# Patient Record
Sex: MALE | Race: WHITE | NOT HISPANIC OR LATINO | Employment: FULL TIME | URBAN - METROPOLITAN AREA
[De-identification: names, ages, dates, MRNs, and addresses within clinical notes are randomized per-mention and may not be internally consistent; named-entity substitution may affect disease eponyms.]

---

## 2017-02-17 ENCOUNTER — OFFICE VISIT (OUTPATIENT)
Dept: FAMILY MEDICINE | Facility: CLINIC | Age: 42
End: 2017-02-17
Payer: COMMERCIAL

## 2017-02-17 VITALS
WEIGHT: 177.8 LBS | HEIGHT: 68 IN | BODY MASS INDEX: 26.95 KG/M2 | SYSTOLIC BLOOD PRESSURE: 128 MMHG | DIASTOLIC BLOOD PRESSURE: 83 MMHG | HEART RATE: 94 BPM

## 2017-02-17 DIAGNOSIS — J45.30 MILD PERSISTENT ASTHMA WITHOUT COMPLICATION: ICD-10-CM

## 2017-02-17 DIAGNOSIS — G89.29 CHRONIC BILATERAL LOW BACK PAIN WITHOUT SCIATICA: ICD-10-CM

## 2017-02-17 DIAGNOSIS — J30.2 SEASONAL ALLERGIC RHINITIS, UNSPECIFIED ALLERGIC RHINITIS TRIGGER: ICD-10-CM

## 2017-02-17 DIAGNOSIS — R41.3 MEMORY LOSS: ICD-10-CM

## 2017-02-17 DIAGNOSIS — M54.50 CHRONIC BILATERAL LOW BACK PAIN WITHOUT SCIATICA: ICD-10-CM

## 2017-02-17 DIAGNOSIS — K21.9 GASTROESOPHAGEAL REFLUX DISEASE, ESOPHAGITIS PRESENCE NOT SPECIFIED: Primary | ICD-10-CM

## 2017-02-17 PROCEDURE — 99203 OFFICE O/P NEW LOW 30 MIN: CPT | Performed by: INTERNAL MEDICINE

## 2017-02-17 RX ORDER — LORATADINE 10 MG/1
10 TABLET ORAL DAILY PRN
Qty: 30 TABLET | Refills: 5 | Status: SHIPPED | OUTPATIENT
Start: 2017-02-17 | End: 2020-12-14

## 2017-02-17 RX ORDER — FAMOTIDINE 20 MG/1
20 TABLET, FILM COATED ORAL 2 TIMES DAILY
Qty: 180 TABLET | Refills: 3 | Status: CANCELLED | OUTPATIENT
Start: 2017-02-17

## 2017-02-17 RX ORDER — FLUTICASONE PROPIONATE 110 UG/1
2 AEROSOL, METERED RESPIRATORY (INHALATION) 2 TIMES DAILY
Qty: 3 INHALER | Refills: 3 | Status: SHIPPED | OUTPATIENT
Start: 2017-02-17 | End: 2018-10-30

## 2017-02-17 RX ORDER — ALBUTEROL SULFATE 90 UG/1
2 AEROSOL, METERED RESPIRATORY (INHALATION) EVERY 6 HOURS
Qty: 1 INHALER | Refills: 4 | Status: SHIPPED | OUTPATIENT
Start: 2017-02-17 | End: 2020-12-14

## 2017-02-17 RX ORDER — CYCLOBENZAPRINE HCL 10 MG
10 TABLET ORAL 3 TIMES DAILY PRN
Qty: 40 TABLET | Refills: 3 | Status: SHIPPED | OUTPATIENT
Start: 2017-02-17 | End: 2019-11-06

## 2017-02-17 RX ORDER — OMEPRAZOLE 10 MG/1
CAPSULE, DELAYED RELEASE ORAL
Qty: 180 CAPSULE | Refills: 3 | Status: SHIPPED | OUTPATIENT
Start: 2017-02-17 | End: 2017-07-25

## 2017-02-17 NOTE — PROGRESS NOTES
SUBJECTIVE:                                                    Daniele Carter is a 41 year old male who presents to clinic today for the following health issues:      Medication Followup of omeprazole    Taking Medication as prescribed: yes    Side Effects:  None    Medication Helping Symptoms:  yes     He was started on omeprazole about a year ago by an outside provided because Pepcid was not effective.  Takes 20mg daily and symptoms are controlled mostly.  He does occasionally have intermittent LUQ pain for a few months, none currently.  No fevers, chills, nausea.  Occasionally has red blood in stool, no dark tarry stools.  Drinks a lot of coffee.    Memory concerns-  His wife would like him to ask about his memory.  He related a long history of bad memory that seems to be getting worse.  He notes difficulty with concentration.  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      Problem list and histories reviewed & adjusted, as indicated.  Additional history: as documented    Current Outpatient Prescriptions   Medication Sig Dispense Refill     loratadine (CLARITIN) 10 MG tablet Take 1 tablet (10 mg) by mouth daily as needed for allergies 30 tablet 5     fluticasone (FLOVENT HFA) 110 MCG/ACT Inhaler Inhale 2 puffs into the lungs 2 times daily This is your controller inhaler and use daily. 3 Inhaler 3     cyclobenzaprine (FLEXERIL) 10 MG tablet Take 1 tablet (10 mg) by mouth 3 times daily as needed for muscle spasms 40 tablet 3     albuterol (PROAIR HFA/PROVENTIL HFA/VENTOLIN HFA) 108 (90 BASE) MCG/ACT Inhaler Inhale 2 puffs into the lungs every 6 hours This is your rescue inhaler. 1 Inhaler 4     omeprazole (PRILOSEC) 10 MG CR capsule Take 2 pills by mouth 30-60 minutes before a meal.  Taper down to 1 pill per day as tolerated 180 capsule 3     EPINEPHrine (EPIPEN 2-RONAN) 0.3 MG/0.3ML injection Inject 0.3 mLs (0.3 mg) into the muscle once as needed for anaphylaxis Hold on file until needed 2 each 3      "[DISCONTINUED] fluticasone (FLOVENT HFA) 110 MCG/ACT inhaler Take 2 puffs by mouth 2 times daily This is your controller inhaler and use daily. 3 Inhaler 1     [DISCONTINUED] albuterol (PROVENTIL HFA: VENTOLIN HFA) 108 (90 BASE) MCG/ACT inhaler Inhale 2 puffs into the lungs every 6 hours. This is your rescue inhaler. 1 Inhaler 4     Allergies   Allergen Reactions     Asa [Aspirin]      Unknown reaction- Hives???/     Bee Anaphylaxis       ROS:  Constitutional and GI systems are negative, except as otherwise noted.    OBJECTIVE:                                                    /83  Pulse 94  Ht 5' 8\" (1.727 m)  Wt 177 lb 12.8 oz (80.6 kg)  BMI 27.03 kg/m2  Body mass index is 27.03 kg/(m^2).  GENERAL: healthy, alert and no distress  NEURO: alert and oriented, but concentration is poor.  SLUMS exam was performed in clinic and he scored a 19    Diagnostic Test Results:  none      ASSESSMENT/PLAN:                                                        1. Gastroesophageal reflux disease, esophagitis presence not specified    He is currently taking 20mg daily of omeprazole for the past year.  Sx were not controlled on famotidine.  Discussed long term side effects of PPIs and recommended trying to taper down to 10mg and then off if tolerated.  He is having some LUQ pain intermittently, so recommended EGD and he will consider this.    - omeprazole (PRILOSEC) 10 MG CR capsule; Take 2 pills by mouth 30-60 minutes before a meal.  Taper down to 1 pill per day as tolerated  Dispense: 180 capsule; Refill: 3    2. Memory loss    He is concerned about a family history of Alzheimer disease.  Reassured that this is very unlikely at his age.  We did a SLUMS memory screen in clinic and he did score low at 19, but this seemed to be related more to concentration problems rather than actual memory problems.  Poor concentration is likely due to fatigue since he works night shifts and just finished a shift.  Recommended trying to " improve his sleep and provided handout on sleep hygiene.    3. Mild persistent asthma without complication    Controlled, refills provided.    - fluticasone (FLOVENT HFA) 110 MCG/ACT Inhaler; Inhale 2 puffs into the lungs 2 times daily This is your controller inhaler and use daily.  Dispense: 3 Inhaler; Refill: 3  - albuterol (PROAIR HFA/PROVENTIL HFA/VENTOLIN HFA) 108 (90 BASE) MCG/ACT Inhaler; Inhale 2 puffs into the lungs every 6 hours This is your rescue inhaler.  Dispense: 1 Inhaler; Refill: 4    4. Chronic bilateral low back pain without sciatica     Refill provided    - cyclobenzaprine (FLEXERIL) 10 MG tablet; Take 1 tablet (10 mg) by mouth 3 times daily as needed for muscle spasms  Dispense: 40 tablet; Refill: 3    5. Seasonal allergic rhinitis, unspecified allergic rhinitis trigger    Refill provided    - loratadine (CLARITIN) 10 MG tablet; Take 1 tablet (10 mg) by mouth daily as needed for allergies  Dispense: 30 tablet; Refill: 5      Follow-up as needed.      Elmer Horn MD  [facility]

## 2017-02-17 NOTE — MR AVS SNAPSHOT
"              After Visit Summary   2/17/2017    Daniele Carter    MRN: [id number]           Patient Information     Date Of Birth          1975        Visit Information        Provider Department      2/17/2017 7:40 AM Elmer Horn MD [facility]        Today's Diagnoses     Gastroesophageal reflux disease, esophagitis presence not specified    -  1    Mild persistent asthma without complication        Chronic bilateral low back pain without sciatica        Seasonal allergic rhinitis, unspecified allergic rhinitis trigger          Care Instructions    You memory trouble appears to be due to trouble with concentration, likely due to lack of sleep.        Sleep Hygeine Information (FV, 1 page)    Let us know if you would like to schedule a scope of the stomach to check for ulcers that may be causing your stomach pain.           Follow-ups after your visit        Who to contact     If you have questions or need follow up information about today's clinic visit or your schedule please contact [facility] directly at [phone].  Normal or non-critical lab and imaging results will be communicated to you by MyChart, letter or phone within 4 business days after the clinic has received the results. If you do not hear from us within 7 days, please contact the clinic through [employer] or phone. If you have a critical or abnormal lab result, we will notify you by phone as soon as possible.  Submit refill requests through [employer] or call your pharmacy and they will forward the refill request to us. Please allow 3 business days for your refill to be completed.          Additional Information About Your Visit        MyChart Information     [employer] lets you send messages to your doctor, view your test results, renew your prescriptions, schedule appointments and more. To sign up, go to [email or web address] . Click on \"Log in\" on the left side of the screen, which will take you to the Welcome " "page. Then click on \"Sign up Now\" on the right side of the page.     You will be asked to enter the access code listed below, as well as some personal information. Please follow the directions to create your username and password.     Your access code is: [email or web address]  Expires: 2017  8:15 AM     Your access code will  in 90 days. If you need help or a new code, please call your [place] clinic or [phone].        Care EveryWhere ID     This is your Care EveryWhere ID. This could be used by other organizations to access your [place] medical records  [id number]        Your Vitals Were     Pulse Height BMI (Body Mass Index)             94 5' 8\" (1.727 m) 27.03 kg/m2          Blood Pressure from Last 3 Encounters:   17 128/83   13 143/75   13 119/83    Weight from Last 3 Encounters:   17 177 lb 12.8 oz (80.6 kg)   13 174 lb 12.8 oz (79.3 kg)   13 170 lb (77.1 kg)              Today, you had the following     No orders found for display         Today's Medication Changes          These changes are accurate as of: 17  8:15 AM.  If you have any questions, ask your nurse or doctor.               Start taking these medicines.        Dose/Directions    omeprazole 10 MG CR capsule   Commonly known as:  priLOSEC   Used for:  Gastroesophageal reflux disease, esophagitis presence not specified   Started by:  Elmer Horn MD        Take 2 pills by mouth 30-60 minutes before a meal.  Taper down to 1 pill per day as tolerated   Quantity:  180 capsule   Refills:  3         These medicines have changed or have updated prescriptions.        Dose/Directions    fluticasone 110 MCG/ACT Inhaler   Commonly known as:  FLOVENT HFA   This may have changed:  how to take this   Used for:  Mild persistent asthma without complication   Changed by:  Elmer Horn MD        Dose:  2 puff   Inhale 2 puffs into the lungs 2 times daily This is your controller inhaler and use daily. "   Quantity:  3 Inhaler   Refills:  3       loratadine 10 MG tablet   Commonly known as:  CLARITIN   This may have changed:    - when to take this  - reasons to take this   Used for:  Seasonal allergic rhinitis, unspecified allergic rhinitis trigger   Changed by:  Elmer Horn MD        Dose:  10 mg   Take 1 tablet (10 mg) by mouth daily as needed for allergies   Quantity:  30 tablet   Refills:  5            Where to get your medicines      These medications were sent to [facility] [address] [place] [address] [address] [facility]  [address] [facility] [address]     Phone:  [phone]     albuterol 108 (90 BASE) MCG/ACT Inhaler    cyclobenzaprine 10 MG tablet    fluticasone 110 MCG/ACT Inhaler    loratadine 10 MG tablet    omeprazole 10 MG CR capsule                Primary Care Provider Office Phone # Fax #    Shola Mccormick [phone]       [facility] [address] [facility] [address]        Thank you!     Thank you for choosing [facility]  for your care. Our goal is always to provide you with excellent care. Hearing back from our patients is one way we can continue to improve our services. Please take a few minutes to complete the written survey that you may receive in the mail after your visit with us. Thank you!             Your Updated Medication List - Protect others around you: Learn how to safely use, store and throw away your medicines at www.disposemymeds.org.          This list is accurate as of: 2/17/17  8:15 AM.  Always use your most recent med list.                   Brand Name Dispense Instructions for use    albuterol 108 (90 BASE) MCG/ACT Inhaler    PROAIR HFA/PROVENTIL HFA/VENTOLIN HFA    1 Inhaler    Inhale 2 puffs into the lungs every 6 hours This is your rescue inhaler.       cyclobenzaprine 10 MG tablet    FLEXERIL    40 tablet    Take 1 tablet (10 mg) by mouth 3 times daily as needed for muscle spasms       EPINEPHrine 0.3  MG/0.3ML injection    EPIPEN 2-RONAN    2 each    Inject 0.3 mLs (0.3 mg) into the muscle once as needed for anaphylaxis Hold on file until needed       famotidine 20 MG tablet    PEPCID    180 tablet    Take 1 tablet by mouth 2 times daily.       fluticasone 110 MCG/ACT Inhaler    FLOVENT HFA    3 Inhaler    Inhale 2 puffs into the lungs 2 times daily This is your controller inhaler and use daily.       loratadine 10 MG tablet    CLARITIN    30 tablet    Take 1 tablet (10 mg) by mouth daily as needed for allergies       omeprazole 10 MG CR capsule    priLOSEC    180 capsule    Take 2 pills by mouth 30-60 minutes before a meal.  Taper down to 1 pill per day as tolerated

## 2017-02-17 NOTE — PATIENT INSTRUCTIONS
You memory trouble appears to be due to trouble with concentration, likely due to lack of sleep.        Sleep Hygeine Information (FV, 1 page)    Let us know if you would like to schedule a scope of the stomach to check for ulcers that may be causing your stomach pain.

## 2017-03-16 ENCOUNTER — TELEPHONE (OUTPATIENT)
Dept: FAMILY MEDICINE | Facility: CLINIC | Age: 42
End: 2017-03-16

## 2017-03-16 DIAGNOSIS — R10.12 LUQ ABDOMINAL PAIN: Primary | ICD-10-CM

## 2017-03-16 NOTE — TELEPHONE ENCOUNTER
" Notified him, \"I will call them to set that up . I have to work around my wife's schedule. \"  Gave him the number to call to schedule gastroscopy.     Pamela Merino RNC    "

## 2017-03-16 NOTE — TELEPHONE ENCOUNTER
Reason for Call: Request for an order or referral:    Order or referral being requested: Upper GI     Date needed: as soon as possible    Has the patient been seen by the PCP for this problem? YES    Additional comments: pt states when seen he talked to MD about his left GI pain and was told to wait a month and call if still an issue   Pt is now wanting an upper GI done     Phone number Patient can be reached at:  [phone]    Best Time:  Any     Can we leave a detailed message on this number?  YES    Call taken on 3/16/2017 at 8:30 AM by Shavon Mccormick

## 2017-03-16 NOTE — TELEPHONE ENCOUNTER
"Patient was seen 2/17/17 by Dr Horn in clinic:     \"1. Gastroesophageal reflux disease, esophagitis presence not specified     He is currently taking 20mg daily of omeprazole for the past year. Sx were not controlled on famotidine. Discussed long term side effects of PPIs and recommended trying to taper down to 10mg and then off if tolerated. He is having some LUQ pain intermittently, so recommended EGD and he will consider this.\"    Dr Horn, do you want to order EGD now?   Or see him again?   Pamela Merino RNC    "

## 2017-03-20 ENCOUNTER — TELEPHONE (OUTPATIENT)
Dept: FAMILY MEDICINE | Facility: CLINIC | Age: 42
End: 2017-03-20

## 2017-03-20 NOTE — TELEPHONE ENCOUNTER
Patient is due for ACT.  ACT has not been completed since 2013.  Refills given at 2/17/17 office visit.  Letter with ACT mailed to patient.

## 2017-03-27 ENCOUNTER — ANESTHESIA EVENT (OUTPATIENT)
Dept: GASTROENTEROLOGY | Facility: CLINIC | Age: 42
End: 2017-03-27
Payer: COMMERCIAL

## 2017-03-27 NOTE — ANESTHESIA PREPROCEDURE EVALUATION
Anesthesia Evaluation     . Pt has had prior anesthetic. Type: MAC    No history of anesthetic complications          ROS/MED HX    ENT/Pulmonary:     (+)Mild Persistent asthma Treatment: Inhaler prn,  , . .    Neurologic:  - neg neurologic ROS     Cardiovascular:     (+) Dyslipidemia, ----. : . . . :. .       METS/Exercise Tolerance:  >4 METS   Hematologic:  - neg hematologic  ROS       Musculoskeletal:  - neg musculoskeletal ROS       GI/Hepatic:     (+) GERD Other GI/Hepatic LUQ abd pain      Renal/Genitourinary:  - ROS Renal section negative       Endo:  - neg endo ROS       Psychiatric:  - neg psychiatric ROS       Infectious Disease:  - neg infectious disease ROS       Malignancy:      - no malignancy   Other:    - neg other ROS                 Physical Exam  Normal systems: cardiovascular, pulmonary and dental    Airway   Mallampati: II  TM distance: >3 FB  Neck ROM: full    Dental     Cardiovascular       Pulmonary                     Anesthesia Plan      History & Physical Review  History and physical reviewed and following examination; no interval change.    ASA Status:  2 .    NPO Status:  > 8 hours    Plan for MAC and General with Propofol induction. Reason for MAC:  Deep or markedly invasive procedure (G8)  PONV prophylaxis:  Ondansetron (or other 5HT-3) and Dexamethasone or Solumedrol       Postoperative Care  Postoperative pain management:  IV analgesics and Oral pain medications.      Consents  Anesthetic plan, risks, benefits and alternatives discussed with:  Patient and Spouse..                          .

## 2017-03-27 NOTE — TELEPHONE ENCOUNTER
Talked with pt's wife, she will give him the message.  They did receive act in the mail. Postpone to see if pt mails back.   Erica Lara,CMA

## 2017-03-28 ENCOUNTER — ANESTHESIA (OUTPATIENT)
Dept: GASTROENTEROLOGY | Facility: CLINIC | Age: 42
End: 2017-03-28
Payer: COMMERCIAL

## 2017-03-28 ENCOUNTER — SURGERY (OUTPATIENT)
Age: 42
End: 2017-03-28

## 2017-03-28 ENCOUNTER — HOSPITAL ENCOUNTER (OUTPATIENT)
Facility: CLINIC | Age: 42
Discharge: HOME | End: 2017-03-28
Attending: SURGERY | Admitting: SURGERY
Payer: COMMERCIAL

## 2017-03-28 VITALS
HEART RATE: 79 BPM | DIASTOLIC BLOOD PRESSURE: 82 MMHG | RESPIRATION RATE: 16 BRPM | SYSTOLIC BLOOD PRESSURE: 115 MMHG | OXYGEN SATURATION: 99 % | HEIGHT: 69 IN | BODY MASS INDEX: 26.22 KG/M2 | TEMPERATURE: 97.7 F | WEIGHT: 177 LBS

## 2017-03-28 LAB — UPPER GI ENDOSCOPY: NORMAL

## 2017-03-28 PROCEDURE — 37000008 ZZH ANESTHESIA TECHNICAL FEE, 1ST 30 MIN: Performed by: SURGERY

## 2017-03-28 PROCEDURE — 25000125 ZZHC RX 250: Performed by: SURGERY

## 2017-03-28 PROCEDURE — 88305 TISSUE EXAM BY PATHOLOGIST: CPT | Performed by: SURGERY

## 2017-03-28 PROCEDURE — 88305 TISSUE EXAM BY PATHOLOGIST: CPT | Mod: 26 | Performed by: SURGERY

## 2017-03-28 PROCEDURE — 25000132 ZZH RX MED GY IP 250 OP 250 PS 637: Performed by: SURGERY

## 2017-03-28 PROCEDURE — 43239 EGD BIOPSY SINGLE/MULTIPLE: CPT | Performed by: SURGERY

## 2017-03-28 PROCEDURE — 25000125 ZZHC RX 250: Performed by: NURSE ANESTHETIST, CERTIFIED REGISTERED

## 2017-03-28 PROCEDURE — 25800025 ZZH RX 258: Performed by: SURGERY

## 2017-03-28 RX ORDER — LIDOCAINE HYDROCHLORIDE 10 MG/ML
INJECTION, SOLUTION INFILTRATION; PERINEURAL PRN
Status: DISCONTINUED | OUTPATIENT
Start: 2017-03-28 | End: 2017-03-28

## 2017-03-28 RX ORDER — SODIUM CHLORIDE, SODIUM LACTATE, POTASSIUM CHLORIDE, CALCIUM CHLORIDE 600; 310; 30; 20 MG/100ML; MG/100ML; MG/100ML; MG/100ML
INJECTION, SOLUTION INTRAVENOUS CONTINUOUS
Status: DISCONTINUED | OUTPATIENT
Start: 2017-03-28 | End: 2017-03-28 | Stop reason: HOSPADM

## 2017-03-28 RX ORDER — PROPOFOL 10 MG/ML
INJECTION, EMULSION INTRAVENOUS CONTINUOUS PRN
Status: DISCONTINUED | OUTPATIENT
Start: 2017-03-28 | End: 2017-03-28

## 2017-03-28 RX ORDER — PROPOFOL 10 MG/ML
INJECTION, EMULSION INTRAVENOUS PRN
Status: DISCONTINUED | OUTPATIENT
Start: 2017-03-28 | End: 2017-03-28

## 2017-03-28 RX ORDER — ONDANSETRON 2 MG/ML
4 INJECTION INTRAMUSCULAR; INTRAVENOUS
Status: DISCONTINUED | OUTPATIENT
Start: 2017-03-28 | End: 2017-03-28 | Stop reason: HOSPADM

## 2017-03-28 RX ORDER — LIDOCAINE 40 MG/G
CREAM TOPICAL
Status: DISCONTINUED | OUTPATIENT
Start: 2017-03-28 | End: 2017-03-28 | Stop reason: HOSPADM

## 2017-03-28 RX ADMIN — PROPOFOL 200 MCG/KG/MIN: 10 INJECTION, EMULSION INTRAVENOUS at 11:27

## 2017-03-28 RX ADMIN — LIDOCAINE HYDROCHLORIDE 50 MG: 10 INJECTION, SOLUTION INFILTRATION; PERINEURAL at 11:27

## 2017-03-28 RX ADMIN — PROPOFOL 100 MG: 10 INJECTION, EMULSION INTRAVENOUS at 11:34

## 2017-03-28 RX ADMIN — BENZOCAINE 1 SPRAY: 220 SPRAY, METERED PERIODONTAL at 11:22

## 2017-03-28 RX ADMIN — LIDOCAINE HYDROCHLORIDE 1 ML: 10 INJECTION, SOLUTION INFILTRATION; PERINEURAL at 11:03

## 2017-03-28 RX ADMIN — SODIUM CHLORIDE, POTASSIUM CHLORIDE, SODIUM LACTATE AND CALCIUM CHLORIDE: 600; 310; 30; 20 INJECTION, SOLUTION INTRAVENOUS at 11:03

## 2017-03-28 RX ADMIN — PROPOFOL 50 MG: 10 INJECTION, EMULSION INTRAVENOUS at 11:38

## 2017-03-28 RX ADMIN — PROPOFOL 100 MG: 10 INJECTION, EMULSION INTRAVENOUS at 11:27

## 2017-03-28 NOTE — BRIEF OP NOTE
[facility]   Brief Operative Note    Pre-operative diagnosis: LUQ abd pain   Post-operative diagnosis multiple gastric polyps, otherwise normal    Procedure: Procedure(s):  Gastroscopy   - Wound Class: II-Clean Contaminated   Surgeon(s): Surgeon(s) and Role:     * Nikolai Valladares MD - Primary   Estimated blood loss: * No values recorded between 3/28/2017 12:00 AM and 3/28/2017 11:38 AM *    Specimens:   ID Type Source Tests Collected by Time Destination   A : h. pylori and polyp Polyp Stomach, Antrum SURGICAL PATHOLOGY EXAM Nikolai Valladares MD 3/28/2017 11:32 AM       Findings: 1.  Esophagus normal, z-line regular  2.  Multiple benign gastric polyps (fundus and body), one biopsied  3.  Stomach otherwise normal.  4.  Duodenum normal

## 2017-03-28 NOTE — H&P
41 year old year old male here for upper endoscopy for left upper quadrant pain.        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       Allergies   Allergen Reactions     Asa [Aspirin]      Unknown reaction- Hives???/     Bee Anaphylaxis     Chocolate Flavor Hives       Pt reports that he has never smoked. He has never used smokeless tobacco. He reports that he drinks alcohol. He reports that he does not use illicit drugs.    Exam:    Awake, Alert OX3  Lungs - CTA bilaterally  CV - RRR, no murmurs, distal pulses intact  Abd - soft, non-distended, non-tender, +BS  Extr - No cyanosis or edema    A/P 41 year old year old male in need of upper endoscopy for left upper quadrant pain.  Risks, benefits, alternatives, and complications were discussed including the possibility of perforation and the patient agreed to proceed.    Nikolai Valladares MD

## 2017-03-28 NOTE — ANESTHESIA CARE TRANSFER NOTE
Patient: Daniele Carter    Procedure(s):  Gastroscopy   - Wound Class: II-Clean Contaminated    Diagnosis: LUQ abd pain  Diagnosis Additional Information: No value filed.    Anesthesia Type:   No value filed.     Note:  Airway :Nasal Cannula  Patient transferred to:Phase II        Vitals: (Last set prior to Anesthesia Care Transfer)    CRNA VITALS  3/28/2017 1112 - 3/28/2017 1142      3/28/2017             Pulse: 85    SpO2: 96 %                Electronically Signed By: ISSA Eddy CRNA  March 28, 2017  11:42 AM

## 2017-03-30 LAB — COPATH REPORT: NORMAL

## 2017-04-03 NOTE — TELEPHONE ENCOUNTER
Panel Management Review      Patient has the following on his problem list:     Asthma review     ACT Total Scores 4/3/2017   ACT TOTAL SCORE -   ASTHMA ER VISITS -   ASTHMA HOSPITALIZATIONS -   ACT TOTAL SCORE (Goal Greater than or Equal to 20) 21   In the past 12 months, how many times did you visit the emergency room for your asthma without being admitted to the hospital? 0   In the past 12 months, how many times were you hospitalized overnight because of your asthma? 0      1. Is Asthma diagnosis on the Problem List? Yes    2. Is Asthma listed on Health Maintenance? Yes    3. Patient is due for:  ACT and AAP      Composite cancer screening  Chart review shows that this patient is due/due soon for the following None  Summary:    Patient is due/failing the following:   ACT    Action needed:   Patient needs to do ACT.    Type of outreach:    ACT completed over the phone with a score of 21.  Patient does not have any questions or concerns at this time.    Questions for provider review:    None                                                                                                                                    ARI Sharif, CMA

## 2017-04-04 ASSESSMENT — ASTHMA QUESTIONNAIRES: ACT_TOTALSCORE: 21

## 2017-06-07 ENCOUNTER — TELEPHONE (OUTPATIENT)
Dept: FAMILY MEDICINE | Facility: CLINIC | Age: 42
End: 2017-06-07

## 2017-06-07 NOTE — TELEPHONE ENCOUNTER
"omeprazole (PRILOSEC) 10 MG CR capsule 180 capsule 3 2/17/2017  --   Sig: Take 2 pills by mouth 30-60 minutes before a meal.  Taper down to 1 pill per day as tolerated   Class: E-Prescribe   Order: [id number]   E-Prescribing Status: Receipt confirmed by pharmacy (2/17/2017  8:14 AM CST)     Called and left him a detailed message per his ok to do so below that says \"you have a prescription for 20 mg a day at [facility], and can call them and refer to Dr Horn's order 2/17/17. Left our number to call us back if any further question.   Pamela Merino RNC    "

## 2017-06-07 NOTE — TELEPHONE ENCOUNTER
"Reason for Call:  Other medication increase    Detailed comments: pt calling wondering if he can increase his omeprazole to 20 mg instead of 10 mg. He stated that \"10 mg just doesn't seem to be helping\".  He will need a rx sent in and would appreciate if we can call him and let him know it's been sent.    Phone Number Patient can be reached at: Home number on file [phone] (home)    Best Time: any    Can we leave a detailed message on this number? YES    Call taken on 6/7/2017 at 2:12 PM by Isela Aevry      "

## 2017-07-25 ENCOUNTER — OFFICE VISIT (OUTPATIENT)
Dept: FAMILY MEDICINE | Facility: CLINIC | Age: 42
End: 2017-07-25
Payer: COMMERCIAL

## 2017-07-25 VITALS
WEIGHT: 180 LBS | HEIGHT: 69 IN | SYSTOLIC BLOOD PRESSURE: 120 MMHG | TEMPERATURE: 99.1 F | DIASTOLIC BLOOD PRESSURE: 74 MMHG | BODY MASS INDEX: 26.66 KG/M2 | HEART RATE: 92 BPM

## 2017-07-25 DIAGNOSIS — F51.01 PRIMARY INSOMNIA: ICD-10-CM

## 2017-07-25 DIAGNOSIS — K21.9 GASTROESOPHAGEAL REFLUX DISEASE, ESOPHAGITIS PRESENCE NOT SPECIFIED: Primary | ICD-10-CM

## 2017-07-25 DIAGNOSIS — Z23 NEED FOR DIPHTHERIA-TETANUS-PERTUSSIS (TDAP) VACCINE: ICD-10-CM

## 2017-07-25 DIAGNOSIS — S83.411A SPRAIN OF MEDIAL COLLATERAL LIGAMENT OF RIGHT KNEE, INITIAL ENCOUNTER: ICD-10-CM

## 2017-07-25 PROCEDURE — 90471 IMMUNIZATION ADMIN: CPT | Performed by: INTERNAL MEDICINE

## 2017-07-25 PROCEDURE — 99214 OFFICE O/P EST MOD 30 MIN: CPT | Mod: 25 | Performed by: INTERNAL MEDICINE

## 2017-07-25 PROCEDURE — 90715 TDAP VACCINE 7 YRS/> IM: CPT | Performed by: INTERNAL MEDICINE

## 2017-07-25 NOTE — LETTER
[facility]  [address] [facility] [address]  Phone: [phone]    July 25, 2017        Daniele Carter  [address] [place] [id number]          To whom it may concern:    RE: Daniele Carter    Patient may return to work 7/25/17 with the following restriction: should avoid walking and climbing stairs as much as possible for the next week.      Please contact me for questions or concerns.      Sincerely,        Elmer Horn MD

## 2017-07-25 NOTE — MR AVS SNAPSHOT
After Visit Summary   7/25/2017    Daniele Carter    MRN: [id number]           Patient Information     Date Of Birth          1975        Visit Information        Provider Department      7/25/2017 8:40 AM Elmer Horn MD [facility]        Today's Diagnoses     Need for diphtheria-tetanus-pertussis (Tdap) vaccine    -  1    Gastroesophageal reflux disease, esophagitis presence not specified          Care Instructions    I'd recommend first trying melatonin to help with sleep, either 1mg or 3mg dose.  Try this for about a month or so, and if it's not helping, we can next try a prescription sleep medication.  Unfortunately, no medication is great at helping with sleep long term, so continue to work on sleep hygiene methods.      If knee pain hasn't improved in a few more weeks, please call and we may need to proceed with getting an MRI for further evaluation.  Try to stay off your feet as much as possible for the next week or so.  If work is not able to make accommodations and you need FMLA paperwork filled out for time off, please send us those forms.        Understanding Medial Collateral Ligament Sprain    The knee is a complex joint where the thighbone (femur) meets the shinbone (tibia). Strong tissues called ligaments connect these bones together. Ligaments also keep the bones aligned, so the knee only bends how it is supposed to. The medial collateral ligament (MCL) runs across the knee joint on the medial side of the leg. Injury to this ligament may be very painful. The knee may also not work the way it should.  Causes of an MCL sprain  An MCL sprain often happens when the knee joint is pushed beyond its normal range of motion. It is most common during a blow to the knee from the outside, pushing the knee inward. It may also happen if the knee is forced into a twist. These movements stretch and tear the MCL. Other parts of the knee may be damaged along with the  MCL.  Symptoms of an MCL sprain  These include:    Knee pain    Knee swelling    Locking of the joint    Wobbly or unstable feeling in the joint  Treatment for an MCL sprain  Treatment will depend on the severity of the sprain and whether there is damage to other parts of the knee. Options often include:    Rest. This allows the knee to heal. Activities that stress the knee should be avoided. Crutches, a knee brace, or both may also be recommended for a short time.    Cold packs and elevation of the knee. These help reduce swelling and relieve pain.    Compression. The knee may be wrapped with a bandage to help reduce swelling.    Medicines. These help relieve pain and swelling.    Exercises. These help improve the knee s stability, strength, and range of motion.  If the injury is severe or several parts of the joint are involved, surgery may be an option. Surgery repairs the MCL and any other damaged structures.     When to call your healthcare provider  Call your healthcare provider right away if you have any of these:    Pain, swelling, or instability that doesn t get better with treatment or gets worse    New symptoms   Date Last Reviewed: 3/10/2016    8962-6921 [employer]. [address] [place] [address]. All rights reserved. This information is not intended as a substitute for professional medical care. Always follow your healthcare professional's instructions.        Knee Pain with Possible Torn Meniscus    The meniscus is a tough cartilage pad that cushions the inside of the knee joint. It helps absorb the shock from movement. It also spreads the weight of your body evenly across the knee joint. This prevents excess wear and tear to the bones of that joint.  The most common causes of meniscal tears are injuries, especially related to sports and degenerative disease that happens with aging.  A meniscus tear commonly happens during a twisting injury when the knee is bent. This causes  pain, swelling, reduced movement of the knee, and trouble walking. There may be popping, clicking, joint locking or inability to completely straighten the knee. Ligaments of the knee may also be injured.  A torn meniscus is diagnosed by physical exam and X-rays. In the case of a severe injury, the knee may be too painful to examine fully. A more accurate exam can be done after the initial swelling goes down. An MRI may be ordered to make a final diagnosis.  If your healthcare provider suspects a meniscal injury, you will treat your knee with ice and rest and preventing movement of the knee. A splint or knee brace that keeps your leg straight may be put on to protect the joint. Depending on the severity of the injury, surgery may be needed. A cartilage injury may take 4-12 weeks to heal depending on how bad it is.  Home care    Stay off the injured leg as much as possible until you can walk on it without pain. If you have a lot of pain when walking, crutches or a walker may be prescribed. (These can be rented or bought at many pharmacies and surgical or orthopedic supply stores). Follow your healthcare provider's advice about when to begin putting weight on that leg.    Keep your leg elevated to reduce pain and swelling. When sleeping, place a pillow under the injured leg. When sitting, support the injured leg so it is level with your waist. This is very important during the first 48 hours.    Apply an ice pack over the injured area for 15 to 20 minutes every 3 to 6 hours. You should do this for the first 24 to 48 hours. You can make an ice pack by filling a plastic bag that seals at the top with ice cubes and then wrapping it with a thin towel. Continue to use ice packs for relief of pain and swelling as needed. As the ice melts, be careful to avoid getting your wrap, splint, or cast wet. After 48 hours, apply heat (warm shower or warm bath) for 15 to 20 minutes several times a day, or alternate ice and heat. You  can place the ice pack directly over the splint. If you have to wear a hook-and-loop knee brace, you can open it to apply the ice pack, or heat, directly to the knee. Never put ice directly on the skin. Always wrap the ice in a towel or other type of cloth.    You may use over-the-counter pain medicine to control pain, unless another pain medicine was prescribed. If you have chronic liver or kidney disease or ever had a stomach ulcer, talk with your healthcare provider before using these medicines.    If you were given a splint, keep it dry at all times. Bathe with your splint out of the water. Protect it with a large plastic bag that is rubber-banded at the top end. If a fiberglass splint gets wet, you can dry it with a hair dryer. If you have a hook-and-loop knee brace, you can remove this to bathe, unless told otherwise.    Check with your healthcare provider before returning to sports or full work duties.  Follow-up care  Follow up with your healthcare provider, or as advised. This is usually within 1-2 weeks. Further testing may be required to check the extent of your injury.  If X-rays were taken, you will be told of any new findings that may affect your care.  Call 911  Call 911 if you have:     Shortness of breath    Chest pain  When to seek medical advice  Call your healthcare provider right away if any of these occur:    Toes or foot gets swollen, cold, blue, numb, or tingly    Pain or swelling spreads over the knee or calf    Warmth or redness appears over the knee or calf    Fever of 100.4 F (38 C) or above lasting for 24 to 48 hours  Date Last Reviewed: 11/23/2015 2000-2017 [employer]. [address] [place] [address], [place] [address]. All rights reserved. This information is not intended as a substitute for professional medical care. Always follow your healthcare professional's instructions.                Follow-ups after your visit        Who to contact     If you have questions or need  "follow up information about today's clinic visit or your schedule please contact [facility] directly at [phone].  Normal or non-critical lab and imaging results will be communicated to you by MyChart, letter or phone within 4 business days after the clinic has received the results. If you do not hear from us within 7 days, please contact the clinic through [employer] or phone. If you have a critical or abnormal lab result, we will notify you by phone as soon as possible.  Submit refill requests through [employer] or call your pharmacy and they will forward the refill request to us. Please allow 3 business days for your refill to be completed.          Additional Information About Your Visit        [employer] Information     [employer] lets you send messages to your doctor, view your test results, renew your prescriptions, schedule appointments and more. To sign up, go to [email or web address] . Click on \"Log in\" on the left side of the screen, which will take you to the Welcome page. Then click on \"Sign up Now\" on the right side of the page.     You will be asked to enter the access code listed below, as well as some personal information. Please follow the directions to create your username and password.     Your access code is: [email or web address]  Expires: 10/23/2017  9:17 AM     Your access code will  in 90 days. If you need help or a new code, please call your [facility] clinic or [phone].        Care EveryWhere ID     This is your Care EveryWhere ID. This could be used by other organizations to access your [facility] medical records  [id number]        Your Vitals Were     Pulse Temperature Height BMI (Body Mass Index)          92 99.1  F (37.3  C) (Tympanic) 5' 9\" (1.753 m) 26.58 kg/m2         Blood Pressure from Last 3 Encounters:   17 120/74   17 115/82   17 128/83    Weight from Last 3 Encounters:   17 180 lb (81.6 kg)   17 177 lb (80.3 kg)   17 177 lb 12.8 oz " (80.6 kg)              We Performed the Following     ADMIN 1st VACCINE     SCREENING QUESTIONS FOR ADULT IMMUNIZATIONS     TDAP VACCINE (ADACEL)          Today's Medication Changes          These changes are accurate as of: 7/25/17  9:17 AM.  If you have any questions, ask your nurse or doctor.               These medicines have changed or have updated prescriptions.        Dose/Directions    omeprazole 20 MG CR capsule   Commonly known as:  priLOSEC   This may have changed:    - medication strength  - how much to take  - how to take this  - when to take this  - additional instructions   Used for:  Gastroesophageal reflux disease, esophagitis presence not specified   Changed by:  Elmer Horn MD        Dose:  20 mg   Take 1 capsule (20 mg) by mouth daily   Quantity:  90 capsule   Refills:  3            Where to get your medicines      These medications were sent to [facility] [address] [place] [address] [address] [facility]  [address] [facility] [address]     Phone:  [phone]     omeprazole 20 MG CR capsule                Primary Care Provider Office Phone # Fax #    Shola Mccormick [phone]       [facility] [address] [facility] [address]        Equal Access to Services     MARIE ETE AH: Hadii alton ku hadasho Soomaali, waaxda luqadaha, qaybta kaalmada adeegyada, ryan romo haydaniel price . So [facility] [phone].    ATENCIÓN: Si habla español, tiene a hdz disposición servicios gratuitos de asistencia lingüística. Imelda al [phone].    We comply with applicable federal civil rights laws and [place] laws. We do not discriminate on the basis of race, color, national origin, age, disability sex, sexual orientation or gender identity.            Thank you!     Thank you for choosing [facility]  for your care. Our goal is always to provide you with excellent care. Hearing back from our patients is one way we can continue to  improve our services. Please take a few minutes to complete the written survey that you may receive in the mail after your visit with us. Thank you!             Your Updated Medication List - Protect others around you: Learn how to safely use, store and throw away your medicines at www.disposemymeds.org.          This list is accurate as of: 7/25/17  9:17 AM.  Always use your most recent med list.                   Brand Name Dispense Instructions for use Diagnosis    albuterol 108 (90 BASE) MCG/ACT Inhaler    PROAIR HFA/PROVENTIL HFA/VENTOLIN HFA    1 Inhaler    Inhale 2 puffs into the lungs every 6 hours This is your rescue inhaler.    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    Mild persistent asthma without complication       loratadine 10 MG tablet    CLARITIN    30 tablet    Take 1 tablet (10 mg) by mouth daily as needed for allergies    Seasonal allergic rhinitis, unspecified allergic rhinitis trigger       omeprazole 20 MG CR capsule    priLOSEC    90 capsule    Take 1 capsule (20 mg) by mouth daily    Gastroesophageal reflux disease, esophagitis presence not specified

## 2017-07-25 NOTE — PROGRESS NOTES
SUBJECTIVE:                                                    Daniele Carter is a 41 year old male who presents to clinic today for the following health issues:  Chief Complaint   Patient presents with     Gastrophageal Reflux     dosage change     Sleep Problem     x 6 months, not able to sleep,      Knee Pain     x 1 week, right knee pain, injury doing lawn work      Imm/Inj     Tdap     Medication Followup of Omeprazole     Taking Medication as prescribed: NO-taking more than prescribed     Side Effects:  None    Medication Helping Symptoms:  Yes, when taking the higher dosage.      We attempted to taper down to 10mg of omeprazole, but he had return of reflux on this dose, so he increased back to 20mg with improvement.      Sleep       Duration: x 6 months     Description (location/character/radiation): Patient reports he can fall asleep but cannot stay asleep, works overnights.      Intensity:  mild        Precipitating or alleviating factors: works overnights.     Therapies tried and outcome: None     Tried to improve sleep hygiene with paying attention to diet and exercise, and this helped some, but he is still struggling.  Has not tried any OTCs.      Knee injury       Duration: x 1 week    Description (location/character/radiation): right knee injury while working outside, may have stepped in a hole and twisted his knee    Intensity:  moderate, severe    Accompanying signs and symptoms: feels like something is moving inside     History (similar episodes/previous evaluation): has had some trouble w/ right knee in the past, about 6 years ago.      Precipitating or alleviating factors: knee brace    Therapies tried and outcome: icing seems to help, ibuprofen, flexeril helps       Frank has had ongoing constant knee pain medially in the right knee for a week since twisting his knee with occasional sharp pains.  Feels his knees is popping and catching.  Had a lot of swelling initially, this had improved.   "    Problem list and histories reviewed & adjusted, as indicated.  Additional history: as documented    Current Outpatient Prescriptions   Medication Sig Dispense Refill     omeprazole (PRILOSEC) 20 MG CR capsule Take 1 capsule (20 mg) by mouth daily 90 capsule 3     loratadine (CLARITIN) 10 MG tablet Take 1 tablet (10 mg) by mouth daily as needed for allergies 30 tablet 5     cyclobenzaprine (FLEXERIL) 10 MG tablet Take 1 tablet (10 mg) by mouth 3 times daily as needed for muscle spasms 40 tablet 3     fluticasone (FLOVENT HFA) 110 MCG/ACT Inhaler Inhale 2 puffs into the lungs 2 times daily This is your controller inhaler and use daily. 3 Inhaler 3     albuterol (PROAIR HFA/PROVENTIL HFA/VENTOLIN HFA) 108 (90 BASE) MCG/ACT Inhaler Inhale 2 puffs into the lungs every 6 hours This is your rescue inhaler. 1 Inhaler 4     EPINEPHrine (EPIPEN 2-RONAN) 0.3 MG/0.3ML injection Inject 0.3 mLs (0.3 mg) into the muscle once as needed for anaphylaxis Hold on file until needed 2 each 3     Allergies   Allergen Reactions     Asa [Aspirin]      Unknown reaction- Hives???/     Bee Anaphylaxis     Chocolate Flavor Hives       Reviewed and updated as needed this visit by clinical staffTobacco  Allergies  Med Hx  Surg Hx  Fam Hx  Soc Hx      Reviewed and updated as needed this visit by Provider         ROS:  Constitutional, MSK systems are negative, except as otherwise noted.      OBJECTIVE:   /74 (BP Location: Right arm, Patient Position: Chair, Cuff Size: Adult Regular)  Pulse 92  Temp 99.1  F (37.3  C) (Tympanic)  Ht 5' 9\" (1.753 m)  Wt 180 lb (81.6 kg)  BMI 26.58 kg/m2  Body mass index is 26.58 kg/(m^2).  GENERAL: healthy, alert and no distress  MS: tender to palpation of medial right knee with pain elicited on valgus stress test, no joint laxity or significant edema      ASSESSMENT/PLAN:       1. Gastroesophageal reflux disease, esophagitis presence not specified    Not controlled on 10mg of omeprazole, so will " plan to stick with 20mg.     - omeprazole (PRILOSEC) 20 MG CR capsule; Take 1 capsule (20 mg) by mouth daily  Dispense: 90 capsule; Refill: 3    2. Sprain of medial collateral ligament of right knee, initial encounter    Knee pain likely due to MCL sprain and possible meniscal tear.  Will continue conservative therapy and have him remain off work for the next week since he is not able to do modified activities while at work.  Continue knee brace.  If still not improving after a month or so, further evaluate with MRI.     3. Primary insomnia    Related to shift work.  Some improvement with sleep hygiene methods, but still having problems so will first try some melatonin.  If not improving, consider short term treatment with prescription sleep medication.     4. Need for diphtheria-tetanus-pertussis (Tdap) vaccine    - TDAP VACCINE (ADACEL)  - SCREENING QUESTIONS FOR ADULT IMMUNIZATIONS  - ADMIN 1st VACCINE      Elmer Horn MD  [facility]

## 2017-07-25 NOTE — PATIENT INSTRUCTIONS
I'd recommend first trying melatonin to help with sleep, either 1mg or 3mg dose.  Try this for about a month or so, and if it's not helping, we can next try a prescription sleep medication.  Unfortunately, no medication is great at helping with sleep long term, so continue to work on sleep hygiene methods.      If knee pain hasn't improved in a few more weeks, please call and we may need to proceed with getting an MRI for further evaluation.  Try to stay off your feet as much as possible for the next week or so.  If work is not able to make accommodations and you need [facility] paperwork filled out for time off, please send us those forms.        Understanding Medial Collateral Ligament Sprain    The knee is a complex joint where the thighbone (femur) meets the shinbone (tibia). Strong tissues called ligaments connect these bones together. Ligaments also keep the bones aligned, so the knee only bends how it is supposed to. The medial collateral ligament (MCL) runs across the knee joint on the medial side of the leg. Injury to this ligament may be very painful. The knee may also not work the way it should.  Causes of an MCL sprain  An MCL sprain often happens when the knee joint is pushed beyond its normal range of motion. It is most common during a blow to the knee from the outside, pushing the knee inward. It may also happen if the knee is forced into a twist. These movements stretch and tear the MCL. Other parts of the knee may be damaged along with the MCL.  Symptoms of an MCL sprain  These include:    Knee pain    Knee swelling    Locking of the joint    Wobbly or unstable feeling in the joint  Treatment for an MCL sprain  Treatment will depend on the severity of the sprain and whether there is damage to other parts of the knee. Options often include:    Rest. This allows the knee to heal. Activities that stress the knee should be avoided. Crutches, a knee brace, or both may also be recommended for a short  time.    Cold packs and elevation of the knee. These help reduce swelling and relieve pain.    Compression. The knee may be wrapped with a bandage to help reduce swelling.    Medicines. These help relieve pain and swelling.    Exercises. These help improve the knee s stability, strength, and range of motion.  If the injury is severe or several parts of the joint are involved, surgery may be an option. Surgery repairs the MCL and any other damaged structures.     When to call your healthcare provider  Call your healthcare provider right away if you have any of these:    Pain, swelling, or instability that doesn t get better with treatment or gets worse    New symptoms   Date Last Reviewed: 3/10/2016    6164-4112 [employer]. [address] [place] [address], [place] [address]. All rights reserved. This information is not intended as a substitute for professional medical care. Always follow your healthcare professional's instructions.        Knee Pain with Possible Torn Meniscus    The meniscus is a tough cartilage pad that cushions the inside of the knee joint. It helps absorb the shock from movement. It also spreads the weight of your body evenly across the knee joint. This prevents excess wear and tear to the bones of that joint.  The most common causes of meniscal tears are injuries, especially related to sports and degenerative disease that happens with aging.  A meniscus tear commonly happens during a twisting injury when the knee is bent. This causes pain, swelling, reduced movement of the knee, and trouble walking. There may be popping, clicking, joint locking or inability to completely straighten the knee. Ligaments of the knee may also be injured.  A torn meniscus is diagnosed by physical exam and X-rays. In the case of a severe injury, the knee may be too painful to examine fully. A more accurate exam can be done after the initial swelling goes down. An MRI may be ordered to make a final diagnosis.  If  your healthcare provider suspects a meniscal injury, you will treat your knee with ice and rest and preventing movement of the knee. A splint or knee brace that keeps your leg straight may be put on to protect the joint. Depending on the severity of the injury, surgery may be needed. A cartilage injury may take 4-12 weeks to heal depending on how bad it is.  Home care    Stay off the injured leg as much as possible until you can walk on it without pain. If you have a lot of pain when walking, crutches or a walker may be prescribed. (These can be rented or bought at many pharmacies and surgical or orthopedic supply stores). Follow your healthcare provider's advice about when to begin putting weight on that leg.    Keep your leg elevated to reduce pain and swelling. When sleeping, place a pillow under the injured leg. When sitting, support the injured leg so it is level with your waist. This is very important during the first 48 hours.    Apply an ice pack over the injured area for 15 to 20 minutes every 3 to 6 hours. You should do this for the first 24 to 48 hours. You can make an ice pack by filling a plastic bag that seals at the top with ice cubes and then wrapping it with a thin towel. Continue to use ice packs for relief of pain and swelling as needed. As the ice melts, be careful to avoid getting your wrap, splint, or cast wet. After 48 hours, apply heat (warm shower or warm bath) for 15 to 20 minutes several times a day, or alternate ice and heat. You can place the ice pack directly over the splint. If you have to wear a hook-and-loop knee brace, you can open it to apply the ice pack, or heat, directly to the knee. Never put ice directly on the skin. Always wrap the ice in a towel or other type of cloth.    You may use over-the-counter pain medicine to control pain, unless another pain medicine was prescribed. If you have chronic liver or kidney disease or ever had a stomach ulcer, talk with your healthcare  provider before using these medicines.    If you were given a splint, keep it dry at all times. Bathe with your splint out of the water. Protect it with a large plastic bag that is rubber-banded at the top end. If a fiberglass splint gets wet, you can dry it with a hair dryer. If you have a hook-and-loop knee brace, you can remove this to bathe, unless told otherwise.    Check with your healthcare provider before returning to sports or full work duties.  Follow-up care  Follow up with your healthcare provider, or as advised. This is usually within 1-2 weeks. Further testing may be required to check the extent of your injury.  If X-rays were taken, you will be told of any new findings that may affect your care.  Call 911  Call 911 if you have:     Shortness of breath    Chest pain  When to seek medical advice  Call your healthcare provider right away if any of these occur:    Toes or foot gets swollen, cold, blue, numb, or tingly    Pain or swelling spreads over the knee or calf    Warmth or redness appears over the knee or calf    Fever of 100.4 F (38 C) or above lasting for 24 to 48 hours  Date Last Reviewed: 11/23/2015 2000-2017 [employer]. [address] [place] [address], [place] [address]. All rights reserved. This information is not intended as a substitute for professional medical care. Always follow your healthcare professional's instructions.

## 2017-09-20 DIAGNOSIS — K21.9 GASTROESOPHAGEAL REFLUX DISEASE, ESOPHAGITIS PRESENCE NOT SPECIFIED: ICD-10-CM

## 2017-09-20 NOTE — TELEPHONE ENCOUNTER
Reason for Call:  Medication or medication refill:    Do you use a [facility]?  Name of the pharmacy and phone number for the current request:  [facility]    Name of the medication requested: Omeprazole    Omeprazole     Last Written Prescription Date: 7/25/2017  Last Fill Quantity: 90,  # refills: 3   Last Office Visit with [facility], P or [facility] prescribing provider: 7/25/2017                                             Pt said that he was taking his Omeprazole 20 mg BID.  The Rx said Daily.  He said that one tablet did not work for him but 2 tablets per day works great.  He is wondering if he can get a new script and have the orders changed.  Please advise.    Can we leave a detailed message on this number? YES    Phone number patient can be reached at: Cell number on file:    Telephone Information:   Mobile [phone]       Best Time: any    Call taken on 9/20/2017 at 4:57 PM by Lizbet Ignacio

## 2017-09-22 RX ORDER — OMEPRAZOLE 40 MG/1
40 CAPSULE, DELAYED RELEASE ORAL DAILY
Qty: 90 CAPSULE | Refills: 2 | Status: SHIPPED | OUTPATIENT
Start: 2017-09-22 | End: 2018-06-18

## 2017-09-22 NOTE — TELEPHONE ENCOUNTER
Dr. Horn,    Patient has gone up on the omeprazole not down.  Asking us to increase the prescription to 40 mg daily now.  Please advise. Flori CLEARY RN    LOV- 7/2017  1. Gastroesophageal reflux disease, esophagitis presence not specified     Not controlled on 10mg of omeprazole, so will plan to stick with 20mg.      - omeprazole (PRILOSEC) 20 MG CR capsule; Take 1 capsule (20 mg) by mouth daily  Dispense: 90 capsule; Refill: 3

## 2017-12-20 ENCOUNTER — OFFICE VISIT (OUTPATIENT)
Dept: FAMILY MEDICINE | Facility: CLINIC | Age: 42
End: 2017-12-20
Payer: COMMERCIAL

## 2017-12-20 VITALS
WEIGHT: 180 LBS | OXYGEN SATURATION: 97 % | TEMPERATURE: 97.9 F | HEIGHT: 69 IN | DIASTOLIC BLOOD PRESSURE: 85 MMHG | HEART RATE: 82 BPM | SYSTOLIC BLOOD PRESSURE: 130 MMHG | BODY MASS INDEX: 26.66 KG/M2

## 2017-12-20 DIAGNOSIS — H91.91 HEARING LOSS OF RIGHT EAR, UNSPECIFIED HEARING LOSS TYPE: ICD-10-CM

## 2017-12-20 DIAGNOSIS — R42 DIZZINESS: Primary | ICD-10-CM

## 2017-12-20 DIAGNOSIS — Z13.220 SCREENING CHOLESTEROL LEVEL: ICD-10-CM

## 2017-12-20 LAB
ALBUMIN SERPL-MCNC: 3.8 G/DL (ref 3.4–5)
ALP SERPL-CCNC: 86 U/L (ref 40–150)
ALT SERPL W P-5'-P-CCNC: 43 U/L (ref 0–70)
ANION GAP SERPL CALCULATED.3IONS-SCNC: 4 MMOL/L (ref 3–14)
AST SERPL W P-5'-P-CCNC: 24 U/L (ref 0–45)
BILIRUB SERPL-MCNC: 0.8 MG/DL (ref 0.2–1.3)
BUN SERPL-MCNC: 11 MG/DL (ref 7–30)
CALCIUM SERPL-MCNC: 8.9 MG/DL (ref 8.5–10.1)
CHLORIDE SERPL-SCNC: 104 MMOL/L (ref 94–109)
CHOLEST SERPL-MCNC: 233 MG/DL
CO2 SERPL-SCNC: 29 MMOL/L (ref 20–32)
CREAT SERPL-MCNC: 0.68 MG/DL (ref 0.66–1.25)
ERYTHROCYTE [DISTWIDTH] IN BLOOD BY AUTOMATED COUNT: 13.8 % (ref 10–15)
GFR SERPL CREATININE-BSD FRML MDRD: >90 ML/MIN/1.7M2
GLUCOSE SERPL-MCNC: 95 MG/DL (ref 70–99)
HCT VFR BLD AUTO: 47.3 % (ref 40–53)
HDLC SERPL-MCNC: 60 MG/DL
HGB BLD-MCNC: 15.3 G/DL (ref 13.3–17.7)
LDLC SERPL CALC-MCNC: 136 MG/DL
MCH RBC QN AUTO: 27.8 PG (ref 26.5–33)
MCHC RBC AUTO-ENTMCNC: 32.3 G/DL (ref 31.5–36.5)
MCV RBC AUTO: 86 FL (ref 78–100)
NONHDLC SERPL-MCNC: 173 MG/DL
PLATELET # BLD AUTO: 320 10E9/L (ref 150–450)
POTASSIUM SERPL-SCNC: 4 MMOL/L (ref 3.4–5.3)
PROT SERPL-MCNC: 8 G/DL (ref 6.8–8.8)
RBC # BLD AUTO: 5.51 10E12/L (ref 4.4–5.9)
SODIUM SERPL-SCNC: 137 MMOL/L (ref 133–144)
TRIGL SERPL-MCNC: 184 MG/DL
TSH SERPL DL<=0.005 MIU/L-ACNC: 0.44 MU/L (ref 0.4–4)
WBC # BLD AUTO: 6 10E9/L (ref 4–11)

## 2017-12-20 PROCEDURE — 36415 COLL VENOUS BLD VENIPUNCTURE: CPT | Performed by: INTERNAL MEDICINE

## 2017-12-20 PROCEDURE — 85027 COMPLETE CBC AUTOMATED: CPT | Performed by: INTERNAL MEDICINE

## 2017-12-20 PROCEDURE — 99214 OFFICE O/P EST MOD 30 MIN: CPT | Performed by: INTERNAL MEDICINE

## 2017-12-20 PROCEDURE — 80061 LIPID PANEL: CPT | Performed by: INTERNAL MEDICINE

## 2017-12-20 PROCEDURE — 84443 ASSAY THYROID STIM HORMONE: CPT | Performed by: INTERNAL MEDICINE

## 2017-12-20 PROCEDURE — 80053 COMPREHEN METABOLIC PANEL: CPT | Performed by: INTERNAL MEDICINE

## 2017-12-20 RX ORDER — PREDNISONE 20 MG/1
TABLET ORAL
Qty: 20 TABLET | Refills: 0 | Status: SHIPPED | OUTPATIENT
Start: 2017-12-20 | End: 2018-10-30

## 2017-12-20 NOTE — LETTER
"December 20, 2017      Daniele Carter  [address] [address]        Dear ,    We are writing to inform you of your test results.    Please let Frank know that his elecrolytes, kidney function, liver tests, diabetes test (glucose), blood cell counts, and thyroid function are all normal.  His total cholesterol level is high, LDL \"bad\" cholesterol level is borderline high, HDL \"good\" cholesterol level is good, and triglycerides are borderline high.  These levels are not bad enough to require medication, but he should continue to focus on making healthy diet choices and increasing his exercise levels to improve cholesterol.   Thanks.  Resulted Orders   TSH with free T4 reflex   Result Value Ref Range    TSH 0.44 0.40 - 4.00 mU/L   Lipid panel reflex to direct LDL Fasting   Result Value Ref Range    Cholesterol 233 (H) <200 mg/dL      Comment:      Desirable:       <200 mg/dl    Triglycerides 184 (H) <150 mg/dL      Comment:      Borderline high:  150-199 mg/dl  High:             200-499 mg/dl  Very high:       >499 mg/dl      HDL Cholesterol 60 >39 mg/dL    LDL Cholesterol Calculated 136 (H) <100 mg/dL      Comment:      Above desirable:  100-129 mg/dl  Borderline High:  130-159 mg/dL  High:             160-189 mg/dL  Very high:       >189 mg/dl      Non HDL Cholesterol 173 (H) <130 mg/dL      Comment:      Above Desirable:  130-159 mg/dl  Borderline high:  160-189 mg/dl  High:             190-219 mg/dl  Very high:       >219 mg/dl     CBC with platelets   Result Value Ref Range    WBC 6.0 4.0 - 11.0 10e9/L    RBC Count 5.51 4.4 - 5.9 10e12/L    Hemoglobin 15.3 13.3 - 17.7 g/dL    Hematocrit 47.3 40.0 - 53.0 %    MCV 86 78 - 100 fl    MCH 27.8 26.5 - 33.0 pg    MCHC 32.3 31.5 - 36.5 g/dL    RDW 13.8 10.0 - 15.0 %    Platelet Count 320 150 - 450 10e9/L   Comprehensive metabolic panel   Result Value Ref Range    Sodium 137 133 - 144 mmol/L    Potassium 4.0 3.4 - 5.3 mmol/L    Chloride 104 94 - " 109 mmol/L    Carbon Dioxide 29 20 - 32 mmol/L    Anion Gap 4 3 - 14 mmol/L    Glucose 95 70 - 99 mg/dL    Urea Nitrogen 11 7 - 30 mg/dL    Creatinine 0.68 0.66 - 1.25 mg/dL    GFR Estimate >90 >60 mL/min/1.7m2      Comment:      Non  GFR Calc    GFR Estimate If Black >90 >60 mL/min/1.7m2      Comment:       GFR Calc    Calcium 8.9 8.5 - 10.1 mg/dL    Bilirubin Total 0.8 0.2 - 1.3 mg/dL    Albumin 3.8 3.4 - 5.0 g/dL    Protein Total 8.0 6.8 - 8.8 g/dL    Alkaline Phosphatase 86 40 - 150 U/L    ALT 43 0 - 70 U/L    AST 24 0 - 45 U/L       If you have any questions or concerns, please call the clinic at the number listed above.       Sincerely,        Elmer Horn MD/cb

## 2017-12-20 NOTE — PATIENT INSTRUCTIONS
We'll get the blood tests today.  Schedule the hearing test and brain MRI.    Inner Ear Problems: Causes of Dizziness (Vertigo)       Benign positional vertigo (BPV)  This is the most common cause of vertigo. BPV is also called benign positional paroxysmal vertigo (BPPV). It happens when crystals in the ear canals shift into the wrong place. Vertigo usually occurs when you move your head in a certain way. This can happen when turning in bed, bending, or looking up. Because BPV comes on quickly, you should think about if you are safe to drive or do other tasks that need your full attention.  BPV:    Causes vertigo that last for seconds. Vertigo can occur several times a day, depending on body position.    Doesn t cause hearing loss    Often goes away on its own. But it but may go away sooner with treatment.  Infection or inflammation  Sometimes the semicircular canals swell and send incorrect balance signals. This problem may be caused by a viral infection. Depending on the cause, your hearing can be affected (labyrinthitis). Or your hearing can remain normal (neuronitis).  Infection or inflammation:    Causes vertigo that lasts for hours or days. The first episode is usually the worst.    Can cause hearing loss    Often goes away on its own. But it may go away sooner with treatment.  You may need vestibular rehabilitation if you have balance problems that don't go away.  Meniere s disease  This condition is uncommon. It happens when there is too much fluid in the ear canals. This causes increased pressure and swelling. It affects balance and hearing signals.  Meniere s disease may:    Cause vertigo that last for hours    Cause hearing problems that come and go. The problems are usually in one ear and get worse over time.    Cause buzzing or ringing in the ears (tinnitus)    Cause a feeling of fullness or pressure in the ear    Cause any of these symptoms: vertigo, hearing loss, tinnitus, or ear fullness to last a  lifetime  Date Last Reviewed: 11/1/2016 2000-2017 [employer]. [address] [place] [address], [place] [address]. All rights reserved. This information is not intended as a substitute for professional medical care. Always follow your healthcare professional's instructions.

## 2017-12-20 NOTE — NURSING NOTE
"Chief Complaint   Patient presents with     Fatigue     x 2 months, dizziness     Imm/Inj     flu vaccine deferred        Initial /85 (BP Location: Left arm, Patient Position: Chair, Cuff Size: Adult Regular)  Pulse 82  Temp 97.9  F (36.6  C) (Tympanic)  Ht 5' 9\" (1.753 m)  Wt 180 lb (81.6 kg)  SpO2 97%  BMI 26.58 kg/m2 Estimated body mass index is 26.58 kg/(m^2) as calculated from the following:    Height as of this encounter: 5' 9\" (1.753 m).    Weight as of this encounter: 180 lb (81.6 kg).  Medication Reconciliation: complete   Lizbet JAMES CMA (AAMA)    "

## 2017-12-20 NOTE — MR AVS SNAPSHOT
After Visit Summary   12/20/2017    Daniele Carter    MRN: [id number]           Patient Information     Date Of Birth          1975        Visit Information        Provider Department      12/20/2017 7:20 AM Elmer Horn MD [facility]        Today's Diagnoses     Dizziness    -  1    Screening cholesterol level        Hearing loss of right ear, unspecified hearing loss type          Care Instructions    We'll get the blood tests today.  Schedule the hearing test and brain MRI.    Inner Ear Problems: Causes of Dizziness (Vertigo)       Benign positional vertigo (BPV)  This is the most common cause of vertigo. BPV is also called benign positional paroxysmal vertigo (BPPV). It happens when crystals in the ear canals shift into the wrong place. Vertigo usually occurs when you move your head in a certain way. This can happen when turning in bed, bending, or looking up. Because BPV comes on quickly, you should think about if you are safe to drive or do other tasks that need your full attention.  BPV:    Causes vertigo that last for seconds. Vertigo can occur several times a day, depending on body position.    Doesn t cause hearing loss    Often goes away on its own. But it but may go away sooner with treatment.  Infection or inflammation  Sometimes the semicircular canals swell and send incorrect balance signals. This problem may be caused by a viral infection. Depending on the cause, your hearing can be affected (labyrinthitis). Or your hearing can remain normal (neuronitis).  Infection or inflammation:    Causes vertigo that lasts for hours or days. The first episode is usually the worst.    Can cause hearing loss    Often goes away on its own. But it may go away sooner with treatment.  You may need vestibular rehabilitation if you have balance problems that don't go away.  Meniere s disease  This condition is uncommon. It happens when there is too much fluid in the ear canals. This  causes increased pressure and swelling. It affects balance and hearing signals.  Meniere s disease may:    Cause vertigo that last for hours    Cause hearing problems that come and go. The problems are usually in one ear and get worse over time.    Cause buzzing or ringing in the ears (tinnitus)    Cause a feeling of fullness or pressure in the ear    Cause any of these symptoms: vertigo, hearing loss, tinnitus, or ear fullness to last a lifetime  Date Last Reviewed: 11/1/2016 2000-2017 [employer]. [address] [place] [address]. All rights reserved. This information is not intended as a substitute for professional medical care. Always follow your healthcare professional's instructions.                Follow-ups after your visit        Additional Services     AUDIOLOGY ADULT REFERRAL       Your provider has referred you to: [facility] [phone]   [email or web address]    Specialty Testing:  Audiogram w/Tymps and Reflexes (Comprehensive Audiology Evaluation)                  Future tests that were ordered for you today     Open Future Orders        Priority Expected Expires Ordered    MR Brain w/o & w Contrast Routine  12/20/2018 12/20/2017            Who to contact     If you have questions or need follow up information about today's clinic visit or your schedule please contact [facility] directly at [phone].  Normal or non-critical lab and imaging results will be communicated to you by MyChart, letter or phone within 4 business days after the clinic has received the results. If you do not hear from us within 7 days, please contact the clinic through MyChart or phone. If you have a critical or abnormal lab result, we will notify you by phone as soon as possible.  Submit refill requests through [email or web address] or call your pharmacy and they will forward the refill request to us. Please allow 3 business days for your  "refill to be completed.          Additional Information About Your Visit        [employer] Information     [employer] lets you send messages to your doctor, view your test results, renew your prescriptions, schedule appointments and more. To sign up, go to [email or web address] . Click on \"Log in\" on the left side of the screen, which will take you to the Welcome page. Then click on \"Sign up Now\" on the right side of the page.     You will be asked to enter the access code listed below, as well as some personal information. Please follow the directions to create your username and password.     Your access code is: [email or web address]  Expires: 3/20/2018  8:08 AM     Your access code will  in 90 days. If you need help or a new code, please call your [place] clinic or [phone].        Care EveryWhere ID     This is your [facility] EveryWhere ID. This could be used by other organizations to access your [place] medical records  [id number]        Your Vitals Were     Pulse Temperature Height Pulse Oximetry BMI (Body Mass Index)       82 97.9  F (36.6  C) (Tympanic) 5' 9\" (1.753 m) 97% 26.58 kg/m2        Blood Pressure from Last 3 Encounters:   17 130/85   17 120/74   17 115/82    Weight from Last 3 Encounters:   17 180 lb (81.6 kg)   17 180 lb (81.6 kg)   17 177 lb (80.3 kg)              We Performed the Following     AUDIOLOGY ADULT REFERRAL     CBC with platelets     Comprehensive metabolic panel     Lipid panel reflex to direct LDL Fasting     TSH with free T4 reflex          Today's Medication Changes          These changes are accurate as of: 17  8:08 AM.  If you have any questions, ask your nurse or doctor.               Start taking these medicines.        Dose/Directions    predniSONE 20 MG tablet   Commonly known as:  DELTASONE   Used for:  Dizziness   Started by:  Elmer Horn MD        Take 3 tabs (60 mg) by mouth daily x 5 days, 2 tabs (40 mg) daily x 1 day, 1.5 tab " (30 mg) daily x 1 day, 1 tab (20mg) daily x 1 day, then 1/2 tab (10 mg) x 1 day.   Quantity:  20 tablet   Refills:  0            Where to get your medicines      Some of these will need a paper prescription and others can be bought over the counter.  Ask your nurse if you have questions.     Bring a paper prescription for each of these medications     predniSONE 20 MG tablet                Primary Care Provider Office Phone # Fax #    Shola Mccormick [phone] [address] [facility] [address]        Equal Access to Services     [facility] : Hadii alton ku hadasho Soomaali, waaxda luqadaha, qaybta kaalmada adeegyada, waxkimberly price . So [facility] [phone].    ATENCIÓN: Si habla español, tiene a hdz disposición servicios gratuitos de asistencia lingüística. Llame al [phone].    We comply with applicable federal civil rights laws and [place] laws. We do not discriminate on the basis of race, color, national origin, age, disability, sex, sexual orientation, or gender identity.            Thank you!     Thank you for choosing [facility]  for your care. Our goal is always to provide you with excellent care. Hearing back from our patients is one way we can continue to improve our services. Please take a few minutes to complete the written survey that you may receive in the mail after your visit with us. Thank you!             Your Updated Medication List - Protect others around you: Learn how to safely use, store and throw away your medicines at www.disposemymeds.org.          This list is accurate as of: 12/20/17  8:08 AM.  Always use your most recent med list.                   Brand Name Dispense Instructions for use Diagnosis    albuterol 108 (90 BASE) MCG/ACT Inhaler    PROAIR HFA/PROVENTIL HFA/VENTOLIN HFA    1 Inhaler    Inhale 2 puffs into the lungs every 6 hours This is your rescue inhaler.    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    Mild persistent asthma without complication       loratadine 10 MG tablet    CLARITIN    30 tablet    Take 1 tablet (10 mg) by mouth daily as needed for allergies    Seasonal allergic rhinitis, unspecified allergic rhinitis trigger       omeprazole 40 MG capsule    priLOSEC    90 capsule    Take 1 capsule (40 mg) by mouth daily    Gastroesophageal reflux disease, esophagitis presence not specified       predniSONE 20 MG tablet    DELTASONE    20 tablet    Take 3 tabs (60 mg) by mouth daily x 5 days, 2 tabs (40 mg) daily x 1 day, 1.5 tab (30 mg) daily x 1 day, 1 tab (20mg) daily x 1 day, then 1/2 tab (10 mg) x 1 day.    Dizziness

## 2017-12-20 NOTE — PROGRESS NOTES
SUBJECTIVE:   Daniele Carter is a 42 year old male who presents to clinic today for the following health issues:  Chief Complaint   Patient presents with     Fatigue     x 2 months, dizziness     Imm/Inj     flu vaccine deferred      Dizziness      Duration: x 2 months, occurring 3 - 4 times monthly lasting from 10 minutes to 1 hour.      Description   Feeling faint:  YES- a couple of times patient reports falling.    Feeling like the surroundings are moving: yes, a little bit   Loss of consciousness or falls: YES- no loss of consciousness, just fell     Intensity:  mild    Accompanying signs and symptoms:   Nausea/vomitting: no   Palpitations: yes maybe a little bit, wondering if acid reflux   Weakness in arms or legs: YES- a little tingling in arms and sinus pressure in head  Vision or speech changes: no   Ringing in ears (Tinnitus): YES- sometimes   Hearing loss related to dizziness: YES  Other (fevers/chills/sweating/dyspnea): YES- gets cold and sweating profusely and feels some burning on occasion.  This occurred last night.  He does have some allergies to their new dog- gets itchy, watery eyes and rhinorrhea and sinus congestion    History (similar episodes/head trauma/previous evaluation/recent bleeding): None    Precipitating or alleviating factors (new meds/chemicals): None  Worse with activity/head movement: YES- if patient gets up and walks around    Therapies tried and outcome: will eat something and that helps, lays down to relieve dizziness.       Frank presents with 2 months of dizzy episodes.  Last month these only happened about 3-4 times, but this month it has gotten more frequent- about 1-2 times per week.  These last 10 min to and hour and he feels like things are spinning.  Fell over a couple of times symptoms were so bad- no LOC.  He's had some ongoing hearing loss for the past year or so.     Problem list and histories reviewed & adjusted, as indicated.  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     CARDIOVASCULAR SCREENING; LDL GOAL LESS THAN 160     Mild persistent asthma     Past Surgical History:   Procedure Laterality Date     COLONOSCOPY  5/6/2011    Procedure:COLONOSCOPY; Surgeon:JONAS JAIN; Location:[facility]     ESOPHAGOSCOPY, GASTROSCOPY, DUODENOSCOPY (EGD), COMBINED N/A 3/28/2017    Procedure: COMBINED ESOPHAGOSCOPY, GASTROSCOPY, DUODENOSCOPY (EGD), BIOPSY SINGLE OR MULTIPLE;  Surgeon: Nikolai Valladares MD;  Location: [facility]       Social History   Substance Use Topics     Smoking status: Never Smoker     Smokeless tobacco: Never Used     Alcohol use Yes      Comment: weekends     Family History   Problem Relation Age of Onset     CANCER Father      lymphoma     DIABETES Father      type 2     HEART DISEASE Father      DIABETES Maternal Grandfather      Lipids Mother      Alzheimer Disease Mother          Current Outpatient Prescriptions   Medication Sig Dispense Refill     predniSONE (DELTASONE) 20 MG tablet Take 3 tabs (60 mg) by mouth daily x 5 days, 2 tabs (40 mg) daily x 1 day, 1.5 tab (30 mg) daily x 1 day, 1 tab (20mg) daily x 1 day, then 1/2 tab (10 mg) x 1 day. 20 tablet 0     omeprazole (PRILOSEC) 40 MG capsule Take 1 capsule (40 mg) by mouth daily 90 capsule 2     fluticasone (FLOVENT HFA) 110 MCG/ACT Inhaler Inhale 2 puffs into the lungs 2 times daily This is your controller inhaler and use daily. 3 Inhaler 3     cyclobenzaprine (FLEXERIL) 10 MG tablet Take 1 tablet (10 mg) by mouth 3 times daily as needed for muscle spasms 40 tablet 3     loratadine (CLARITIN) 10 MG tablet Take 1 tablet (10 mg) by mouth daily as needed for allergies 30 tablet 5     albuterol (PROAIR HFA/PROVENTIL HFA/VENTOLIN HFA) 108 (90 BASE) MCG/ACT Inhaler Inhale 2 puffs into the lungs every 6 hours This is your rescue inhaler. 1 Inhaler 4     EPINEPHrine (EPIPEN  "2-RONAN) 0.3 MG/0.3ML injection Inject 0.3 mLs (0.3 mg) into the muscle once as needed for anaphylaxis Hold on file until needed 2 each 3     Allergies   Allergen Reactions     Asa [Aspirin]      Unknown reaction- Hives???/     Bee Anaphylaxis     Chocolate Flavor Hives         Reviewed and updated as needed this visit by clinical staffAllergies       Reviewed and updated as needed this visit by Provider         ROS:  Constitutional, HEENT, cardiovascular, neuro systems are negative, except as otherwise noted.      OBJECTIVE:   /85 (BP Location: Left arm, Patient Position: Chair, Cuff Size: Adult Regular)  Pulse 82  Temp 97.9  F (36.6  C) (Tympanic)  Ht 5' 9\" (1.753 m)  Wt 180 lb (81.6 kg)  SpO2 97%  BMI 26.58 kg/m2  Body mass index is 26.58 kg/(m^2).  GENERAL: healthy, alert and no distress  HENT: ear canals and TM's normal, nose and mouth without ulcers or lesions  RESP: lungs clear to auscultation - no rales, rhonchi or wheezes  CV: regular rate and rhythm, normal S1 S2, no S3 or S4, no murmur, click or rub, no peripheral edema   NEURO: Cranial nerves intact, normal strength and tone, sensory exam grossly normal, mentation intact, DTR's normal and symmetric at patellas, gait normal including heel/toe/tandem walking and Romberg normal, normal finger to nose and heel to shin tests, negative [place]-Hallpike.  Decreased hearing to finger rub on R compared to L      Diagnostic Test Results:  Results for orders placed or performed in visit on 12/20/17 (from the past 24 hour(s))   TSH with free T4 reflex   Result Value Ref Range    TSH 0.44 0.40 - 4.00 mU/L   Lipid panel reflex to direct LDL Fasting   Result Value Ref Range    Cholesterol 233 (H) <200 mg/dL    Triglycerides 184 (H) <150 mg/dL    HDL Cholesterol 60 >39 mg/dL    LDL Cholesterol Calculated 136 (H) <100 mg/dL    Non HDL Cholesterol 173 (H) <130 mg/dL   CBC with platelets   Result Value Ref Range    WBC 6.0 4.0 - 11.0 10e9/L    RBC Count 5.51 4.4 - " 5.9 10e12/L    Hemoglobin 15.3 13.3 - 17.7 g/dL    Hematocrit 47.3 40.0 - 53.0 %    MCV 86 78 - 100 fl    MCH 27.8 26.5 - 33.0 pg    MCHC 32.3 31.5 - 36.5 g/dL    RDW 13.8 10.0 - 15.0 %    Platelet Count 320 150 - 450 10e9/L   Comprehensive metabolic panel   Result Value Ref Range    Sodium 137 133 - 144 mmol/L    Potassium 4.0 3.4 - 5.3 mmol/L    Chloride 104 94 - 109 mmol/L    Carbon Dioxide 29 20 - 32 mmol/L    Anion Gap 4 3 - 14 mmol/L    Glucose 95 70 - 99 mg/dL    Urea Nitrogen 11 7 - 30 mg/dL    Creatinine 0.68 0.66 - 1.25 mg/dL    GFR Estimate >90 >60 mL/min/1.7m2    GFR Estimate If Black >90 >60 mL/min/1.7m2    Calcium 8.9 8.5 - 10.1 mg/dL    Bilirubin Total 0.8 0.2 - 1.3 mg/dL    Albumin 3.8 3.4 - 5.0 g/dL    Protein Total 8.0 6.8 - 8.8 g/dL    Alkaline Phosphatase 86 40 - 150 U/L    ALT 43 0 - 70 U/L    AST 24 0 - 45 U/L       ASSESSMENT/PLAN:       1. Dizziness  2. Hearing loss of right ear, unspecified hearing loss type    Frank presents with two months of worsening dizzy episodes.  [place]-Hallpike was normal and his symptoms do not sound consistent with BPPV.  He has had some hearing loss, so could be vestibular neuritis or Meniere's, but the hearing loss has been going on longer than the dizziness.  No focal neuro signs on exam.  Labs unremarkable.  Given the hearing changes, will get both hearing eval and MRI.  Will try a course of prednisone in case this is vestibular neuritis.      - MR Brain w/o & w Contrast; Future  - TSH with free T4 reflex  - CBC with platelets  - Comprehensive metabolic panel  - predniSONE (DELTASONE) 20 MG tablet; Take 3 tabs (60 mg) by mouth daily x 5 days, 2 tabs (40 mg) daily x 1 day, 1.5 tab (30 mg) daily x 1 day, 1 tab (20mg) daily x 1 day, then 1/2 tab (10 mg) x 1 day.  Dispense: 20 tablet; Refill: 0  - AUDIOLOGY ADULT REFERRAL    3. Screening cholesterol level    LDL slightly elevated, continue to monitor.     - Lipid panel reflex to direct LDL Fasting      Elmer Horn,  MD  [facility]

## 2017-12-21 ENCOUNTER — HOSPITAL ENCOUNTER (OUTPATIENT)
Dept: GENERAL RADIOLOGY | Facility: CLINIC | Age: 42
End: 2017-12-21
Attending: INTERNAL MEDICINE
Payer: COMMERCIAL

## 2017-12-21 ENCOUNTER — HOSPITAL ENCOUNTER (OUTPATIENT)
Dept: MRI IMAGING | Facility: CLINIC | Age: 42
Discharge: HOME | End: 2017-12-21
Attending: INTERNAL MEDICINE | Admitting: INTERNAL MEDICINE
Payer: COMMERCIAL

## 2017-12-21 DIAGNOSIS — R42 DIZZINESS: ICD-10-CM

## 2017-12-21 DIAGNOSIS — Z01.89 ENCOUNTER FOR IMAGING TO SCREEN FOR METAL PRIOR TO MAGNETIC RESONANCE IMAGING (MRI): ICD-10-CM

## 2017-12-21 PROCEDURE — A9585 GADOBUTROL INJECTION: HCPCS | Performed by: INTERNAL MEDICINE

## 2017-12-21 PROCEDURE — 70030 X-RAY EYE FOR FOREIGN BODY: CPT

## 2017-12-21 PROCEDURE — 70553 MRI BRAIN STEM W/O & W/DYE: CPT

## 2017-12-21 PROCEDURE — 25000128 H RX IP 250 OP 636: Performed by: INTERNAL MEDICINE

## 2017-12-21 RX ORDER — GADOBUTROL 604.72 MG/ML
8 INJECTION INTRAVENOUS ONCE
Status: COMPLETED | OUTPATIENT
Start: 2017-12-21 | End: 2017-12-21

## 2017-12-21 RX ADMIN — GADOBUTROL 8 ML: 604.72 INJECTION INTRAVENOUS at 09:17

## 2018-06-18 DIAGNOSIS — K21.9 GASTROESOPHAGEAL REFLUX DISEASE, ESOPHAGITIS PRESENCE NOT SPECIFIED: ICD-10-CM

## 2018-06-18 NOTE — TELEPHONE ENCOUNTER
"Requested Prescriptions   Pending Prescriptions Disp Refills     omeprazole (PRILOSEC) 40 MG capsule [Pharmacy Med Name: OMEPRAZOLE DR 40MG  CAP]  Last Written Prescription Date:  09/22/17  Last Fill Quantity: 90,  # refills: 2   Last office visit: 12/20/2017 with prescribing provider:  12/20/17   Future Office Visit:     90 capsule 2     Sig: TAKE ONE CAPSULE BY MOUTH ONCE DAILY    PPI Protocol Passed    6/18/2018  7:31 AM       Passed - Not on Clopidogrel (unless Pantoprazole ordered)       Passed - No diagnosis of osteoporosis on record       Passed - Recent (12 mo) or future (30 days) visit within the authorizing provider's specialty    Patient had office visit in the last 12 months or has a visit in the next 30 days with authorizing provider or within the authorizing provider's specialty.  See \"Patient Info\" tab in inbasket, or \"Choose Columns\" in Meds & Orders section of the refill encounter.           Passed - Patient is age 18 or older          "

## 2018-06-20 RX ORDER — OMEPRAZOLE 40 MG/1
CAPSULE, DELAYED RELEASE ORAL
Qty: 90 CAPSULE | Refills: 1 | Status: SHIPPED | OUTPATIENT
Start: 2018-06-20 | End: 2018-12-27

## 2018-10-15 ENCOUNTER — OFFICE VISIT (OUTPATIENT)
Dept: FAMILY MEDICINE | Facility: CLINIC | Age: 43
End: 2018-10-15
Payer: COMMERCIAL

## 2018-10-15 VITALS
BODY MASS INDEX: 27.11 KG/M2 | SYSTOLIC BLOOD PRESSURE: 124 MMHG | OXYGEN SATURATION: 97 % | HEART RATE: 68 BPM | TEMPERATURE: 98.2 F | DIASTOLIC BLOOD PRESSURE: 78 MMHG | WEIGHT: 183 LBS | HEIGHT: 69 IN

## 2018-10-15 DIAGNOSIS — R07.0 THROAT PAIN: Primary | ICD-10-CM

## 2018-10-15 LAB
DEPRECATED S PYO AG THROAT QL EIA: NORMAL
SPECIMEN SOURCE: NORMAL

## 2018-10-15 PROCEDURE — 87880 STREP A ASSAY W/OPTIC: CPT | Performed by: INTERNAL MEDICINE

## 2018-10-15 PROCEDURE — 87081 CULTURE SCREEN ONLY: CPT | Performed by: INTERNAL MEDICINE

## 2018-10-15 PROCEDURE — 99213 OFFICE O/P EST LOW 20 MIN: CPT | Performed by: INTERNAL MEDICINE

## 2018-10-15 RX ORDER — SUCRALFATE 1 G/1
1 TABLET ORAL 4 TIMES DAILY
Qty: 40 TABLET | Refills: 1 | Status: SHIPPED | OUTPATIENT
Start: 2018-10-15 | End: 2019-11-06

## 2018-10-15 NOTE — MR AVS SNAPSHOT
"              After Visit Summary   10/15/2018    Daniele Carter    MRN: [id number]           Patient Information     Date Of Birth          1975        Visit Information        Provider Department      10/15/2018 8:20 AM Elmer Horn MD [facility]        Today's Diagnoses     Throat pain    -  1      Care Instructions    Follow-up as needed if not improving in a week or new/worsening symptoms develop.             Follow-ups after your visit        Who to contact     If you have questions or need follow up information about today's clinic visit or your schedule please contact [facility] directly at [phone].  Normal or non-critical lab and imaging results will be communicated to you by MyChart, letter or phone within 4 business days after the clinic has received the results. If you do not hear from us within 7 days, please contact the clinic through [employer] or phone. If you have a critical or abnormal lab result, we will notify you by phone as soon as possible.  Submit refill requests through [employer] or call your pharmacy and they will forward the refill request to us. Please allow 3 business days for your refill to be completed.          Additional Information About Your Visit        MyChart Information     [employer] lets you send messages to your doctor, view your test results, renew your prescriptions, schedule appointments and more. To sign up, go to [email or web address] . Click on \"Log in\" on the left side of the screen, which will take you to the Welcome page. Then click on \"Sign up Now\" on the right side of the page.     You will be asked to enter the access code listed below, as well as some personal information. Please follow the directions to create your username and password.     Your access code is: [email or web address]  Expires: 2019  9:07 AM     Your access code will  in 90 days. If you need help or a new code, please call your [facility] or " "[phone].        Care EveryWhere ID     This is your Care EveryWhere ID. This could be used by other organizations to access your [facility] medical records  [id number]        Your Vitals Were     Pulse Temperature Height Pulse Oximetry BMI (Body Mass Index)       68 98.2  F (36.8  C) (Tympanic) 5' 9\" (1.753 m) 97% 27.02 kg/m2        Blood Pressure from Last 3 Encounters:   10/15/18 124/78   12/20/17 130/85   07/25/17 120/74    Weight from Last 3 Encounters:   10/15/18 183 lb (83 kg)   12/20/17 180 lb (81.6 kg)   07/25/17 180 lb (81.6 kg)              We Performed the Following     Beta strep group A culture     Rapid strep screen          Today's Medication Changes          These changes are accurate as of 10/15/18  9:07 AM.  If you have any questions, ask your nurse or doctor.               Start taking these medicines.        Dose/Directions    sucralfate 1 GM tablet   Commonly known as:  CARAFATE   Used for:  Throat pain   Started by:  Elmer Horn MD        Dose:  1 g   Take 1 tablet (1 g) by mouth 4 times daily   Quantity:  40 tablet   Refills:  1            Where to get your medicines      These medications were sent to [facility] [address] [place] [id number]     Phone:  [phone]     sucralfate 1 GM tablet                Primary Care Provider Office Phone # Fax #    Shola Mccormick [phone] [address] [facility] [address]        Equal Access to Services     [facility] CenterTRAN AH: Hadii alton gaffney hadasho Somylene, waaxda luqadaha, qaybta kaalmaana mckeonkimberly idimike oquendo. So [facility] [phone].    ATENCIÓN: Si habla español, tiene a hdz disposición servicios gratuitos de asistencia lingüística. Llame al [phone].    We comply with applicable federal civil rights laws and [place] laws. We do not discriminate on the basis of race, color, national origin, age, disability, sex, sexual " orientation, or gender identity.            Thank you!     Thank you for choosing [facility]  for your care. Our goal is always to provide you with excellent care. Hearing back from our patients is one way we can continue to improve our services. Please take a few minutes to complete the written survey that you may receive in the mail after your visit with us. Thank you!             Your Updated Medication List - Protect others around you: Learn how to safely use, store and throw away your medicines at www.disposemymeds.org.          This list is accurate as of 10/15/18  9:07 AM.  Always use your most recent med list.                   Brand Name Dispense Instructions for use Diagnosis    albuterol 108 (90 Base) MCG/ACT inhaler    PROAIR HFA/PROVENTIL HFA/VENTOLIN HFA    1 Inhaler    Inhale 2 puffs into the lungs every 6 hours This is your rescue inhaler.    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    Mild persistent asthma without complication       loratadine 10 MG tablet    CLARITIN    30 tablet    Take 1 tablet (10 mg) by mouth daily as needed for allergies    Seasonal allergic rhinitis, unspecified allergic rhinitis trigger       omeprazole 40 MG capsule    priLOSEC    90 capsule    TAKE ONE CAPSULE BY MOUTH ONCE DAILY    Gastroesophageal reflux disease, esophagitis presence not specified       predniSONE 20 MG tablet    DELTASONE    20 tablet    Take  3 tabs (60 mg) by mouth daily x 5 days, 2 tabs (40 mg) daily x 1 day, 1.5 tab (30 mg) daily x 1 day, 1 tab (20mg) daily x 1 day, then 1/2 tab (10 mg) x 1 day.    Dizziness       sucralfate 1 GM tablet    CARAFATE    40 tablet    Take 1 tablet (1 g) by mouth 4 times daily    Throat pain

## 2018-10-15 NOTE — PROGRESS NOTES
SUBJECTIVE:   Daniele Carter is a 43 year old male who presents to clinic today for the following health issues:    GERD/Heartburn/Throat pain  Onset: 2 weeks    Description:     Burning in chest: YES    Intensity: moderate    Progression of Symptoms: intermittent and waxing and waning    Accompanying Signs & Symptoms:  Does it feel like food gets stuck: YES- food goes down but it hurts  Nausea: YES  Vomiting (bloody?): no   Abdominal Pain: YES- LLQ for about 4 months, worse the past few weeks, pain is on and off about 2-3 times per day lasting about 20-30 minutes with eating and drinking  Black-Tarry stools: no :  Bloody stools: YES- red blood with wiping    History:   Previous ulcers: no    Precipitating factors:   Caffeine use: YES- 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  Pt. started taking a magnesium supplement just prior to symptoms starting about a month ago, he stopped it about a week ago with some improvement the past couple of days but worse again today    Alleviating factors:  No known    Therapies Tried and outcome:none        Problem list and histories reviewed & adjusted, as indicated.  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     CARDIOVASCULAR SCREENING; LDL GOAL LESS THAN 160     Mild persistent asthma     Past Surgical History:   Procedure Laterality Date     COLONOSCOPY  5/6/2011    Procedure:COLONOSCOPY; Surgeon:JONAS JAIN; Location:[facility]     ESOPHAGOSCOPY, GASTROSCOPY, DUODENOSCOPY (EGD), COMBINED N/A 3/28/2017    Procedure: COMBINED ESOPHAGOSCOPY, GASTROSCOPY, DUODENOSCOPY (EGD), BIOPSY SINGLE OR MULTIPLE;  Surgeon: Nikolai Valladares MD;  Location: [facility]       Social History   Substance Use Topics     Smoking status: Never  Smoker     Smokeless tobacco: Never Used     Alcohol use Yes      Comment: weekends     Family History   Problem Relation Age of Onset     Cancer Father      lymphoma     Diabetes Father      type 2     HEART DISEASE Father      Diabetes Maternal Grandfather      Lipids Mother      Alzheimer Disease Mother          Current Outpatient Prescriptions   Medication Sig Dispense Refill     albuterol (PROAIR HFA/PROVENTIL HFA/VENTOLIN HFA) 108 (90 BASE) MCG/ACT Inhaler Inhale 2 puffs into the lungs every 6 hours This is your rescue inhaler. 1 Inhaler 4     cyclobenzaprine (FLEXERIL) 10 MG tablet Take 1 tablet (10 mg) by mouth 3 times daily as needed for muscle spasms 40 tablet 3     EPINEPHrine (EPIPEN 2-RONAN) 0.3 MG/0.3ML injection Inject 0.3 mLs (0.3 mg) into the muscle once as needed for anaphylaxis Hold on file until needed 2 each 3     fluticasone (FLOVENT HFA) 110 MCG/ACT Inhaler Inhale 2 puffs into the lungs 2 times daily This is your controller inhaler and use daily. 3 Inhaler 3     loratadine (CLARITIN) 10 MG tablet Take 1 tablet (10 mg) by mouth daily as needed for allergies 30 tablet 5     omeprazole (PRILOSEC) 40 MG capsule TAKE ONE CAPSULE BY MOUTH ONCE DAILY 90 capsule 1     sucralfate (CARAFATE) 1 GM tablet Take 1 tablet (1 g) by mouth 4 times daily 40 tablet 1     predniSONE (DELTASONE) 20 MG tablet Take 3 tabs (60 mg) by mouth daily x 5 days, 2 tabs (40 mg) daily x 1 day, 1.5 tab (30 mg) daily x 1 day, 1 tab (20mg) daily x 1 day, then 1/2 tab (10 mg) x 1 day. (Patient not taking: Reported on 10/15/2018) 20 tablet 0     Allergies   Allergen Reactions     Asa [Aspirin]      Unknown reaction- Hives???/     Bee Anaphylaxis     Chocolate Flavor Hives       Reviewed and updated as needed this visit by clinical staff       Reviewed and updated as needed this visit by Provider         ROS:  Constitutional, HEENT, GI systems are negative, except as otherwise noted.    OBJECTIVE:     /78  Pulse 68  Temp 98.2  " F (36.8  C) (Tympanic)  Ht 5' 9\" (1.753 m)  Wt 183 lb (83 kg)  SpO2 97%  BMI 27.02 kg/m2  Body mass index is 27.02 kg/(m^2).  GENERAL: healthy, alert and no distress  HENT: ear canals and TM's normal, nose and mouth without ulcers or lesions  NECK: no adenopathy, no asymmetry, masses, or scars and thyroid normal to palpation  RESP: lungs clear to auscultation - no rales, rhonchi or wheezes  CV: regular rate and rhythm, normal S1 S2, no S3 or S4, no murmur, click or rub  ABDOMEN: soft, mild epigastric and LLQ pain, no hepatosplenomegaly, no masses and bowel sounds normal    Diagnostic Test Results:  Results for orders placed or performed in visit on 10/15/18 (from the past 24 hour(s))   Rapid strep screen   Result Value Ref Range    Specimen Description Throat     Rapid Strep A Screen       NEGATIVE: No Group A streptococcal antigen detected by immunoassay, await culture report.       ASSESSMENT/PLAN:       1. Throat pain    Frank presents with 2 weeks of throat pain after starting a magnesium supplement.  Possible pill esophagitis based on the history, though I don't know that magnesium is strongly associated with this.  Could just be a flare of reflux from drinking more coffee.  Rapid strep negative, no masses detected on oral or neck exam.  Will continue PPI, add carafate, decrease coffee and see if symptoms improve.  Follow-up as needed if not improving in a week or new/worsening symptoms develop and may consider EGD.  He did have EGD last year with multiple gastric polyps that were biopsy but otherwise was normal.     - Rapid strep screen  - Beta strep group A culture  - sucralfate (CARAFATE) 1 GM tablet; Take 1 tablet (1 g) by mouth 4 times daily  Dispense: 40 tablet; Refill: 1    Elmer Horn MD  [facility]    "

## 2018-10-15 NOTE — NURSING NOTE
"Initial There were no vitals taken for this visit. Estimated body mass index is 26.58 kg/(m^2) as calculated from the following:    Height as of 12/20/17: 5' 9\" (1.753 m).    Weight as of 12/20/17: 180 lb (81.6 kg). .      "

## 2018-10-16 LAB
BACTERIA SPEC CULT: NORMAL
SPECIMEN SOURCE: NORMAL

## 2018-10-26 ENCOUNTER — TELEPHONE (OUTPATIENT)
Dept: PEDIATRICS | Facility: CLINIC | Age: 43
End: 2018-10-26

## 2018-10-26 DIAGNOSIS — R07.0 THROAT PAIN: Primary | ICD-10-CM

## 2018-10-26 NOTE — TELEPHONE ENCOUNTER
"S-(situation): Persistent throat pain.    B-(background): Seen in clinic 10/15/18 by Dr. Horn for GERD/Heartburn/throat pain.    A-(assessment):   \"Feels like it's a constant choking, once in a while a cough.\"  \"Still very painful at the back of my throat, by my tonsils.\"  \"Kind of all the time pressure,\" when he woke up this morning it was a lot worse.   This morning when he woke up it was \"hard to breathe, choking the hell out of me and I took the [place] and it got better. It's kind of annoying.\"  He says he has acid reflux and takes omeprazole every day. \"I kind of missed the past few days. I took one this morning though.\"   Lat night he had trouble swallowing when he was eating tacos.  \"Drinks I can handle. Other than that I feel fine.\" Questioned report of abdominal pain, says he has left sided abdominal pain towards hip bone: \"I've had that problem once before and I can't remember what the heck they did for me.\"   No new meds or foods, any fish or anything with bones.   \"Feels like something is stuck back there and it's choking me.\"  Pt reports he has quit the magnesium supplement three weeks, hasn't had coffee in \"a long time\" either, or a beer.  Says he took the sucralfate and he \"ran out\" but he didn't notice any difference from when he was taking it and now.     R-(recommendations): Notes from OV reviewed; was recommend pt f/u if not improving in week or with new/worsening sx. Discussed with pt possibility of EGD being needed. Pt agreeable to this; said he could wait until Monday to see Dr. Horn, writer will route note to see if pt could be seen today.    Lidia GUERRERO RN              "

## 2018-10-26 NOTE — TELEPHONE ENCOUNTER
If he is having such severe symptoms, he may want to consider going to urgent care soon instead of waiting for clinic appointment.  If he does not want to do that, I would actually recommend seeing ENT next based on what he is describing.  I have placed a referral, so he can get that scheduled.    Thanks,  Elmer Horn MD

## 2018-10-26 NOTE — TELEPHONE ENCOUNTER
"Spoke with pt, message from Dr. Horn below relayed, \"I'll just go with that, if it gets worse I'll go in.\"  Discussed avoiding foods that can be difficult to swallow (i.e., bread, etc).  Pt verbalized understanding and agreement with plan.    Lidia GUERRERO RN      "

## 2018-10-26 NOTE — TELEPHONE ENCOUNTER
Patient called stating that he was seen 10/15-- for a sore throat. Hard to breathe, throat feels like the is choking. And left side abdominal pain.     Denies fever.         Linh CHAPMAN  Station

## 2018-10-30 ENCOUNTER — OFFICE VISIT (OUTPATIENT)
Dept: OTOLARYNGOLOGY | Facility: CLINIC | Age: 43
End: 2018-10-30
Payer: COMMERCIAL

## 2018-10-30 VITALS
TEMPERATURE: 98.7 F | BODY MASS INDEX: 27.11 KG/M2 | HEIGHT: 69 IN | WEIGHT: 183 LBS | SYSTOLIC BLOOD PRESSURE: 112 MMHG | DIASTOLIC BLOOD PRESSURE: 64 MMHG | HEART RATE: 77 BPM

## 2018-10-30 DIAGNOSIS — J45.30 MILD PERSISTENT ASTHMA WITHOUT COMPLICATION: ICD-10-CM

## 2018-10-30 DIAGNOSIS — K21.9 LPRD (LARYNGOPHARYNGEAL REFLUX DISEASE): ICD-10-CM

## 2018-10-30 DIAGNOSIS — R07.0 THROAT PAIN: Primary | ICD-10-CM

## 2018-10-30 PROCEDURE — 99204 OFFICE O/P NEW MOD 45 MIN: CPT | Mod: 25 | Performed by: OTOLARYNGOLOGY

## 2018-10-30 PROCEDURE — 31575 DIAGNOSTIC LARYNGOSCOPY: CPT | Performed by: OTOLARYNGOLOGY

## 2018-10-30 RX ORDER — DEXAMETHASONE 4 MG/1
TABLET ORAL
Qty: 3 INHALER | Refills: 3 | Status: SHIPPED | OUTPATIENT
Start: 2018-10-30 | End: 2020-12-14

## 2018-10-30 ASSESSMENT — PAIN SCALES - GENERAL: PAINLEVEL: MILD PAIN (3)

## 2018-10-30 NOTE — NURSING NOTE
"Initial /64 (BP Location: Right arm, Patient Position: Sitting, Cuff Size: Adult Regular)  Pulse 77  Temp 98.7  F (37.1  C) (Oral)  Ht 1.753 m (5' 9\")  Wt 83 kg (183 lb)  BMI 27.02 kg/m2 Estimated body mass index is 27.02 kg/(m^2) as calculated from the following:    Height as of this encounter: 1.753 m (5' 9\").    Weight as of this encounter: 83 kg (183 lb). .    Anne Guadalupe CMA    "

## 2018-10-30 NOTE — PROGRESS NOTES
This laryngoscopy scope was  used on this patient.    Flexible    Scope Number: Luke Storz Flexible #[id number] Adult

## 2018-10-30 NOTE — MR AVS SNAPSHOT
After Visit Summary   10/30/2018    Daniele Carter    MRN: [id number]           Patient Information     Date Of Birth          1975        Visit Information        Provider Department      10/30/2018 3:30 PM Barbara Eng MD [facility]        Today's Diagnoses     Throat pain    -  1    LPRD (laryngopharyngeal reflux disease)          Care Instructions    Per Physician's instructions            Follow-ups after your visit        Additional Services     GASTROENTEROLOGY ADULT REF CONSULT ONLY       Preferred Location: [facility] [phone]      Please be aware that coverage of these services is subject to the terms and limitations of your health insurance plan.  Call member services at your health plan with any benefit or coverage questions.  Any procedures must be performed at a [place] facility OR coordinated by your clinic's referral office.    Please bring the following with you to your appointment:    (1) Any X-Rays, CTs or MRIs which have been performed.  Contact the facility where they were done to arrange for  prior to your scheduled appointment.    (2) List of current medications   (3) This referral request   (4) Any documents/labs given to you for this referral                  Who to contact     If you have questions or need follow up information about today's clinic visit or your schedule please contact [facility] directly at [phone].  Normal or non-critical lab and imaging results will be communicated to you by MyChart, letter or phone within 4 business days after the clinic has received the results. If you do not hear from us within 7 days, please contact the clinic through MyChart or phone. If you have a critical or abnormal lab result, we will notify you by phone as soon as possible.  Submit refill requests through [employer] or call your pharmacy and they will forward the refill request to us. Please allow 3 business days for your refill to  "be completed.          Additional Information About Your Visit        [employer] Information     [employer] lets you send messages to your doctor, view your test results, renew your prescriptions, schedule appointments and more. To sign up, go to [email or web address] . Click on \"Log in\" on the left side of the screen, which will take you to the Welcome page. Then click on \"Sign up Now\" on the right side of the page.     You will be asked to enter the access code listed below, as well as some personal information. Please follow the directions to create your username and password.     Your access code is: [email or web address]  Expires: 2019  9:07 AM     Your access code will  in 90 days. If you need help or a new code, please call your [place] clinic or [phone].        Care EveryWhere ID     This is your Care EveryWhere ID. This could be used by other organizations to access your [facility] medical records  [id number]        Your Vitals Were     Pulse Temperature Height BMI (Body Mass Index)          77 98.7  F (37.1  C) (Oral) 1.753 m (5' 9\") 27.02 kg/m2         Blood Pressure from Last 3 Encounters:   10/30/18 112/64   10/15/18 124/78   17 130/85    Weight from Last 3 Encounters:   10/30/18 83 kg (183 lb)   10/15/18 83 kg (183 lb)   17 81.6 kg (180 lb)              We Performed the Following     GASTROENTEROLOGY ADULT REF CONSULT ONLY     LARYNGOSCOPY FLEX FIBEROPTIC, DIAGNOSTIC          Today's Medication Changes          These changes are accurate as of 10/30/18  4:06 PM.  If you have any questions, ask your nurse or doctor.               Start taking these medicines.        Dose/Directions    ranitidine 150 MG tablet   Commonly known as:  ZANTAC   Used for:  LPRD (laryngopharyngeal reflux disease)   Started by:  Barbara Eng MD        Dose:  150 mg   Take 1 tablet (150 mg) by mouth 2 times daily   Quantity:  60 tablet   Refills:  1         Stop taking these medicines if you haven't already. " Please contact your care team if you have questions.     predniSONE 20 MG tablet   Commonly known as:  DELTASONE   Stopped by:  Barbara Eng MD                Where to get your medicines      These medications were sent to [facility] [address] [place] [address] [address] [facility]  [address] [facility] [address]     Phone:  [phone]     ranitidine 150 MG tablet                Primary Care Provider Office Phone # Fax #    AleaSona Horn [phone] [address] [facility] [address]        Equal Access to Services     [facility]: Hadii aad ku hadasho Soomaali, waaxda luqadaha, qaybta kaalmada adeegyada, waxay demetrius price . So [facility] [phone].    ATENCIÓN: Si habla español, tiene a hdz disposición servicios gratuitos de asistencia lingüística. [facility] [phone].    We comply with applicable federal civil rights laws and [place] laws. We do not discriminate on the basis of race, color, national origin, age, disability, sex, sexual orientation, or gender identity.            Thank you!     Thank you for choosing [facility]  for your care. Our goal is always to provide you with excellent care. Hearing back from our patients is one way we can continue to improve our services. Please take a few minutes to complete the written survey that you may receive in the mail after your visit with us. Thank you!             Your Updated Medication List - Protect others around you: Learn how to safely use, store and throw away your medicines at www.disposemymeds.org.          This list is accurate as of 10/30/18  4:06 PM.  Always use your most recent med list.                   Brand Name Dispense Instructions for use Diagnosis    albuterol 108 (90 Base) MCG/ACT inhaler    PROAIR HFA/PROVENTIL HFA/VENTOLIN HFA    1 Inhaler    Inhale 2 puffs into the lungs every 6 hours This is your rescue inhaler.    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    Mild persistent asthma without complication       loratadine 10 MG tablet    CLARITIN    30 tablet    Take 1 tablet (10 mg) by mouth daily as needed for allergies    Seasonal allergic rhinitis, unspecified allergic rhinitis trigger       omeprazole 40 MG capsule    priLOSEC    90 capsule    TAKE ONE CAPSULE BY MOUTH ONCE DAILY    Gastroesophageal reflux disease, esophagitis presence not specified       ranitidine 150 MG tablet    ZANTAC    60 tablet    Take 1 tablet (150 mg) by mouth 2 times daily    LPRD (laryngopharyngeal reflux disease)       sucralfate 1 GM tablet    CARAFATE    40 tablet    Take 1 tablet (1 g) by mouth 4 times daily    Throat pain

## 2018-10-30 NOTE — LETTER
10/30/2018         RE: Daniele Carter  [address] [address]        Dear Colleague,    Thank you for referring your patient, Daniele Carter, to the [facility]. Please see a copy of my visit note below.        History of Present Illness - Daniele Carter is a 43 year old male who presents with concerns of feeling like he is choking. He has occassional cough. He describes pain in the back of his mouth for about 1 month. He reports that the symptom is intermittent, but he has greatly cut down his consumption of solid foods and acidic liquids to avoid exacerbating the throat pain. He is a nonsmoker.    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     CARDIOVASCULAR SCREENING; LDL GOAL LESS THAN 160     Mild persistent asthma       Current Medications -   Current Outpatient Prescriptions:      ranitidine (ZANTAC) 150 MG tablet, Take 1 tablet (150 mg) by mouth 2 times daily, Disp: 60 tablet, Rfl: 1     albuterol (PROAIR HFA/PROVENTIL HFA/VENTOLIN HFA) 108 (90 BASE) MCG/ACT Inhaler, Inhale 2 puffs into the lungs every 6 hours This is your rescue inhaler., Disp: 1 Inhaler, Rfl: 4     cyclobenzaprine (FLEXERIL) 10 MG tablet, Take 1 tablet (10 mg) by mouth 3 times daily as needed for muscle spasms (Patient not taking: Reported on 10/30/2018), Disp: 40 tablet, Rfl: 3     EPINEPHrine (EPIPEN 2-RONAN) 0.3 MG/0.3ML injection, Inject 0.3 mLs (0.3 mg) into the muscle once as needed for anaphylaxis Hold on file until needed, Disp: 2 each, Rfl: 3     fluticasone (FLOVENT HFA) 110 MCG/ACT Inhaler, Inhale 2 puffs into the lungs 2 times daily This is your controller inhaler and use daily., Disp: 3 Inhaler, Rfl: 3     loratadine (CLARITIN) 10 MG tablet, Take 1 tablet (10 mg) by mouth daily as needed for allergies, Disp: 30 tablet, Rfl: 5     omeprazole (PRILOSEC) 40 MG capsule, TAKE ONE  "CAPSULE BY MOUTH ONCE DAILY, Disp: 90 capsule, Rfl: 1     sucralfate (CARAFATE) 1 GM tablet, Take 1 tablet (1 g) by mouth 4 times daily (Patient not taking: Reported on 10/30/2018), Disp: 40 tablet, Rfl: 1    Allergies -   Allergies   Allergen Reactions     Asa [Aspirin]      Unknown reaction- Hives???/     Bee Anaphylaxis     Chocolate Flavor Hives       Social History -   Social History     Social History     Marital status: Significant other     Spouse name: N/A     Number of children: 1     Years of education: N/A     Occupational History      [employer] [place]     Social History Main Topics     Smoking status: Never Smoker     Smokeless tobacco: Never Used     Alcohol use Yes      Comment: weekends     Drug use: No     Sexual activity: Yes     Partners: Female     Other Topics Concern     Parent/Sibling W/ Cabg, Mi Or Angioplasty Before 65f 55m? No     Social History Narrative       Family History -   Family History   Problem Relation Age of Onset     Cancer Father      lymphoma     Diabetes Father      type 2     HEART DISEASE Father      Diabetes Maternal Grandfather      Lipids Mother      Alzheimer Disease Mother        Review of Systems - As per HPI and PMHx, otherwise 7 system review of the head and neck negative. 10+ system review negative.    Physical Exam  /64 (BP Location: Right arm, Patient Position: Sitting, Cuff Size: Adult Regular)  Pulse 77  Temp 98.7  F (37.1  C) (Oral)  Ht 1.753 m (5' 9\")  Wt 83 kg (183 lb)  BMI 27.02 kg/m2  General - The patient is well nourished and well developed, and appears to have good nutritional status.  Alert and oriented to person and place, answers questions and cooperates with examination appropriately.   Head and Face - Normocephalic and atraumatic, with no gross asymmetry noted of the contour of the facial features.  The facial nerve is intact, with strong symmetric movements.  Voice and Breathing - The patient was breathing comfortably " without the use of accessory muscles. There was no wheezing, stridor, or stertor.  The patients voice was clear and strong, and had appropriate pitch and quality.  Ears - Bilateral pinna and EACs with normal appearing overlying skin. Tympanic membrane intact with good mobility on pneumatic otoscopy bilaterally. Bony landmarks of the ossicular chain are normal. The tympanic membranes are normal in appearance. No retraction, perforation, or masses.  No fluid or purulence was seen in the external canal or the middle ear.   Eyes - Extraocular movements intact.  Sclera were not icteric or injected, conjunctiva were pink and moist.  Mouth - Examination of the oral cavity showed pink, healthy oral mucosa. No lesions or ulcerations noted.  The tongue was mobile and midline, and the dentition were in good condition.    Throat - The walls of the oropharynx were smooth, pink, moist, symmetric, and had no lesions or ulcerations.  The tonsillar pillars and soft palate were symmetric.  The uvula was midline on elevation.  Neck - Normal midline excursion of the laryngotracheal complex during swallowing.  Full range of motion on passive movement.  Palpation of the occipital, submental, submandibular, internal jugular chain, and supraclavicular nodes did not demonstrate any abnormal lymph nodes or masses.  The carotid pulse was palpable bilaterally.  Palpation of the thyroid was soft and smooth, with no nodules or goiter appreciated.  The trachea was mobile and midline.  Nose - External contour is symmetric, no gross deflection or scars.  Nasal mucosa is pink and moist with no abnormal mucus.  The septum was midline and non-obstructive, turbinates of normal size and position.  No polyps, masses, or purulence noted on examination.  Heart:  Regular rate and rhythm, no murmurs.  Lungs:  Chest clear to auscultation bilaterally    Procedure: Flexible Endoscopy  Indication: throat pain    Attempts at mirror laryngoscopy were not possible  due to gag reflex.  Therefore I proceeded with a fiberoptic examination.  First I sprayed both sides of the nose with a mixture of lidocaine and neosynephrine.  I then passed the scope through the nasal cavity.  The nasal cavity was unremarkable.  The nasopharynx was mucosally covered and symmetric.  The Eustachian tube openings were unobstructed.  Going further down I had a clear view of the base of tongue which had normal appearing lingual tonsillar tissue.  The base of tongue was free of lesions, and the vallecula was open.  The epiglottis was smooth and mucosally covered.  The supraglottic larynx was then clearly visualized.  The vocal cords moved smoothly and symmetrically, they were pearly white and no lesions were seen.  The pyriform sinuses were open, and the limited view of the postcricoid region did not show any lesions.          Assessment - Daniele Carter is a 43 year old male with throat pain but no sign of inflammation or disease in the larynx or oropharynx. He does have known reflux disease. I counseled him to take the PPI 30 minutes before his dinner, and take Zantac before bed. I also advised him to arrange a workup with Gastroenterology to see if he may need to be considered for surgery if he fails medical therapy.       Dr. Barbara Eng MD  Otolaryngology  [facility]        This laryngoscopy scope was  used on this patient.    Flexible    Scope Number: Luke Storz Flexible #[id number] Adult       Again, thank you for allowing me to participate in the care of your patient.        Sincerely,        Barbara Eng MD

## 2018-10-30 NOTE — TELEPHONE ENCOUNTER
"Flovent inhaler  Last Written Prescription Date:  2/17/17  Last Fill Quantity: 3,  # refills: 3   Last office visit: 10/15/2018 with prescribing provider:   For asthma 12/20/17.   Future Office Visit:      ACT Total Scores 5/9/2012 9/3/2013 4/3/2017   ACT TOTAL SCORE 16 20 -   ASTHMA ER VISITS 0 = None 0 = None -   ASTHMA HOSPITALIZATIONS 0 = None 0 = None -   ACT TOTAL SCORE (Goal Greater than or Equal to 20) - - 21   In the past 12 months, how many times did you visit the emergency room for your asthma without being admitted to the hospital? - - 0   In the past 12 months, how many times were you hospitalized overnight because of your asthma? - - 0         Requested Prescriptions   Pending Prescriptions Disp Refills     FLOVENT  MCG/ACT Inhaler [Pharmacy Med Name: FLOVENT HFA 110MCG  AER]  3     Sig: INHALE TWO PUFFS BY MOUTH TWICE DAILY (THIS  IS  YOUR  CONTROLLER  INHALER  AND  USE  DAILY)    Inhaled Steroids Protocol Failed    10/30/2018  4:57 PM       Failed - Asthma control assessment score within normal limits in last 6 months    Please review ACT score.          Passed - Patient is age 12 or older       Passed - Recent (6 mo) or future (30 days) visit within the authorizing provider's specialty    Patient had office visit in the last 6 months or has a visit in the next 30 days with authorizing provider or within the authorizing provider's specialty.  See \"Patient Info\" tab in inbasket, or \"Choose Columns\" in Meds & Orders section of the refill encounter.              "

## 2018-10-30 NOTE — TELEPHONE ENCOUNTER
Prescription approved per [facility] Refill Protocol.  I called pt and reminded that he is due for clinic appt.  Pt agrees.  Dorota Ridley RN

## 2018-10-30 NOTE — PROGRESS NOTES
History of Present Illness - Daniele Carter is a 43 year old male who presents with concerns of feeling like he is choking. He has occassional cough. He describes pain in the back of his mouth for about 1 month. He reports that the symptom is intermittent, but he has greatly cut down his consumption of solid foods and acidic liquids to avoid exacerbating the throat pain. He is a nonsmoker.    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     CARDIOVASCULAR SCREENING; LDL GOAL LESS THAN 160     Mild persistent asthma       Current Medications -   Current Outpatient Prescriptions:      ranitidine (ZANTAC) 150 MG tablet, Take 1 tablet (150 mg) by mouth 2 times daily, Disp: 60 tablet, Rfl: 1     albuterol (PROAIR HFA/PROVENTIL HFA/VENTOLIN HFA) 108 (90 BASE) MCG/ACT Inhaler, Inhale 2 puffs into the lungs every 6 hours This is your rescue inhaler., Disp: 1 Inhaler, Rfl: 4     cyclobenzaprine (FLEXERIL) 10 MG tablet, Take 1 tablet (10 mg) by mouth 3 times daily as needed for muscle spasms (Patient not taking: Reported on 10/30/2018), Disp: 40 tablet, Rfl: 3     EPINEPHrine (EPIPEN 2-RONAN) 0.3 MG/0.3ML injection, Inject 0.3 mLs (0.3 mg) into the muscle once as needed for anaphylaxis Hold on file until needed, Disp: 2 each, Rfl: 3     fluticasone (FLOVENT HFA) 110 MCG/ACT Inhaler, Inhale 2 puffs into the lungs 2 times daily This is your controller inhaler and use daily., Disp: 3 Inhaler, Rfl: 3     loratadine (CLARITIN) 10 MG tablet, Take 1 tablet (10 mg) by mouth daily as needed for allergies, Disp: 30 tablet, Rfl: 5     omeprazole (PRILOSEC) 40 MG capsule, TAKE ONE CAPSULE BY MOUTH ONCE DAILY, Disp: 90 capsule, Rfl: 1     sucralfate (CARAFATE) 1 GM tablet, Take 1 tablet (1 g) by mouth 4 times daily (Patient not taking: Reported on 10/30/2018), Disp: 40 tablet, Rfl: 1    Allergies -   Allergies   Allergen  "Reactions     Asa [Aspirin]      Unknown reaction- Hives???/     Bee Anaphylaxis     Chocolate Flavor Hives       Social History -   Social History     Social History     Marital status: Significant other     Spouse name: N/A     Number of children: 1     Years of education: N/A     Occupational History      [employer]     Social History Main Topics     Smoking status: Never Smoker     Smokeless tobacco: Never Used     Alcohol use Yes      Comment: weekends     Drug use: No     Sexual activity: Yes     Partners: Female     Other Topics Concern     Parent/Sibling W/ Cabg, Mi Or Angioplasty Before 65f 55m? No     Social History Narrative       Family History -   Family History   Problem Relation Age of Onset     Cancer Father      lymphoma     Diabetes Father      type 2     HEART DISEASE Father      Diabetes Maternal Grandfather      Lipids Mother      Alzheimer Disease Mother        Review of Systems - As per HPI and PMHx, otherwise 7 system review of the head and neck negative. 10+ system review negative.    Physical Exam  /64 (BP Location: Right arm, Patient Position: Sitting, Cuff Size: Adult Regular)  Pulse 77  Temp 98.7  F (37.1  C) (Oral)  Ht 1.753 m (5' 9\")  Wt 83 kg (183 lb)  BMI 27.02 kg/m2  General - The patient is well nourished and well developed, and appears to have good nutritional status.  Alert and oriented to person and place, answers questions and cooperates with examination appropriately.   Head and Face - Normocephalic and atraumatic, with no gross asymmetry noted of the contour of the facial features.  The facial nerve is intact, with strong symmetric movements.  Voice and Breathing - The patient was breathing comfortably without the use of accessory muscles. There was no wheezing, stridor, or stertor.  The patients voice was clear and strong, and had appropriate pitch and quality.  Ears - Bilateral pinna and EACs with normal appearing overlying skin. Tympanic " membrane intact with good mobility on pneumatic otoscopy bilaterally. Bony landmarks of the ossicular chain are normal. The tympanic membranes are normal in appearance. No retraction, perforation, or masses.  No fluid or purulence was seen in the external canal or the middle ear.   Eyes - Extraocular movements intact.  Sclera were not icteric or injected, conjunctiva were pink and moist.  Mouth - Examination of the oral cavity showed pink, healthy oral mucosa. No lesions or ulcerations noted.  The tongue was mobile and midline, and the dentition were in good condition.    Throat - The walls of the oropharynx were smooth, pink, moist, symmetric, and had no lesions or ulcerations.  The tonsillar pillars and soft palate were symmetric.  The uvula was midline on elevation.  Neck - Normal midline excursion of the laryngotracheal complex during swallowing.  Full range of motion on passive movement.  Palpation of the occipital, submental, submandibular, internal jugular chain, and supraclavicular nodes did not demonstrate any abnormal lymph nodes or masses.  The carotid pulse was palpable bilaterally.  Palpation of the thyroid was soft and smooth, with no nodules or goiter appreciated.  The trachea was mobile and midline.  Nose - External contour is symmetric, no gross deflection or scars.  Nasal mucosa is pink and moist with no abnormal mucus.  The septum was midline and non-obstructive, turbinates of normal size and position.  No polyps, masses, or purulence noted on examination.  Heart:  Regular rate and rhythm, no murmurs.  Lungs:  Chest clear to auscultation bilaterally    Procedure: Flexible Endoscopy  Indication: throat pain    Attempts at mirror laryngoscopy were not possible due to gag reflex.  Therefore I proceeded with a fiberoptic examination.  First I sprayed both sides of the nose with a mixture of lidocaine and neosynephrine.  I then passed the scope through the nasal cavity.  The nasal cavity was  unremarkable.  The nasopharynx was mucosally covered and symmetric.  The Eustachian tube openings were unobstructed.  Going further down I had a clear view of the base of tongue which had normal appearing lingual tonsillar tissue.  The base of tongue was free of lesions, and the vallecula was open.  The epiglottis was smooth and mucosally covered.  The supraglottic larynx was then clearly visualized.  The vocal cords moved smoothly and symmetrically, they were pearly white and no lesions were seen.  The pyriform sinuses were open, and the limited view of the postcricoid region did not show any lesions.          Assessment - Daniele Carter is a 43 year old male with throat pain but no sign of inflammation or disease in the larynx or oropharynx. He does have known reflux disease. I counseled him to take the PPI 30 minutes before his dinner, and take Zantac before bed. I also advised him to arrange a workup with Gastroenterology to see if he may need to be considered for surgery if he fails medical therapy.       Dr. Barbara Eng MD  Otolaryngology  [facility]

## 2018-12-17 NOTE — ANESTHESIA POSTPROCEDURE EVALUATION
Patient: Daniele Carter    Procedure(s):  Gastroscopy   - Wound Class: II-Clean Contaminated    Diagnosis:LUQ abd pain  Diagnosis Additional Information: No value filed.    Anesthesia Type:  No value filed.    Note:  Anesthesia Post Evaluation    Patient location during evaluation: Bedside  Patient participation: Able to fully participate in evaluation  Level of consciousness: sleepy but conscious  Pain management: adequate  Airway patency: patent  Cardiovascular status: stable  Respiratory status: nasal cannula  Hydration status: stable  PONV: none     Anesthetic complications: None          Last vitals:  Vitals:    03/28/17 1042 03/28/17 1144   BP: 119/82 112/74   Pulse: 79    Resp: 18 16   Temp: 36.5  C (97.7  F)    SpO2: 98% 97%         Electronically Signed By: ISSA Eddy CRNA  March 28, 2017  12:01 PM  
Additional Complaints

## 2018-12-27 DIAGNOSIS — K21.9 GASTROESOPHAGEAL REFLUX DISEASE, ESOPHAGITIS PRESENCE NOT SPECIFIED: ICD-10-CM

## 2018-12-28 RX ORDER — OMEPRAZOLE 40 MG/1
CAPSULE, DELAYED RELEASE ORAL
Qty: 90 CAPSULE | Refills: 2 | Status: SHIPPED | OUTPATIENT
Start: 2018-12-28 | End: 2019-10-05

## 2018-12-28 NOTE — TELEPHONE ENCOUNTER
"Requested Prescriptions   Pending Prescriptions Disp Refills     omeprazole (PRILOSEC) 40 MG DR capsule [Pharmacy Med Name: OMEPRAZOLE DR 40MG  CAP] 90 capsule 1     Sig: TAKE 1 CAPSULE BY MOUTH ONCE DAILY    PPI Protocol Passed - 12/27/2018  2:53 PM       Passed - Not on Clopidogrel (unless Pantoprazole ordered)       Passed - No diagnosis of osteoporosis on record       Passed - Recent (12 mo) or future (30 days) visit within the authorizing provider's specialty    Patient had office visit in the last 12 months or has a visit in the next 30 days with authorizing provider or within the authorizing provider's specialty.  See \"Patient Info\" tab in inbasket, or \"Choose Columns\" in Meds & Orders section of the refill encounter.      Last Written Prescription Date:  6/20/18  Last Fill Quantity: 90,  # refills: 1   Last office visit: 10/15/2018 with prescribing provider:     Future Office Visit:               Passed - Patient is age 18 or older          "

## 2018-12-28 NOTE — TELEPHONE ENCOUNTER
Prescription approved per [facility] Refill Protocol.    Lidia GUERRERO RN

## 2019-01-11 DIAGNOSIS — G89.29 CHRONIC BILATERAL LOW BACK PAIN WITHOUT SCIATICA: ICD-10-CM

## 2019-01-11 DIAGNOSIS — M54.50 CHRONIC BILATERAL LOW BACK PAIN WITHOUT SCIATICA: ICD-10-CM

## 2019-01-11 NOTE — TELEPHONE ENCOUNTER
Routing refill request to provider for review/approval because:  Drug not on the [facility] refill protocol   Requested Prescriptions   Pending Prescriptions Disp Refills     cyclobenzaprine (FLEXERIL) 10 MG tablet [Pharmacy Med Name: CYCLOBENZAPR 10MG   TAB] 40 tablet 3     Sig: TAKE ONE TABLET BY MOUTH THREE TIMES DAILY AS NEEDED FOR MUSCLE SPASM    There is no refill protocol information for this order        Last Written Prescription Date:  2/17/17  Last Fill Quantity: 40,  # refills: 3   Last office visit: 10/15/2018 with prescribing provider:  Dr. Horn   Future Office Visit:      Lidia GUERRERO RN

## 2019-01-14 RX ORDER — CYCLOBENZAPRINE HCL 10 MG
TABLET ORAL
Qty: 40 TABLET | Refills: 3 | OUTPATIENT
Start: 2019-01-14

## 2019-04-02 ENCOUNTER — OFFICE VISIT (OUTPATIENT)
Dept: FAMILY MEDICINE | Facility: CLINIC | Age: 44
End: 2019-04-02
Payer: COMMERCIAL

## 2019-04-02 VITALS
SYSTOLIC BLOOD PRESSURE: 112 MMHG | BODY MASS INDEX: 27.85 KG/M2 | TEMPERATURE: 97.8 F | DIASTOLIC BLOOD PRESSURE: 86 MMHG | HEIGHT: 69 IN | OXYGEN SATURATION: 97 % | HEART RATE: 98 BPM | WEIGHT: 188 LBS | RESPIRATION RATE: 14 BRPM

## 2019-04-02 DIAGNOSIS — J45.30 MILD PERSISTENT ASTHMA WITHOUT COMPLICATION: Primary | ICD-10-CM

## 2019-04-02 DIAGNOSIS — J20.9 ACUTE BRONCHITIS, UNSPECIFIED ORGANISM: ICD-10-CM

## 2019-04-02 PROCEDURE — 99213 OFFICE O/P EST LOW 20 MIN: CPT | Performed by: NURSE PRACTITIONER

## 2019-04-02 RX ORDER — AZITHROMYCIN 250 MG/1
TABLET, FILM COATED ORAL
Qty: 6 TABLET | Refills: 0 | Status: SHIPPED | OUTPATIENT
Start: 2019-04-02 | End: 2019-11-06

## 2019-04-02 RX ORDER — BENZONATATE 200 MG/1
200 CAPSULE ORAL 3 TIMES DAILY PRN
Qty: 30 CAPSULE | Refills: 0 | Status: SHIPPED | OUTPATIENT
Start: 2019-04-02 | End: 2019-11-06

## 2019-04-02 ASSESSMENT — MIFFLIN-ST. JEOR: SCORE: 1748.14

## 2019-04-02 NOTE — PROGRESS NOTES
SUBJECTIVE:   Daniele Carter is a 43 year old male who presents to clinic today for the following health issues:      ENT Symptoms             Symptoms: cc Present Absent Comment   Fever/Chills   x    Fatigue  x     Muscle Aches   x    Eye Irritation   x    Sneezing   x    Nasal Patel/Drg  x     Sinus Pressure/Pain   x    Loss of smell   x    Dental pain   x    Sore Throat   x    Swollen Glands   x    Ear Pain/Fullness   x    Cough  x     Wheeze   x    Chest Pain  x     Shortness of breath  x     Rash   x    Other   x      Symptom duration:  2 weeks   Symptom severity: severe   Treatments tried:  Inhalers, OTC   Contacts:  none   History of asthma. No history of tobacco use or COPD.      -------------------------------------    Problem list and histories reviewed & adjusted, as indicated.  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     CARDIOVASCULAR SCREENING; LDL GOAL LESS THAN 160     Mild persistent asthma     Past Surgical History:   Procedure Laterality Date     COLONOSCOPY  5/6/2011    Procedure:COLONOSCOPY; Surgeon:JONAS JAIN; Location:[facility]     ESOPHAGOSCOPY, GASTROSCOPY, DUODENOSCOPY (EGD), COMBINED N/A 3/28/2017    Procedure: COMBINED ESOPHAGOSCOPY, GASTROSCOPY, DUODENOSCOPY (EGD), BIOPSY SINGLE OR MULTIPLE;  Surgeon: Nikolai Valladares MD;  Location: [facility]       Social History     Tobacco Use     Smoking status: Never Smoker     Smokeless tobacco: Never Used   Substance Use Topics     Alcohol use: Yes     Comment: weekends     Family History   Problem Relation Age of Onset     Cancer Father         lymphoma     Diabetes Father         type 2     Heart Disease Father      Diabetes Maternal Grandfather      Lipids Mother      Alzheimer Disease Mother            Reviewed and updated as needed this visit by clinical staff       Reviewed and updated as needed this visit by  "Provider         ROS:  Constitutional, HEENT, cardiovascular, pulmonary, GI, , musculoskeletal, neuro, skin, endocrine and psych systems are negative, except as otherwise noted.    OBJECTIVE:     /86   Pulse 98   Temp 97.8  F (36.6  C) (Tympanic)   Resp 14   Ht 1.753 m (5' 9\")   Wt 85.3 kg (188 lb)   SpO2 97%   BMI 27.76 kg/m    Body mass index is 27.76 kg/m .  GENERAL: healthy, alert and no distress- congested cough during exam  NECK: no adenopathy, no asymmetry, masses, or scars and thyroid normal to palpation  RESP: lungs clear to auscultation - no rales, rhonchi or wheezes- congested cough during exam  CV: regular rate and rhythm, normal S1 S2, no S3 or S4, no murmur, click or rub, no peripheral edema and peripheral pulses strong  MS: no gross musculoskeletal defects noted, no edema    Diagnostic Test Results:  none     ASSESSMENT/PLAN:       1. Acute bronchitis, unspecified organism  - start using Albuterol prn wheezing   - azithromycin (ZITHROMAX) 250 MG tablet; Two tablets first day, then one tablet daily for four days.  Dispense: 6 tablet; Refill: 0  - benzonatate (TESSALON) 200 MG capsule; Take 1 capsule (200 mg) by mouth 3 times daily as needed for cough  Dispense: 30 capsule; Refill: 0    2. Mild persistent asthma without complication  Stable with current therapy       ISSA Toth [facility]  "

## 2019-04-02 NOTE — PATIENT INSTRUCTIONS
Thank you for choosing [facility].  You may be receiving an email and/or telephone survey request from [facility] Customer Experience regarding your visit today.  Please take a few minutes to respond to the survey to let us know how we are doing.      If you have questions or concerns, please contact us via [employer] or you can contact your care team at [phone].    Our Clinic hours are:  Monday 6:40 am  to 7:00 pm  Tuesday -Friday 6:40 am to 5:00 pm    The [place] outpatient lab hours are:  Monday - Friday 6:10 am to 4:45 pm  Saturdays 7:00 am to 11:00 am  Appointments are required, call [phone]    If you have clinical questions after hours or would like to schedule an appointment,  call the clinic at [phone].

## 2019-04-02 NOTE — NURSING NOTE
"Initial /86   Pulse 98   Temp 97.8  F (36.6  C) (Tympanic)   Resp 14   Ht 1.753 m (5' 9\")   Wt 85.3 kg (188 lb)   SpO2 97%   BMI 27.76 kg/m   Estimated body mass index is 27.76 kg/m  as calculated from the following:    Height as of this encounter: 1.753 m (5' 9\").    Weight as of this encounter: 85.3 kg (188 lb). .    Izabela Pennington    "

## 2019-06-24 ENCOUNTER — TELEPHONE (OUTPATIENT)
Dept: FAMILY MEDICINE | Facility: CLINIC | Age: 44
End: 2019-06-24

## 2019-06-24 NOTE — TELEPHONE ENCOUNTER
Panel Management Review      Patient has the following on his problem list:     Asthma review     ACT Total Scores 4/3/2017   ACT TOTAL SCORE -   ASTHMA ER VISITS -   ASTHMA HOSPITALIZATIONS -   ACT TOTAL SCORE (Goal Greater than or Equal to 20) 21   In the past 12 months, how many times did you visit the emergency room for your asthma without being admitted to the hospital? 0   In the past 12 months, how many times were you hospitalized overnight because of your asthma? 0      1. Is Asthma diagnosis on the Problem List? Yes    2. Is Asthma listed on Health Maintenance? Yes    3. Patient is due for:  ACT      Composite cancer screening  Chart review shows that this patient is due/due soon for the following None  Summary:    Patient is due/failing the following:   ACT    Action needed:   Patient needs to do ACT.    Type of outreach:    Letter mailed with ACT for the patient to complete and return.    Questions for provider review:    None                                                                                                                                    ARI Sharif MA

## 2019-06-24 NOTE — LETTER
June 24, 2019      Daniele Carter  [address] [address]        Dear Daniele,     Your [facility] Team works hard to make sure that you and your family receive exceptional care. Enclosed you will find a copy of the Asthma Control Test (ACT) that our clinic uses to monitor and manage your asthma. This test is an assessment tool that we use to determine how well your asthma is controlled.  Please complete the enclosed form and return in the provided envelope.  Thank you for trusting us with your health care.    Sincerely,        Your [facility] Team/rosendo

## 2019-10-01 ENCOUNTER — TELEPHONE (OUTPATIENT)
Dept: FAMILY MEDICINE | Facility: CLINIC | Age: 44
End: 2019-10-01

## 2019-10-01 NOTE — LETTER
October 1, 2019      Daniele Carter  [address] [address]        Dear Daniele,     Your [facility] Team works hard to make sure that you and your family receive exceptional care. Enclosed you will find a copy of the Asthma Control Test (ACT) that our clinic uses to monitor and manage your asthma. This test is an assessment tool that we use to determine how well your asthma is controlled.  Please complete the enclosed form and return in the provided envelope.  Thank you for trusting us with your health care.    Sincerely,        Your [facility] Team/rosendo

## 2019-10-05 DIAGNOSIS — K21.9 GASTROESOPHAGEAL REFLUX DISEASE, ESOPHAGITIS PRESENCE NOT SPECIFIED: ICD-10-CM

## 2019-10-07 NOTE — TELEPHONE ENCOUNTER
"Requested Prescriptions   Pending Prescriptions Disp Refills     omeprazole (PRILOSEC) 40 MG DR capsule [Pharmacy Med Name: OMEPRAZOLE DR 40MG  CAP] 90 capsule 2     Sig: TAKE 1 CAPSULE BY MOUTH ONCE DAILY   Last Written Prescription Date:  12/28/18  Last Fill Quantity: 90 cap,  # refills: 2   Last office visit: 4/2/2019 with prescribing provider:  TRAN Holden   Future Office Visit:        PPI Protocol Passed - 10/5/2019 10:28 AM        Passed - Not on Clopidogrel (unless Pantoprazole ordered)        Passed - No diagnosis of osteoporosis on record        Passed - Recent (12 mo) or future (30 days) visit within the authorizing provider's specialty     Patient has had an office visit with the authorizing provider or a provider within the authorizing providers department within the previous 12 mos or has a future within next 30 days. See \"Patient Info\" tab in inbasket, or \"Choose Columns\" in Meds & Orders section of the refill encounter.              Passed - Medication is active on med list        Passed - Patient is age 18 or older          "

## 2019-10-08 RX ORDER — OMEPRAZOLE 40 MG/1
CAPSULE, DELAYED RELEASE ORAL
Qty: 90 CAPSULE | Refills: 1 | Status: SHIPPED | OUTPATIENT
Start: 2019-10-08 | End: 2020-03-23

## 2019-11-06 ENCOUNTER — OFFICE VISIT (OUTPATIENT)
Dept: FAMILY MEDICINE | Facility: CLINIC | Age: 44
End: 2019-11-06
Payer: COMMERCIAL

## 2019-11-06 VITALS
OXYGEN SATURATION: 100 % | HEART RATE: 97 BPM | WEIGHT: 184 LBS | HEIGHT: 69 IN | SYSTOLIC BLOOD PRESSURE: 130 MMHG | RESPIRATION RATE: 16 BRPM | TEMPERATURE: 97.7 F | DIASTOLIC BLOOD PRESSURE: 80 MMHG | BODY MASS INDEX: 27.25 KG/M2

## 2019-11-06 DIAGNOSIS — M54.50 CHRONIC BILATERAL LOW BACK PAIN WITHOUT SCIATICA: ICD-10-CM

## 2019-11-06 DIAGNOSIS — G89.29 CHRONIC BILATERAL LOW BACK PAIN WITHOUT SCIATICA: ICD-10-CM

## 2019-11-06 DIAGNOSIS — L30.9 CHRONIC DERMATITIS: ICD-10-CM

## 2019-11-06 DIAGNOSIS — Z91.030 ALLERGIC TO BEES: ICD-10-CM

## 2019-11-06 DIAGNOSIS — Z88.9 MULTIPLE ALLERGIES: Primary | ICD-10-CM

## 2019-11-06 PROCEDURE — 99214 OFFICE O/P EST MOD 30 MIN: CPT | Performed by: NURSE PRACTITIONER

## 2019-11-06 RX ORDER — EPINEPHRINE 0.3 MG/.3ML
0.3 INJECTION SUBCUTANEOUS PRN
Qty: 1 EACH | Refills: 3 | Status: SHIPPED | OUTPATIENT
Start: 2019-11-06 | End: 2021-08-31

## 2019-11-06 RX ORDER — CYCLOBENZAPRINE HCL 10 MG
10 TABLET ORAL 3 TIMES DAILY PRN
Qty: 40 TABLET | Refills: 3 | Status: SHIPPED | OUTPATIENT
Start: 2019-11-06 | End: 2020-12-14

## 2019-11-06 RX ORDER — TRIAMCINOLONE ACETONIDE 1 MG/G
CREAM TOPICAL 2 TIMES DAILY
Qty: 60 G | Refills: 1 | Status: ON HOLD | OUTPATIENT
Start: 2019-11-06 | End: 2021-09-15

## 2019-11-06 ASSESSMENT — MIFFLIN-ST. JEOR: SCORE: 1725

## 2019-11-06 NOTE — PATIENT INSTRUCTIONS
This is not shingles     Suspect more your chronic dermatitis related to allergies     Recommend starting with an allergist     May consider going to dermatologist     Refilled flexeril and epi pen

## 2019-11-06 NOTE — PROGRESS NOTES
"Subjective     Daniele Carter is a 44 year old male who presents to clinic today for the following health issues:    HPI   Rash  Onset: 4/19  Worse x 2 days     Description:   Location: all over his body   Character: linear, red  Itching (Pruritis): YES    Progression of Symptoms:  worsening    Accompanying Signs & Symptoms:  Fever: no   Body aches or joint pain: no   Sore throat symptoms: no   Recent cold symptoms: no     History:   Previous similar rash: YES    Precipitating factors:   Exposure to similar rash: YES  New exposures soaps  And food   Recent travel: no     Alleviating factors:  none    Therapies Tried and outcome: none      Very worried that he has shingles as his brother had them and was visiting   But he has a long standing history of  his skin being very sensitive   Does break out from eating chocolate   Reports having chocolate over halloween and then a hot chocolate   Reports that he has seen dermatology years ago but no one seems to \"know what to do with this\"    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     CARDIOVASCULAR SCREENING; LDL GOAL LESS THAN 160     Mild persistent asthma     Past Surgical History:   Procedure Laterality Date     COLONOSCOPY  5/6/2011    Procedure:COLONOSCOPY; Surgeon:JONAS JAIN; Location:[facility]     ESOPHAGOSCOPY, GASTROSCOPY, DUODENOSCOPY (EGD), COMBINED N/A 3/28/2017    Procedure: COMBINED ESOPHAGOSCOPY, GASTROSCOPY, DUODENOSCOPY (EGD), BIOPSY SINGLE OR MULTIPLE;  Surgeon: Nikolai Valladares MD;  Location: [facility]       Social History     Tobacco Use     Smoking status: Never Smoker     Smokeless tobacco: Never Used   Substance Use Topics     Alcohol use: Yes     Comment: weekends     Family History   Problem Relation Age of Onset     Cancer Father         lymphoma     Diabetes Father         type 2     Heart Disease Father      Diabetes Maternal Grandfather      Lipids " "Mother      Alzheimer Disease Mother            Reviewed and updated as needed this visit by Provider         Review of Systems   ROS COMP: Constitutional, HEENT, cardiovascular, pulmonary, gi and gu systems are negative, except as otherwise noted.      Objective    /80   Pulse 97   Temp 97.7  F (36.5  C)   Resp 16   Ht 1.753 m (5' 9\")   Wt 83.5 kg (184 lb)   SpO2 100%   BMI 27.17 kg/m    Body mass index is 27.17 kg/m .  Physical Exam   GENERAL: healthy, alert and no distress  NECK: no adenopathy, no asymmetry, masses, or scars and thyroid normal to palpation  RESP: lungs clear to auscultation - no rales, rhonchi or wheezes  CV: regular rate and rhythm, normal S1 S2, no S3 or S4, no murmur, click or rub, no peripheral edema and peripheral pulses strong  ABDOMEN: soft, nontender, no hepatosplenomegaly, no masses and bowel sounds normal  MS: no gross musculoskeletal defects noted, no edema  SKIN: multiple discrete scabbed areas on arms back and torso                   Diagnostic Test Results:  Labs reviewed in Epic  none         Assessment & Plan     1. Multiple allergies  Recommend that he see allergist for further testing   - ALLERGY/ASTHMA ADULT REFERRAL    2. Chronic dermatitis  Reassurance provided that this is not shingles   Suspect this  Current rash is due to his chronic dermatitis   Kenalog cram prescribed discussed restarting anti histamine   Follow up with allergist   - triamcinolone (KENALOG) 0.1 % external cream; Apply topically 2 times daily  Dispense: 60 g; Refill: 1    3. Chronic bilateral low back pain without sciatica  Patient request refill on flexeril   Reports back is currently not bothering him but likes to have this on hand   Refilled   - cyclobenzaprine (FLEXERIL) 10 MG tablet; Take 1 tablet (10 mg) by mouth 3 times daily as needed for muscle spasms  Dispense: 40 tablet; Refill: 3    4. Allergic to bees  - EPINEPHrine (EPIPEN/ADRENACLICK/OR ANY BX GENERIC EQUIV) 0.3 MG/0.3ML " injection 2-pack; Inject 0.3 mLs (0.3 mg) into the muscle as needed for anaphylaxis  Dispense: 1 each; Refill: 3         Patient Instructions   This is not shingles     Suspect more your chronic dermatitis related to allergies     Recommend starting with an allergist     May consider going to dermatologist     Refilled flexeril and epi pen       Return in about 2 weeks (around 11/20/2019), or if symptoms worsen or fail to improve.    Kezia Mark NP  [facility]

## 2019-11-07 ASSESSMENT — ASTHMA QUESTIONNAIRES: ACT_TOTALSCORE: 18

## 2020-01-20 ENCOUNTER — TELEPHONE (OUTPATIENT)
Dept: FAMILY MEDICINE | Facility: CLINIC | Age: 45
End: 2020-01-20

## 2020-01-20 NOTE — TELEPHONE ENCOUNTER
Panel Management Review      Patient has the following on his problem list:     Asthma review     ACT Total Scores 11/6/2019   ACT TOTAL SCORE -   ASTHMA ER VISITS -   ASTHMA HOSPITALIZATIONS -   ACT TOTAL SCORE (Goal Greater than or Equal to 20) 18   In the past 12 months, how many times did you visit the emergency room for your asthma without being admitted to the hospital? 0   In the past 12 months, how many times were you hospitalized overnight because of your asthma? 0      1. Is Asthma diagnosis on the Problem List? Yes    2. Is Asthma listed on Health Maintenance? Yes    3. Patient is due for:  ACT      Composite cancer screening  Chart review shows that this patient is due/due soon for the following None  Summary:    Patient is due/failing the following:   ACT    Action needed:   Patient needs to do ACT.    Type of outreach:    Sent letter. and Copy of ACT mailed to patient, will reach out in 5 days.    Questions for provider review:    None                                                                                                                                    MM     Chart routed to Care Team .

## 2020-03-21 ENCOUNTER — NURSE TRIAGE (OUTPATIENT)
Dept: NURSING | Facility: CLINIC | Age: 45
End: 2020-03-21

## 2020-03-21 NOTE — TELEPHONE ENCOUNTER
"States he had GERD and the past 2 days he has had chest discomfort constantly and more severe than usual. States it still feels like his usual reflux, sharp, just constant and more severe (5 out of 10). Hx asthma and gets SOB w/ this - inhaler relieves SOB. Has not consumed any trigger foods or drinks. Advised ED now.     Reason for Disposition    [1] Intermittent  chest pain or \"angina\" AND [2] increasing in severity or frequency  (Exception: pains lasting a few seconds)    Additional Information    Negative: Severe difficulty breathing (e.g., struggling for each breath, speaks in single words)    Negative: Difficult to awaken or acting confused (e.g., disoriented, slurred speech)    Negative: Shock suspected (e.g., cold/pale/clammy skin, too weak to stand, low BP, rapid pulse)    Negative: [1] Chest pain lasts > 5 minutes AND [2] history of heart disease  (i.e., heart attack, bypass surgery, angina, angioplasty, CHF; not just a heart murmur)    Negative: [1] Chest pain lasts > 5 minutes AND [2] described as crushing, pressure-like, or heavy    Negative: [1] Chest pain lasts > 5 minutes AND [2] age > 50    Negative: [1] Chest pain lasts > 5 minutes AND [2] age > 30 AND [3] at least one cardiac risk factor (i.e., hypertension, diabetes, obesity, smoker or strong family history of heart disease)    Negative: [1] Chest pain lasts > 5 minutes AND [2] not relieved with nitroglycerin    Negative: Passed out (i.e., lost consciousness, collapsed and was not responding)    Negative: Heart beating < 50 beats per minute OR > 140 beats per minute    Negative: Visible sweat on face or sweat dripping down face    Negative: Sounds like a life-threatening emergency to the triager    Negative: Followed a chest injury    Negative: SEVERE chest pain    Protocols used: CHEST PAIN-A-AH      "

## 2020-03-23 DIAGNOSIS — K21.9 GASTROESOPHAGEAL REFLUX DISEASE, ESOPHAGITIS PRESENCE NOT SPECIFIED: ICD-10-CM

## 2020-03-23 RX ORDER — OMEPRAZOLE 40 MG/1
CAPSULE, DELAYED RELEASE ORAL
Qty: 90 CAPSULE | Refills: 0 | Status: SHIPPED | OUTPATIENT
Start: 2020-03-23 | End: 2020-06-04

## 2020-03-23 NOTE — TELEPHONE ENCOUNTER
Patient only has 2 or 3 tablets left, would like refill as soon as possible. Ailyn Santos on 3/23/2020 at 8:35 AM

## 2020-03-25 ENCOUNTER — HOSPITAL ENCOUNTER (EMERGENCY)
Facility: CLINIC | Age: 45
Discharge: HOME | End: 2020-03-25
Attending: FAMILY MEDICINE | Admitting: FAMILY MEDICINE
Payer: COMMERCIAL

## 2020-03-25 ENCOUNTER — TELEPHONE (OUTPATIENT)
Dept: FAMILY MEDICINE | Facility: CLINIC | Age: 45
End: 2020-03-25

## 2020-03-25 ENCOUNTER — APPOINTMENT (OUTPATIENT)
Dept: GENERAL RADIOLOGY | Facility: CLINIC | Age: 45
End: 2020-03-25
Attending: FAMILY MEDICINE
Payer: COMMERCIAL

## 2020-03-25 VITALS
DIASTOLIC BLOOD PRESSURE: 84 MMHG | RESPIRATION RATE: 16 BRPM | BODY MASS INDEX: 27.32 KG/M2 | TEMPERATURE: 98.1 F | HEART RATE: 81 BPM | SYSTOLIC BLOOD PRESSURE: 120 MMHG | OXYGEN SATURATION: 98 % | WEIGHT: 185 LBS

## 2020-03-25 DIAGNOSIS — K21.00 GASTROESOPHAGEAL REFLUX DISEASE WITH ESOPHAGITIS: ICD-10-CM

## 2020-03-25 LAB
ALBUMIN SERPL-MCNC: 4 G/DL (ref 3.4–5)
ALP SERPL-CCNC: 86 U/L (ref 40–150)
ALT SERPL W P-5'-P-CCNC: 49 U/L (ref 0–70)
ANION GAP SERPL CALCULATED.3IONS-SCNC: 5 MMOL/L (ref 3–14)
AST SERPL W P-5'-P-CCNC: 23 U/L (ref 0–45)
BASOPHILS # BLD AUTO: 0 10E9/L (ref 0–0.2)
BASOPHILS NFR BLD AUTO: 0.4 %
BILIRUB SERPL-MCNC: 0.7 MG/DL (ref 0.2–1.3)
BUN SERPL-MCNC: 13 MG/DL (ref 7–30)
CALCIUM SERPL-MCNC: 9 MG/DL (ref 8.5–10.1)
CHLORIDE SERPL-SCNC: 110 MMOL/L (ref 94–109)
CO2 SERPL-SCNC: 27 MMOL/L (ref 20–32)
CREAT SERPL-MCNC: 0.65 MG/DL (ref 0.66–1.25)
DIFFERENTIAL METHOD BLD: NORMAL
EOSINOPHIL # BLD AUTO: 0.1 10E9/L (ref 0–0.7)
EOSINOPHIL NFR BLD AUTO: 1.9 %
ERYTHROCYTE [DISTWIDTH] IN BLOOD BY AUTOMATED COUNT: 12.6 % (ref 10–15)
GFR SERPL CREATININE-BSD FRML MDRD: >90 ML/MIN/{1.73_M2}
GLUCOSE SERPL-MCNC: 71 MG/DL (ref 70–99)
HCT VFR BLD AUTO: 48.3 % (ref 40–53)
HGB BLD-MCNC: 15.8 G/DL (ref 13.3–17.7)
IMM GRANULOCYTES # BLD: 0 10E9/L (ref 0–0.4)
IMM GRANULOCYTES NFR BLD: 0.3 %
LIPASE SERPL-CCNC: 184 U/L (ref 73–393)
LYMPHOCYTES # BLD AUTO: 2.1 10E9/L (ref 0.8–5.3)
LYMPHOCYTES NFR BLD AUTO: 27.7 %
MCH RBC QN AUTO: 27.2 PG (ref 26.5–33)
MCHC RBC AUTO-ENTMCNC: 32.7 G/DL (ref 31.5–36.5)
MCV RBC AUTO: 83 FL (ref 78–100)
MONOCYTES # BLD AUTO: 0.7 10E9/L (ref 0–1.3)
MONOCYTES NFR BLD AUTO: 9.4 %
NEUTROPHILS # BLD AUTO: 4.5 10E9/L (ref 1.6–8.3)
NEUTROPHILS NFR BLD AUTO: 60.3 %
NRBC # BLD AUTO: 0 10*3/UL
NRBC BLD AUTO-RTO: 0 /100
PLATELET # BLD AUTO: 371 10E9/L (ref 150–450)
POTASSIUM SERPL-SCNC: 3.8 MMOL/L (ref 3.4–5.3)
PROT SERPL-MCNC: 8 G/DL (ref 6.8–8.8)
RBC # BLD AUTO: 5.8 10E12/L (ref 4.4–5.9)
SODIUM SERPL-SCNC: 142 MMOL/L (ref 133–144)
TROPONIN I SERPL-MCNC: <0.015 UG/L (ref 0–0.04)
WBC # BLD AUTO: 7.5 10E9/L (ref 4–11)

## 2020-03-25 PROCEDURE — 93010 ELECTROCARDIOGRAM REPORT: CPT | Mod: Z6 | Performed by: FAMILY MEDICINE

## 2020-03-25 PROCEDURE — 84484 ASSAY OF TROPONIN QUANT: CPT | Performed by: FAMILY MEDICINE

## 2020-03-25 PROCEDURE — 93005 ELECTROCARDIOGRAM TRACING: CPT | Performed by: FAMILY MEDICINE

## 2020-03-25 PROCEDURE — 83690 ASSAY OF LIPASE: CPT | Performed by: FAMILY MEDICINE

## 2020-03-25 PROCEDURE — 99285 EMERGENCY DEPT VISIT HI MDM: CPT | Mod: 25 | Performed by: FAMILY MEDICINE

## 2020-03-25 PROCEDURE — 71046 X-RAY EXAM CHEST 2 VIEWS: CPT

## 2020-03-25 PROCEDURE — 85025 COMPLETE CBC W/AUTO DIFF WBC: CPT | Performed by: FAMILY MEDICINE

## 2020-03-25 PROCEDURE — 80053 COMPREHEN METABOLIC PANEL: CPT | Performed by: FAMILY MEDICINE

## 2020-03-25 NOTE — ED AVS SNAPSHOT
[facility] Emergency Department  [address] [address]  Phone:  [phone]  Fax:  [phone]                                    Daniele Carter   MRN: [id number]    Department:  [facility] Emergency Department   Date of Visit:  3/25/2020           After Visit Summary Signature Page    I have received my discharge instructions, and my questions have been answered. I have discussed any challenges I see with this plan with the nurse or doctor.    ..........................................................................................................................................  Patient/Patient Representative Signature      ..........................................................................................................................................  Patient Representative Print Name and Relationship to Patient    ..................................................               ................................................  Date                                   Time    ..........................................................................................................................................  Reviewed by Signature/Title    ...................................................              ..............................................  Date                                               Time          22EPIC Rev 08/18

## 2020-03-25 NOTE — LETTER
March 25, 2020      To Whom It May Concern:      Daniele Carter was seen in our Emergency Department today, 03/25/20.  Please excuse him from work tonight.  He may return to work unrestricted tomorrow.    Sincerely,        Erik Sevlila MD

## 2020-03-25 NOTE — TELEPHONE ENCOUNTER
Reason for call:    Symptom or request:     Patient called stating that he has a temp of 98.2; chest pain however improving. See telephone note from 03/21/2020--did not have a ride that's why no visit. Concerned its his reflux.       Best Time:  any    Can we leave a detailed message on this number?  YES     Linh CHAPMAN  Station

## 2020-03-25 NOTE — TELEPHONE ENCOUNTER
"Started Friday,   \"I have acid reflux and asthma, and the week before I had all the things I shouldn't eat and drink\"   \"kind of like a sore throat, started at 1 am, I get it once in a while with my acid reflux, and I had a pain in the lower left side, and the chest pain is going down again now, but , well, I don't know. . \"    \"I work nights as a ubaldo at [employer]\"     Took his omeprazole this morning,   Took benadryl also for a runny nose. \"I thought it was allergies\"     As we talked, I reviewed the notes from nurse advisors previously that recommended ED  I asked if he had gone in for eval \"umm, no, well, I guess I am starting to think I should go in now. \"    Advised ED immed. And if chest pain recurs, short of air, any radiating pain to call 911. \"oh, yah, ok, \"    Pamela Merino RNC      "

## 2020-03-25 NOTE — ED PROVIDER NOTES
History     Chief Complaint   Patient presents with     Pharyngitis     sore throat since this.     Chest Pain     chest pain since friday no SOA no cough pain is steady     HPI  Daniele Carter is a 44 year old male who presents with chest pain and sore throat.  He attributes the symptoms to his reflux which is severe and chronic.  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  His symptoms began 5 days ago with burning in his chest.  He has burning in the retrosternal area that is constant and both sharp and pressure-like.  Its not aggravated by anything except worrying.  It is not aggravated by physical exertion.  He does not have a cough or a fever.  He does not feel short of breath.  He admits that he did some binge drinking in the week before this started but also that he drinks a lot of coffee.  He has had no alcohol for at least the past 10 days but he continues to drink a lot of coffee because he works a night shift.  He is also been eating spicy foods.  He takes omeprazole and took an extra dose over the weekend.  It does seem to help but the relief is not sustained.  He has not taken anything else for it.  Does have a history of asthma but is not feeling short of breath or wheezing and is not coughing as noted.  He also has a sore throat which also began yesterday that he again attributes to reflux and says it is typical of his usual sore throat with reflux.  He has some symptoms of left upper quadrant abdominal pain that he also attributes to his reflux and he has had in the past.  He has no flank pain no radiation to the groin no history of kidney stones no dysuria no hematuria no nausea no vomiting.  He does not smoke.    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 03/22/2010     Priority: Medium     Left-sided chest wall pain 09/14/2009     Priority: Medium     Acid reflux 09/14/2009     Priority: Medium        Past Medical History:    Past Medical History:   Diagnosis Date     Esophageal reflux      Hyperlipidemia      Pain in joint, lower leg        Past Surgical History:    Past Surgical History:   Procedure Laterality Date     COLONOSCOPY  5/6/2011    Procedure:COLONOSCOPY; Surgeon:JONAS JAIN; Location:[facility]     ESOPHAGOSCOPY, GASTROSCOPY, DUODENOSCOPY (EGD), COMBINED N/A 3/28/2017    Procedure: COMBINED ESOPHAGOSCOPY, GASTROSCOPY, DUODENOSCOPY (EGD), BIOPSY SINGLE OR MULTIPLE;  Surgeon: Nikolai Valladares MD;  Location: [facility]       Family History:    Family History   Problem Relation Age of Onset     Cancer Father         lymphoma     Diabetes Father         type 2     Heart Disease Father      Diabetes Maternal Grandfather      Lipids Mother      Alzheimer Disease Mother        Social History:  Marital Status:  Significant other [7]  Social History     Tobacco Use     Smoking status: Never Smoker     Smokeless tobacco: Never Used   Substance Use Topics     Alcohol use: Yes     Comment: weekends     Drug use: No        Medications:    omeprazole (PRILOSEC) 40 MG DR capsule  albuterol (PROAIR HFA/PROVENTIL HFA/VENTOLIN HFA) 108 (90 BASE) MCG/ACT Inhaler  cyclobenzaprine (FLEXERIL) 10 MG tablet  EPINEPHrine (EPIPEN/ADRENACLICK/OR ANY BX GENERIC EQUIV) 0.3 MG/0.3ML injection 2-pack  FLOVENT  MCG/ACT Inhaler  loratadine (CLARITIN) 10 MG tablet  triamcinolone (KENALOG) 0.1 % external cream      Review of Systems    All other systems are reviewed and are negative    Physical Exam   BP: (!) 141/88  Pulse: 84  Heart Rate: 95  Temp: 98.1  F (36.7  C)  Resp:  16  Weight: 83.9 kg (185 lb)  SpO2: 98 %      Physical Exam    Nursing note and vitals were reviewed.  Constitutional: Awake and alert, adequately nourished and developed appearing 44-year-old in no apparent discomfort, who does not appear acutely ill, and who answers questions appropriately and cooperates with examination.  HEENT: EOMI.   Neck: Freely mobile.  Cardiovascular: Cardiac examination reveals normal heart rate and regular rhythm without murmur.  Pulmonary/Chest: Breathing is unlabored.  Breath sounds are clear and equal bilaterally.  There no retractions, tachypnea, rales, wheezes, or rhonchi.  Abdomen: Soft, nontender, no HSM or masses rebound or guarding.  Musculoskeletal: Extremities are warm and well-perfused and without edema  Neurological: Alert, oriented, thought content logical, coherent   Skin: Warm, dry, no rashes.  Psychiatric: Affect broad and appropriate.      ED Course        Procedures               EKG Interpretation:      Interpreted by Erik Sevilla MD  Time reviewed: 13:57  Symptoms at time of EKG: chest pain   Rhythm: sinus   Rate: 102  Axis: normal  Ectopy: none  Conduction: normal  ST Segments/ T Waves: No ST-T wave changes  Q Waves: none  Comparison to prior: Unchanged from the EKG of September 14, 2009    Clinical Impression: normal EKG          Critical Care time:  none               Results for orders placed or performed during the hospital encounter of 03/25/20 (from the past 24 hour(s))   CBC with platelets differential   Result Value Ref Range    WBC 7.5 4.0 - 11.0 10e9/L    RBC Count 5.80 4.4 - 5.9 10e12/L    Hemoglobin 15.8 13.3 - 17.7 g/dL    Hematocrit 48.3 40.0 - 53.0 %    MCV 83 78 - 100 fl    MCH 27.2 26.5 - 33.0 pg    MCHC 32.7 31.5 - 36.5 g/dL    RDW 12.6 10.0 - 15.0 %    Platelet Count 371 150 - 450 10e9/L    Diff Method Automated Method     % Neutrophils 60.3 %    % Lymphocytes 27.7 %    % Monocytes 9.4 %    % Eosinophils 1.9 %    % Basophils 0.4 %    % Immature  Granulocytes 0.3 %    Nucleated RBCs 0 0 /100    Absolute Neutrophil 4.5 1.6 - 8.3 10e9/L    Absolute Lymphocytes 2.1 0.8 - 5.3 10e9/L    Absolute Monocytes 0.7 0.0 - 1.3 10e9/L    Absolute Eosinophils 0.1 0.0 - 0.7 10e9/L    Absolute Basophils 0.0 0.0 - 0.2 10e9/L    Abs Immature Granulocytes 0.0 0 - 0.4 10e9/L    Absolute Nucleated RBC 0.0    Comprehensive metabolic panel   Result Value Ref Range    Sodium 142 133 - 144 mmol/L    Potassium 3.8 3.4 - 5.3 mmol/L    Chloride 110 (H) 94 - 109 mmol/L    Carbon Dioxide 27 20 - 32 mmol/L    Anion Gap 5 3 - 14 mmol/L    Glucose 71 70 - 99 mg/dL    Urea Nitrogen 13 7 - 30 mg/dL    Creatinine 0.65 (L) 0.66 - 1.25 mg/dL    GFR Estimate >90 >60 mL/min/[1.73_m2]    GFR Estimate If Black >90 >60 mL/min/[1.73_m2]    Calcium 9.0 8.5 - 10.1 mg/dL    Bilirubin Total 0.7 0.2 - 1.3 mg/dL    Albumin 4.0 3.4 - 5.0 g/dL    Protein Total 8.0 6.8 - 8.8 g/dL    Alkaline Phosphatase 86 40 - 150 U/L    ALT 49 0 - 70 U/L    AST 23 0 - 45 U/L   Lipase   Result Value Ref Range    Lipase 184 73 - 393 U/L   Troponin I   Result Value Ref Range    Troponin I ES <0.015 0.000 - 0.045 ug/L   XR Chest 2 Views    Narrative    CHEST TWO VIEWS March 25, 2020 2:22 PM     HISTORY: Chest pain.    COMPARISON: None.       Impression    IMPRESSION: There are no acute infiltrates. The cardiac silhouette is  not enlarged. Pulmonary vasculature is unremarkable.     NIKKI DAS MD       Medications - No data to display    Assessments & Plan (with Medical Decision Making)     44-year-old male presented with chest pain and sore throat as described above.  Differential diagnosis includes ACS, angina, PE, pneumothorax, reflux, esophagitis, chest wall pain, other GI causes including hepatitis and pancreatitis.  Physical examination is reassuring and normal.  Operatory evaluation is similarly reassuring with a unmeasurable troponin, normal chest x-ray, normal CBC, normal chemistries, excluding most of the causes in the  differential diagnosis.  Symptoms are most likely due to his reflux with probably development of some esophagitis due to dietary indiscretion.  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  He will be off work tonight because he has not slept in the last 24 hours but he may return to unrestricted work after this.  I have no suspicion at this time for coronavirus infection which is reason his employer asked him to come to the emergency department.  He does not have symptoms of this with no cough, fever, dyspnea.  Advised him if he does develop those symptoms, he should call phone care or go to [facility] be evaluated.  He is comfortable with this plan and his questions were all answered.    I have reviewed the nursing notes.    I have reviewed the findings, diagnosis, plan and need for follow up with the patient.       Discharge Medication List as of 3/25/2020  3:28 PM          Final diagnoses:   Gastroesophageal reflux disease with esophagitis       3/25/2020   [facility] EMERGENCY DEPARTMENT     Erik Sevilla MD  03/25/20 1540

## 2020-03-25 NOTE — ED NOTES
Pt comes in with S/T that started last noc, and chest pain last Friday and pain is a lot better today and he feels the chest pain is acid reflux that comes and goes and usually his acid reflux last a day or two and this has been longer and feels its causing his s/t as he has had in the past. Pt takes Prilosec for this but it's not helping today. Pain gets worse after eating foods such as spice, and fried. And usually the Prilosec helps but not this time. Pt is a/o x 4. Monitor. He denies fever/chills, N/V/D, having normal BM.  Pt admits to eating and drinking coffee a week ago that makes it worse. He called the nurse line and was told to come in.

## 2020-03-25 NOTE — DISCHARGE INSTRUCTIONS
You may increase your omeprazole to 20 mg twice daily if needed.  Focus on reducing those things in your diet that increase reflux including big meals before bed, alcohol, caffeine, chocolate, peppermint, onions, garlic, and other irritants.  You may add Maalox or Mylanta liquid 4 times daily as needed but do not take it with other medications.  Seen if you develop a cough, fevers, shortness of breath, or other new concerning symptoms.

## 2020-06-04 DIAGNOSIS — K21.9 GASTROESOPHAGEAL REFLUX DISEASE, ESOPHAGITIS PRESENCE NOT SPECIFIED: ICD-10-CM

## 2020-06-04 RX ORDER — OMEPRAZOLE 40 MG/1
CAPSULE, DELAYED RELEASE ORAL
Qty: 90 CAPSULE | Refills: 1 | Status: SHIPPED | OUTPATIENT
Start: 2020-06-04 | End: 2020-11-09

## 2020-06-04 NOTE — TELEPHONE ENCOUNTER
Patient has one dose left. Can we expedite today?  Please advise.  Ailyn Epperson  [facility] [place]

## 2020-06-04 NOTE — TELEPHONE ENCOUNTER
"Requested Prescriptions   Pending Prescriptions Disp Refills     omeprazole (PRILOSEC) 40 MG DR capsule [Pharmacy Med Name: Omeprazole 40 MG Oral Capsule Delayed Release] 90 capsule 0     Sig: Take 1 capsule by mouth once daily       PPI Protocol Failed - 6/4/2020  2:33 PM        Failed - Recent (12 mo) or future (30 days) visit within the authorizing provider's specialty     Patient has had an office visit with the authorizing provider or a provider within the authorizing providers department within the previous 12 mos or has a future within next 30 days. See \"Patient Info\" tab in inbasket, or \"Choose Columns\" in Meds & Orders section of the refill encounter.              Passed - Not on Clopidogrel (unless Pantoprazole ordered)        Passed - No diagnosis of osteoporosis on record        Passed - Medication is active on med list        Passed - Patient is age 18 or older             "

## 2020-10-27 ENCOUNTER — NURSE TRIAGE (OUTPATIENT)
Dept: NURSING | Facility: CLINIC | Age: 45
End: 2020-10-27

## 2020-10-28 ENCOUNTER — HOSPITAL ENCOUNTER (EMERGENCY)
Facility: CLINIC | Age: 45
Discharge: HOME | End: 2020-10-28
Attending: FAMILY MEDICINE | Admitting: FAMILY MEDICINE
Payer: COMMERCIAL

## 2020-10-28 ENCOUNTER — APPOINTMENT (OUTPATIENT)
Dept: GENERAL RADIOLOGY | Facility: CLINIC | Age: 45
End: 2020-10-28
Attending: FAMILY MEDICINE
Payer: COMMERCIAL

## 2020-10-28 ENCOUNTER — TELEPHONE (OUTPATIENT)
Dept: FAMILY MEDICINE | Facility: CLINIC | Age: 45
End: 2020-10-28

## 2020-10-28 VITALS
BODY MASS INDEX: 27.32 KG/M2 | SYSTOLIC BLOOD PRESSURE: 116 MMHG | TEMPERATURE: 97.9 F | HEART RATE: 85 BPM | WEIGHT: 185 LBS | RESPIRATION RATE: 10 BRPM | DIASTOLIC BLOOD PRESSURE: 83 MMHG | OXYGEN SATURATION: 95 %

## 2020-10-28 DIAGNOSIS — K21.00 GASTROESOPHAGEAL REFLUX DISEASE WITH ESOPHAGITIS WITHOUT HEMORRHAGE: ICD-10-CM

## 2020-10-28 DIAGNOSIS — R05.9 COUGH: ICD-10-CM

## 2020-10-28 DIAGNOSIS — R10.9 LEFT FLANK PAIN: ICD-10-CM

## 2020-10-28 LAB
ALBUMIN SERPL-MCNC: 3.8 G/DL (ref 3.4–5)
ALBUMIN UR-MCNC: NEGATIVE MG/DL
ALP SERPL-CCNC: 97 U/L (ref 40–150)
ALT SERPL W P-5'-P-CCNC: 56 U/L (ref 0–70)
ANION GAP SERPL CALCULATED.3IONS-SCNC: 4 MMOL/L (ref 3–14)
APPEARANCE UR: CLEAR
AST SERPL W P-5'-P-CCNC: 33 U/L (ref 0–45)
BASOPHILS # BLD AUTO: 0 10E9/L (ref 0–0.2)
BASOPHILS NFR BLD AUTO: 0.6 %
BILIRUB DIRECT SERPL-MCNC: 0.2 MG/DL (ref 0–0.2)
BILIRUB SERPL-MCNC: 1 MG/DL (ref 0.2–1.3)
BILIRUB UR QL STRIP: NEGATIVE
BUN SERPL-MCNC: 10 MG/DL (ref 7–30)
CALCIUM SERPL-MCNC: 9 MG/DL (ref 8.5–10.1)
CHLORIDE SERPL-SCNC: 105 MMOL/L (ref 94–109)
CO2 SERPL-SCNC: 29 MMOL/L (ref 20–32)
COLOR UR AUTO: YELLOW
CREAT SERPL-MCNC: 0.78 MG/DL (ref 0.66–1.25)
DIFFERENTIAL METHOD BLD: NORMAL
EOSINOPHIL # BLD AUTO: 0.1 10E9/L (ref 0–0.7)
EOSINOPHIL NFR BLD AUTO: 1.5 %
ERYTHROCYTE [DISTWIDTH] IN BLOOD BY AUTOMATED COUNT: 13.2 % (ref 10–15)
GFR SERPL CREATININE-BSD FRML MDRD: >90 ML/MIN/{1.73_M2}
GLUCOSE SERPL-MCNC: 86 MG/DL (ref 70–99)
GLUCOSE UR STRIP-MCNC: NEGATIVE MG/DL
HCT VFR BLD AUTO: 47 % (ref 40–53)
HGB BLD-MCNC: 15 G/DL (ref 13.3–17.7)
HGB UR QL STRIP: NEGATIVE
IMM GRANULOCYTES # BLD: 0 10E9/L (ref 0–0.4)
IMM GRANULOCYTES NFR BLD: 0.4 %
KETONES UR STRIP-MCNC: 5 MG/DL
LEUKOCYTE ESTERASE UR QL STRIP: NEGATIVE
LYMPHOCYTES # BLD AUTO: 1.1 10E9/L (ref 0.8–5.3)
LYMPHOCYTES NFR BLD AUTO: 20 %
MCH RBC QN AUTO: 27.2 PG (ref 26.5–33)
MCHC RBC AUTO-ENTMCNC: 31.9 G/DL (ref 31.5–36.5)
MCV RBC AUTO: 85 FL (ref 78–100)
MONOCYTES # BLD AUTO: 1 10E9/L (ref 0–1.3)
MONOCYTES NFR BLD AUTO: 17.6 %
MUCOUS THREADS #/AREA URNS LPF: PRESENT /LPF
NEUTROPHILS # BLD AUTO: 3.2 10E9/L (ref 1.6–8.3)
NEUTROPHILS NFR BLD AUTO: 59.9 %
NITRATE UR QL: NEGATIVE
NRBC # BLD AUTO: 0 10*3/UL
NRBC BLD AUTO-RTO: 0 /100
PH UR STRIP: 6 PH (ref 5–7)
PLATELET # BLD AUTO: 331 10E9/L (ref 150–450)
POTASSIUM SERPL-SCNC: 3.7 MMOL/L (ref 3.4–5.3)
PROT SERPL-MCNC: 7.4 G/DL (ref 6.8–8.8)
RBC # BLD AUTO: 5.51 10E12/L (ref 4.4–5.9)
RBC #/AREA URNS AUTO: 0 /HPF (ref 0–2)
SODIUM SERPL-SCNC: 138 MMOL/L (ref 133–144)
SOURCE: ABNORMAL
SP GR UR STRIP: 1.01 (ref 1–1.03)
TROPONIN I SERPL-MCNC: <0.015 UG/L (ref 0–0.04)
UROBILINOGEN UR STRIP-MCNC: 0 MG/DL (ref 0–2)
WBC # BLD AUTO: 5.4 10E9/L (ref 4–11)
WBC #/AREA URNS AUTO: <1 /HPF (ref 0–5)

## 2020-10-28 PROCEDURE — 80076 HEPATIC FUNCTION PANEL: CPT | Performed by: FAMILY MEDICINE

## 2020-10-28 PROCEDURE — 93010 ELECTROCARDIOGRAM REPORT: CPT | Performed by: FAMILY MEDICINE

## 2020-10-28 PROCEDURE — 71045 X-RAY EXAM CHEST 1 VIEW: CPT

## 2020-10-28 PROCEDURE — 80048 BASIC METABOLIC PNL TOTAL CA: CPT | Performed by: FAMILY MEDICINE

## 2020-10-28 PROCEDURE — 81001 URINALYSIS AUTO W/SCOPE: CPT | Performed by: FAMILY MEDICINE

## 2020-10-28 PROCEDURE — 84484 ASSAY OF TROPONIN QUANT: CPT | Performed by: FAMILY MEDICINE

## 2020-10-28 PROCEDURE — U0003 INFECTIOUS AGENT DETECTION BY NUCLEIC ACID (DNA OR RNA); SEVERE ACUTE RESPIRATORY SYNDROME CORONAVIRUS 2 (SARS-COV-2) (CORONAVIRUS DISEASE [COVID-19]), AMPLIFIED PROBE TECHNIQUE, MAKING USE OF HIGH THROUGHPUT TECHNOLOGIES AS DESCRIBED BY CMS-2020-01-R: HCPCS | Performed by: FAMILY MEDICINE

## 2020-10-28 PROCEDURE — C9803 HOPD COVID-19 SPEC COLLECT: HCPCS | Performed by: FAMILY MEDICINE

## 2020-10-28 PROCEDURE — 85025 COMPLETE CBC W/AUTO DIFF WBC: CPT | Performed by: FAMILY MEDICINE

## 2020-10-28 PROCEDURE — 99285 EMERGENCY DEPT VISIT HI MDM: CPT | Mod: 25 | Performed by: FAMILY MEDICINE

## 2020-10-28 NOTE — LETTER
October 28, 2020      To Whom It May Concern:      Daniele Carter was seen in our Emergency Department today, 10/28/20.  He should not return to work until his test has returned negative.  This should return within the next 1 to 2 days.    Sincerely,        Laron Moreno MD

## 2020-10-28 NOTE — TELEPHONE ENCOUNTER
Advised er now as he has chest pain continued. SOB now and then but he has asthma. Also back and left sided pain . He has left shoulder pain but has a history of bilat shoulder pain.Temp is 98.4 today. Rates his pain at a 3, but yesterday it was bad. Denies dizzy or weak but very tired yesterday. Describes the pain as tight. Concern for a cardiac event.Advised he not drive self.   Winter Berrios RN

## 2020-10-28 NOTE — DISCHARGE INSTRUCTIONS
"Return to the Emergency Room if the following occurs:     Severely worsened pain, vomiting/dehydration, worsened breathing, or for any concern at anytime.    Or, follow-up with the following provider as we discussed:     Return to your primary doctor next week for reevaluation if your COVID-19 test is negative.  If your test is positive, follow the instructions provided below.    Medications discussed:    Tylenol for comfort.    Continue the Prilosec as previously directed.  Maalox/Mylanta/Tums for acute pain.  Avoid caffeine, smoking, chewing tobacco, ibuprofen/advil/aleve, alcohol, eating within 2-3 hours of bed.    If you received pain-relieving or sedating medication during your time in the ER, avoid alcohol, driving automobiles, or working with machinery.  Also, a responsible adult must stay with you.        Call the Nurse Advice Line at [phone] or [phone] for any concern at anytime.      Discharge Instructions for COVID-19 Patients  You have--or may have--COVID-19. Please follow the instructions listed below.   If you have a weakened immune system, discuss with your doctor any other actions you need to take.  How can I protect others?  If you have symptoms (fever, cough, body aches or trouble breathing):  Stay home and away from others (self-isolate) until:  At least 10 days have passed since your symptoms started, And   You've had no fever--and no medicine that reduces fever--for 1 full day (24 hours), And    Your other symptoms have resolved (gotten better).  If you don't show symptoms, but testing showed that you have COVID-19:  Stay home and away from others (self-isolate). Follow the tips under \"How do I self-isolate?\" below for 10 days (20 days if you have a weak immune system).  You don't need to be retested for COVID-19 before going back to school or work. As long as you're fever-free and feeling better, you can go back to school, work and other activities after waiting the 10 or 20 days. " "  How do I self-isolate?  Stay in your own room, even for meals. Use your own bathroom if you can.  Stay away from others in your home. No hugging, kissing or shaking hands. No visitors.  Don't go to work, school or anywhere else.  Clean \"high touch\" surfaces often (doorknobs, counters, handles). Use household cleaning spray or wipes. You'll find a full list of  on the EPA website: www.epa.gov/pesticide-registration/list-n-disinfectants-use-against-sars-cov-2.  Cover your mouth and nose with a mask or other face covering to avoid spreading germs.  Wash your hands and face often. Use soap and water.  Caregivers in these groups are at risk for severe illness due to COVID-19:  People 65 years and older  People who live in a nursing home or long-term care facility  People with chronic disease (lung, heart, cancer, diabetes, kidney, liver, immunologic)  People who have a weakened immune system, including those who:  Are in cancer treatment  Take medicine that weakens the immune system, such as corticosteroids  Had a bone marrow or organ transplant  Have an immune deficiency  Have poorly controlled HIV or AIDS  Are obese (body mass index of 40 or higher)  Smoke regularly  Caregivers should wear gloves while washing dishes, handling laundry and cleaning bedrooms and bathrooms.  Use caution when washing and drying laundry: Don't shake dirty laundry and use the warmest water setting that you can.  For more tips on managing your health at home, go to www.cdc.gov/coronavirus/2019-ncov/downloads/10Things.pdf.  How can I take care of myself at home?  Get lots of rest. Drink extra fluids (unless a doctor has told you not to).    Take Tylenol (acetaminophen) for fever or pain. If you have liver or kidney problems, ask your family doctor if it's okay to take Tylenol.     Adults can take either:  650 mg (two 325 mg pills) every 4 to 6 hours, or   1,000 mg (two 500 mg pills) every 8 hours as needed.  Note: Don't take more " than 3,000 mg in one day. Acetaminophen is found in many medicines (both prescribed and over-the-counter medicines). Read all labels to be sure you don't take too much.   For children, check the Tylenol bottle for the right dose. The dose is based on the child's age or weight.  If you have other health problems (like cancer, heart failure, an organ transplant or severe kidney disease): Call your specialty clinic if you don't feel better in the next 2 days.    Know when to call 911. Emergency warning signs include:  Trouble breathing or shortness of breath  Pain or pressure in the chest that doesn't go away  Feeling confused like you haven't felt before, or not being able to wake up  Bluish-colored lips or face    Your doctor may have prescribed a blood thinner medicine. Follow their instructions.  Where can I get more information?  [facility] - About COVID-19: [email or web address]  CDC - What to Do If You're Sick: www.cdc.gov/coronavirus/2019-ncov/about/steps-when-sick.html  CDC - Ending Home Isolation: www.cdc.gov/coronavirus/2019-ncov/hcp/disposition-in-home-patients.html  CDC - Caring for Someone: www.cdc.gov/coronavirus/2019-ncov/if-you-are-sick/care-for-someone.html  [facility] - Interim Guidance for Hospital Discharge to Home: www.health.[facility].mn.us/diseases/coronavirus/hcp/hospdischarge.pdf  [facility] clinical trials (COVID-19 research studies): clinicalaffairs.[facility]-clinical-trials  Below are the COVID-19 hotlines at the [facility] ([facility]). Interpreters are available.  For health questions: Call 157-902-5475 or 1-412.806.7807 (7 a.m. to 7 p.m.)  For questions about schools and childcare: Call 614-430-3108 or 1-840.573.9714 (7 a.m. to 7 p.m.)    For informational purposes only. Not to replace the advice of your health care provider. Clinically reviewed by the Infection Prevention Team. Copyright   2020 [employer]. All rights reserved. [employer]  202699 - REV 08/04/20.

## 2020-10-28 NOTE — ED PROVIDER NOTES
"  HPI   The patient is a 45-year-old male presenting with multiple concerns.  He has a known history of reflux and he experiences this on a regular basis.  He knows that when he drinks too much alcohol or caffeine or eats too much food his reflux becomes exacerbated.  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  Recently, he acknowledges that he has not been sleeping well.  He has been taking more caffeine than usual.  He has been taking more alcohol than usual as well, especially over the past 2 weeks.  In general, he drinks alcohol twice a week.  He takes his omeprazole on a daily basis.  He does not use NSAIDs on a regular basis.  He does report having an upper and lower endoscopy \"about 4 years ago.\"  These are reportedly negative for pathology.  He has had a work-up in the past for his heart and this is also been unremarkable.    The patient has had burning chest discomfort off and on over the past 2 weeks especially.  His symptoms have worsened compared to his baseline but the pain he is experiencing is similar.  The pain is felt in the midline chest.  It does not radiate toward his neck, jaw, back, or extremities.  He will occasionally have left flank pain as well but this does not seem to correlate with his chest burning.  He denies associated symptoms with the chest burning.  Specifically, he denies having shortness of breath or feeling lightheaded or having palpitations.  He does report having a cough that is new over the past 2 days.  He does report feeling hot flashes but this is not a new problem.  The hot flashes do seem to come on with his reflux.  He has also recognized some wheezing that seems to come on more frequently at night when his reflux is bad.  He denies new leg pain or swelling.  He denies dysuria, urgency, or frequency of urination.  He denies hematuria.  He denies testicular pain, swelling, or tenderness.  His left flank pain comes on without obvious cause.  It will last " for up to 15 to 20 minutes.  It does not radiate into his buttocks or lower extremity.  He denies having back pain or having exacerbation of symptoms with movement or position change.        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 03/22/2010     Priority: Medium     Left-sided chest wall pain 09/14/2009     Priority: Medium     Acid reflux 09/14/2009     Priority: Medium      Past Medical History:    Past Medical History:   Diagnosis Date     Esophageal reflux      Hyperlipidemia      Pain in joint, lower leg      Past Surgical History:    Past Surgical History:   Procedure Laterality Date     COLONOSCOPY  5/6/2011    Procedure:COLONOSCOPY; Surgeon:JONAS JAIN; Location:[facility]     ESOPHAGOSCOPY, GASTROSCOPY, DUODENOSCOPY (EGD), COMBINED N/A 3/28/2017    Procedure: COMBINED ESOPHAGOSCOPY, GASTROSCOPY, DUODENOSCOPY (EGD), BIOPSY SINGLE OR MULTIPLE;  Surgeon: Nikolai Valladares MD;  Location: [facility]     Family History:    Family History   Problem Relation Age of Onset     Cancer Father         lymphoma     Diabetes Father         type 2     Heart Disease Father      Diabetes Maternal Grandfather      Lipids Mother      Alzheimer Disease Mother      Social History:  Marital Status:  Significant other [7]  Social History     Tobacco Use     Smoking status: Never Smoker     Smokeless tobacco: Never Used   Substance Use Topics     Alcohol use: Yes     Comment: weekends     Drug use: No      Medications:         albuterol (PROAIR HFA/PROVENTIL HFA/VENTOLIN HFA) 108 (90 BASE) MCG/ACT Inhaler       cyclobenzaprine (FLEXERIL) 10 MG tablet       EPINEPHrine  (EPIPEN/ADRENACLICK/OR ANY BX GENERIC EQUIV) 0.3 MG/0.3ML injection 2-pack       FLOVENT  MCG/ACT Inhaler       loratadine (CLARITIN) 10 MG tablet       omeprazole (PRILOSEC) 40 MG DR capsule       triamcinolone (KENALOG) 0.1 % external cream      Review of Systems   All other systems reviewed and are negative.      PE   BP: 133/89  Pulse: 101  Temp: 97.9  F (36.6  C)  Resp: 18  Weight: 83.9 kg (185 lb)  SpO2: 100 %  Physical Exam  Vitals signs and nursing note reviewed.   Constitutional:       General: He is not in acute distress.  HENT:      Head: Atraumatic.      Right Ear: External ear normal.      Left Ear: External ear normal.      Nose: Nose normal.      Mouth/Throat:      Mouth: Mucous membranes are moist.      Pharynx: Oropharynx is clear.   Eyes:      General: No scleral icterus.     Extraocular Movements: Extraocular movements intact.      Conjunctiva/sclera: Conjunctivae normal.      Pupils: Pupils are equal, round, and reactive to light.   Neck:      Musculoskeletal: Normal range of motion.   Cardiovascular:      Rate and Rhythm: Normal rate.   Pulmonary:      Effort: Pulmonary effort is normal. No respiratory distress.   Chest:      Chest wall: No tenderness.   Abdominal:      Palpations: Abdomen is soft. There is no mass.      Tenderness: There is no abdominal tenderness.   Musculoskeletal: Normal range of motion.      Right lower leg: He exhibits no tenderness. No edema.      Left lower leg: He exhibits no tenderness. No edema.   Skin:     General: Skin is warm and dry.   Neurological:      Mental Status: He is alert and oriented to person, place, and time.   Psychiatric:         Behavior: Behavior normal.         ED COURSE and MDM   1100.  The patient has multiple symptoms that have been ongoing for quite a long time.  He does describe new coughing, worsened midline chest discomfort (specifically described as burning), and more frequent hot flashes.  He also has headache that has been coming  and going over the past 2 days.  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  He was last tested about a week ago.  We will retest today.  Chest x-ray pending.  Lab values pending.  EKG is unremarkable and described below.    1313.  The work-up here has been unremarkable.  Chest x-ray shows no acute pathology.  Blood work is encouraging.  Troponin negative.  Low concern for recent myocardial infarction or myocarditis.  Low concern for pulmonary source of symptoms.  Low concern for severe intra-abdominal pathology.  He is without abdominal tenderness.  He is without abdominal pain currently.  No emergent need for CT imaging.  Urine analysis clean for infection and hematuria.  Low concern for ureteral stone.  COVID-19?  Could he have infectious symptoms on top of his chronic reflux?  He has multiple risk factors for reflux which we talked about.  He will continue Prilosec.  Work note provided.    EKG  (1010)   Interpretation performed by me.  Rate: 93     Rhythm: sinus     Axis: nl  Intervals: VT (12-2) 134, QRS (<12) 90, QTc (>5) 409  P wave: nl     QRS complex: nl  ST segment / T-wave: nl  Conclusion: nl    LABS  Labs Ordered and Resulted from Time of ED Arrival Up to the Time of Departure from the ED   ROUTINE UA WITH MICROSCOPIC REFLEX TO CULTURE - Abnormal; Notable for the following components:       Result Value    Ketones Urine 5 (*)     Mucous Urine Present (*)     All other components within normal limits   CBC WITH PLATELETS DIFFERENTIAL   BASIC METABOLIC PANEL   HEPATIC PANEL   TROPONIN I   COVID-19 VIRUS (CORONAVIRUS) BY PCR       IMAGING  Images reviewed by me.  Radiology report also reviewed.  XR Chest Port 1 View   Final Result   IMPRESSION: Cardiac silhouette is within normal limits. No evidence   for infiltrate, effusion, or pneumothorax. No significant bony   abnormalities.      PHILLY PLASCENCIA MD          Procedures    Medications - No data to  display      IMPRESSION       ICD-10-CM    1. Gastroesophageal reflux disease with esophagitis without hemorrhage  K21.00 UA with Microscopic reflex to Culture   2. Cough  R05    3. Left flank pain  R10.9             Medication List      There are no discharge medications for this visit.                       Laron Moreno MD  10/28/20 6590

## 2020-10-28 NOTE — ED AVS SNAPSHOT
[facility] Emergency Dept  [address] [address]  Phone: [phone]  Fax: [phone]                                    Daniele Carter   MRN: [id number]    Department: [facility] Emergency Dept   Date of Visit: 10/28/2020           After Visit Summary Signature Page    I have received my discharge instructions, and my questions have been answered. I have discussed any challenges I see with this plan with the nurse or doctor.    ..........................................................................................................................................  Patient/Patient Representative Signature      ..........................................................................................................................................  Patient Representative Print Name and Relationship to Patient    ..................................................               ................................................  Date                                   Time    ..........................................................................................................................................  Reviewed by Signature/Title    ...................................................              ..............................................  Date                                               Time          22EPIC Rev 08/18

## 2020-10-28 NOTE — ED NOTES
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   Pt reports chest pain started yesterday.   Hx of acid reflux and ate fish sandwich and felt nauseated.  Pt takes omeprazole.     Pt also reports hot flashes and intermittent left lower abdominal pain.   Pt states increased stress due to mother's condition and often spends time with mother after work and does not sleep much. Pt drinks a lot of coffee to compensate and drinks beer to get to sleep when able.

## 2020-10-28 NOTE — TELEPHONE ENCOUNTER
Frank reports that he has had continual chest pain and pressure for the last 4 hours.    He believes that it might be reflux. He took an extra omeprazole about 20 minutes ago.    He is concerned about a slight elevation in temp - 99.4  orally.  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    ER advised.    COVID 19 Nurse Triage Plan/Patient Instructions    Please be aware that novel coronavirus (COVID-19) may be circulating in the community. If you develop symptoms such as fever, cough, or SOB or if you have concerns about the presence of another infection including coronavirus (COVID-19), please contact your health care provider or visit [email or web address].     Disposition/Instructions    ED Visit recommended. Follow protocol based instructions.     Bring Your Own Device:  Please also bring your smart device(s) (smart phones, tablets, laptops) and their charging cables for your personal use and to communicate with your care team during your visit.    Thank you for taking steps to prevent the spread of this virus.  o Limit your contact with others.  o Wear a simple mask to cover your cough.  o Wash your hands well and often.    Resources    [facility]: About COVID-19: [email or web address]    CDC: What to Do If You're Sick: www.cdc.gov/coronavirus/2019-ncov/about/steps-when-sick.html    CDC: Ending Home Isolation: www.cdc.gov/coronavirus/2019-ncov/hcp/disposition-in-home-patients.html     CDC: Caring for Someone: www.cdc.gov/coronavirus/2019-ncov/if-you-are-sick/care-for-someone.html     [facility]: Interim Guidance for Hospital Discharge to Home: www.health.[facility].[place]/diseases/coronavirus/hcp/hospdischarge.pdf    [facility] clinical trials (COVID-19 research studies): [email or web address].[facility]/umn-clinical-trials     Below are the COVID-19 hotlines at the [place] Department of Health ([facility]). Interpreters are available.   o For health questions: Call 442-542-8149  or 1-491.964.3829 (7 a.m. to 7 p.m.)  o For questions about schools and childcare: Call 924-954-2721 or 1-966.989.2624 (7 a.m. to 7 p.m.)     Socorro Christensen RN  [facility]      Reason for Disposition    SEVERE or constant chest pain or pressure (Exception: mild central chest pain, present only when coughing)    Additional Information    Negative: SEVERE difficulty breathing (e.g., struggling for each breath, speaks in single words)    Negative: Difficult to awaken or acting confused (e.g., disoriented, slurred speech)    Negative: Bluish (or gray) lips or face now    Negative: Shock suspected (e.g., cold/pale/clammy skin, too weak to stand, low BP, rapid pulse)    Negative: Sounds like a life-threatening emergency to the triager    Protocols used: CORONAVIRUS (COVID-19) DIAGNOSED OR [id number] 8.4.20

## 2020-10-29 ENCOUNTER — PATIENT OUTREACH (OUTPATIENT)
Dept: NURSING | Facility: CLINIC | Age: 45
End: 2020-10-29
Payer: COMMERCIAL

## 2020-10-29 LAB
SARS-COV-2 RNA SPEC QL NAA+PROBE: ABNORMAL
SPECIMEN SOURCE: ABNORMAL

## 2020-10-29 ASSESSMENT — ACTIVITIES OF DAILY LIVING (ADL): DEPENDENT_IADLS:: INDEPENDENT

## 2020-10-29 NOTE — PROGRESS NOTES
Clinic Care Coordination Contact    Clinic Care Coordination Contact  OUTREACH    Referral Information:  Referral Source: ED Follow-Up    Primary Diagnosis: Other (include Comment box)(COVID-19)    Chief Complaint   Patient presents with     Clinic Care Coordination - Post Hospital     ED f/u- RN MADDIE        [facility]an Utilization: Patient was seen at [facility] ED on 10/28/20  Clinic Utilization  Difficulty keeping appointments:: No  Compliance Concerns: No  No-Show Concerns: No  No PCP office visit in Past Year: No  Utilization    Last refreshed: 10/29/2020  9:41 AM: Hospital Admissions 0           Last refreshed: 10/29/2020  9:41 AM: ED Visits 2           Last refreshed: 10/29/2020  9:41 AM: No Show Count (past year) 0              Current as of: 10/29/2020  9:41 AM              Clinical Concerns:  Current Medical Concerns: Patient presented to ED on 10/28 for evaluation of chest pressure. He was suspected to have flare of GERD but COVID test is also pending therefore patient under Rule Out COVID protocol and prompting care coordination outreach.    During initial call, patient's COVID-19 test happened to result and was POSITIVE.  RN CC relayed this information. Patient very shocked by results and questioned if it could be false positive. RN CC explained recommendations to follow quarantine guidelines. He has a few questions regarding what his family should do as well. We reviewed they should begin quarantine as well.  Patient insistent that he will need a re-test next week as he has to return to work.  Explained reasons why re-testing is not advised. He says his employer will demand it.  RN CC advised he speak further with his employer on guidance following this test result. 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  RN CC explained patient instructions to best ability and provided emotional  support.      Education Provided to patient: explained role and reason for call; COVID-19 test results came back coincidentally during our call as POSITIVE. RN CC did try to review and talk through COVID-19 patient instructions. Also reviewed that he can also anticipate a call from central results team.     Pain  Pain (GOAL):: No  Health Maintenance Reviewed: Due/Overdue  Health Maintenance Due   Topic Date Due     PREVENTIVE CARE VISIT  1977     Pneumococcal Vaccine: Pediatrics (0 to 5 Years) and At-Risk Patients (6 to 64 Years) (1 of 1 - PPSV23) 08/08/1983     HEPATITIS C SCREENING  08/08/1995     ASTHMA ACTION PLAN  09/03/2014     PHQ-2  01/01/2020     ASTHMA CONTROL TEST  05/06/2020     INFLUENZA VACCINE (1) 09/01/2020       Clinical Pathway: None    Medication Management:  Medication reconciliation status: Medications reviewed and reconciled.  Continue medications without change.       Functional Status:  Dependent ADLs:: Independent  Dependent IADLs:: Independent  Bed or wheelchair confined:: No  Mobility Status: Independent  Fallen 2 or more times in the past year?: No  Any fall with injury in the past year?: No    Living Situation:  Current living arrangement:: I live in a private home with family  Type of residence:: Private [place] [facility]    Lifestyle & Psychosocial Needs:        Diet:: Regular  Inadequate nutrition (GOAL):: No  Tube Feeding: No  Inadequate activity/exercise (GOAL):: No  Significant changes in sleep pattern (GOAL): No  Transportation means:: Regular car     Jewish or spiritual beliefs that impact treatment:: No  Mental health DX:: No  Informal Support system:: Family   Socioeconomic History     Marital status: Significant other     Spouse name: Not on file     Number of children: 1     Years of education: Not on file     Highest education level: Not on file   Occupational History     Occupation:      Employer: [employer]     Tobacco Use     Smoking status: Never  Smoker     Smokeless tobacco: Never Used   Substance and Sexual Activity     Alcohol use: Yes     Comment: weekends     Drug use: No     Sexual activity: Yes     Partners: Female          Resources and Interventions:  Current Resources:      Community Resources: None  Supplies used at home:: None  Equipment Currently Used at Home: none   )   )    Advance Care Plan/Directive  Advanced Care Plans/Directives on file:: No  Advanced Care Plan/Directive Status: Declined Further Information    Referrals Placed: None     Patient/Caregiver understanding: Yes- patient encouraged to schedule PCP virtual follow up if he symptoms do not continue to improve or if he has future questions or new concerns. Reviewed several times to call our clinic care team if he has additional questions arise.  Patient letter with COVID-19 information and resources is being mailed to patient by central results team therefore RN CC will not send duplicate.      Plan: Patient will follow quarantine guidelines during acute recovery from COVID-19 and contact clinic as needed with questions or concerns.  No further care coordination outreaches planned as patient declines at this time.    THERESA Bravo, RN   [facility]  - Clinic Care Coordinator  Phone: [phone]

## 2020-10-30 ENCOUNTER — TELEPHONE (OUTPATIENT)
Dept: EMERGENCY MEDICINE | Facility: CLINIC | Age: 45
End: 2020-10-30

## 2020-10-30 NOTE — TELEPHONE ENCOUNTER
"Coronavirus (COVID-19) Notification    Caller Name (Patient, parent, daughter/son, grandparent, etc)  Patient     Reason for call  Notify of Positive Coronavirus (COVID-19) lab results, assess symptoms,  review [facility] recommendations    Lab Result    Lab test:  2019-nCoV rRt-PCR or SARS-CoV-2 PCR    Oropharyngeal AND/OR nasopharyngeal swabs is POSITIVE for 2019-nCoV RNA/SARS-COV-2 PCR (COVID-19 virus)    RN Recommendations/Instructions per [facility] Coronavirus COVID-19 recommendations    Brief introduction script  Introduce self then review script:  \"I am calling on behalf of [employer].  We were notified that your Coronavirus test (COVID-19) for was POSITIVE for the virus.  I have some information to relay to you but first I wanted to mention that the [place] Dept of Health will be contacting you shortly [it's possible MD already called Patient] to talk to you more about how you are feeling and other people you have had contact with who might now also have the virus.  Also, [facility] is Partnering with [facility] for Covid-19 research, you may be contacted directly by research staff.\"    Assessment (Inquire about Patient's current symptoms)   Assessment   Current Symptoms at time of phone call: (if no symptoms, document No symptoms]  none    Symptoms onset (if applicable)  10/27/20     If at time of call, Patients symptoms hare worsened, the Patient should contact 911 or have someone drive them to Emergency Dept promptly:      If Patient calling 911, inform 911 personal that you have tested positive for the Coronavirus (COVID-19).  Place mask on and await 911 to arrive.    If Emergency Dept, If possible, please have another adult drive you to the Emergency Dept but you need to wear mask when in contact with other people.      Review information with Patient    Your result was positive. This means you have COVID-19 (coronavirus).  We have sent you a letter that reviews the " information that I'll be reviewing with you now.    How can I protect others?    If you have symptoms: stay home and away from others (self-isolate) until:    You've had no fever--and no medicine that reduces fever--for 1 full day (24 hours). And       Your other symptoms have gotten better. For example, your cough or breathing has improved. And     At least 10 days have passed since your symptoms started. (If you've been told by a doctor that you have a weak immune system, wait 20 days.)     If you don't have symptoms: Stay home and away from others (self-isolate) until at least 10 days have passed since your first positive COVID-19 test. (Date test collected)    During this time:    Stay in your own room, including for meals. Use your own bathroom if you can.    Stay away from others in your home. No hugging, kissing or shaking hands. No visitors.     Don't go to work, school or anywhere else.     Clean  high touch  surfaces often (doorknobs, counters, handles, etc.). Use a household cleaning spray or wipes. You'll find a full list on the EPA website at www.epa.gov/pesticide-registration/list-n-disinfectants-use-against-sars-cov-2.     Cover your mouth and nose with a mask, tissue or other face covering to avoid spreading germs.    Wash your hands and face often with soap and water.    Caregivers in these groups are at risk for severe illness due to COVID-19:  o People 65 years and older  o People who live in a nursing home or long-term care facility  o People with chronic disease (lung, heart, cancer, diabetes, kidney, liver, immunologic)  o People who have a weakened immune system, including those who:  - Are in cancer treatment  - Take medicine that weakens the immune system, such as corticosteroids  - Had a bone marrow or organ transplant  - Have an immune deficiency  - Have poorly controlled HIV or AIDS  - Are obese (body mass index of 40 or higher)  - Smoke regularly    Caregivers should wear gloves while  washing dishes, handling laundry and cleaning bedrooms and bathrooms.    Wash and dry laundry with special caution. Don't shake dirty laundry, and use the warmest water setting you can.    If you have a weakened immune system, ask your doctor about other actions you should take.    For more tips, go to www.cdc.gov/coronavirus/2019-ncov/downloads/10Things.pdf.    You should not go back to work until you meet the guidelines above for ending your home isolation. You don't need to be retested for COVID-19 before going back to work--studies show that you won't spread the virus if it's been at least 10 days since your symptoms started (or 20 days, if you have a weak immune system).    Employers: This document serves as formal notice of your employee's medical guidelines for going back to work. They must meet the above guidelines before going back to work in person.    How can I take care of myself?    1. Get lots of rest. Drink extra fluids (unless a doctor has told you not to).    2. Take Tylenol (acetaminophen) for fever or pain. If you have liver or kidney problems, ask your family doctor if it's okay to take Tylenol.     Take either:     650 mg (two 325 mg pills) every 4 to 6 hours, or     1,000 mg (two 500 mg pills) every 8 hours as needed.     Note: Don't take more than 3,000 mg in one day. Acetaminophen is found in many medicines (both prescribed and over-the-counter medicines). Read all labels to be sure you don't take too much.    For children, check the Tylenol bottle for the right dose (based on their age or weight).    3. If you have other health problems (like cancer, heart failure, an organ transplant or severe kidney disease): Call your specialty clinic if you don't feel better in the next 2 days.    4. Know when to call 911: Emergency warning signs include:    Trouble breathing or shortness of breath    Pain or pressure in the chest that doesn't go away    Feeling confused like you haven't felt before, or  not being able to wake up    Bluish-colored lips or face    5. Sign up for [employer]+. We know it's scary to hear that you have COVID-19. We want to track your symptoms to make sure you're okay over the next 2 weeks. Please look for an email from [employer]--this is a free, online program that we'll use to keep in touch. To sign up, follow the link in the email. Learn more at [email or web address].    Where can I get more information?    [facility]: [email or web address]    Coronavirus Basics: www.health.[facility].[place]./diseases/coronavirus/basics.html    What to Do If You're Sick: www.cdc.gov/coronavirus/2019-ncov/about/steps-when-sick.html    Ending Home Isolation: www.cdc.gov/coronavirus/2019-ncov/hcp/disposition-in-home-patients.html     Caring for Someone with COVID-19: www.cdc.gov/coronavirus/2019-ncov/if-you-are-sick/care-for-someone.html     [facility] clinical trials (COVID-19 research studies): [email or web address].[facility]-clinical-trials     A Positive COVID-19 letter will be sent via [employer] or the mail. (Exception, no letters sent to Presurgerical/Preprocedure Patients)    [Name]  Tabitha Tavarez RN  [employer] [facility]  COVID19 Results Team RN  Ph# [phone]

## 2020-10-31 ENCOUNTER — NURSE TRIAGE (OUTPATIENT)
Dept: NURSING | Facility: CLINIC | Age: 45
End: 2020-10-31

## 2020-10-31 NOTE — TELEPHONE ENCOUNTER
They can't see the test result to prove he took a COVID-19 test.  He needs it for work to prove, as they are threatening firing the patient if they don't get proof of positive result.      Patient is inquiring as to why can't find COVID-19 resutls in My Chart.      Advised that letter is coming in the mail and also transferred to medical records.     Stephanie Reyes RN on 10/31/2020 at 10:14 AM                 Additional Information    Negative: [1] Caller is not with the adult (patient) AND [2] reporting urgent symptoms    Negative: Lab result questions    Negative: Medication questions    Negative: Caller can't be reached by phone    Negative: Caller has already spoken to PCP or another triager    Negative: RN needs further essential information from caller in order to complete triage    Negative: Requesting regular office appointment    Negative: [1] Caller requesting NON-URGENT health information AND [2] PCP's office is the best resource    Health Information question, no triage required and triager able to answer question    Protocols used: INFORMATION ONLY CALL-A-

## 2020-11-01 ENCOUNTER — NURSE TRIAGE (OUTPATIENT)
Dept: NURSING | Facility: CLINIC | Age: 45
End: 2020-11-01

## 2020-11-01 NOTE — TELEPHONE ENCOUNTER
"  Pt calls in looking for covid result >    COVID-19 Virus PCR to [facility] - Result Detected, Abnormal ResultPanic    4d ago    Coronavirus (COVID-19) Notification    Caller Name (Patient, parent, daughter/son, grandparent, etc)  Daniele Carter    Reason for call  Notify of Positive Coronavirus (COVID-19) lab results, assess symptoms,  review [facility] recommendations    Lab Result    Lab test:  2019-nCoV rRt-PCR or SARS-CoV-2 PCR    Oropharyngeal AND/OR nasopharyngeal swabs is POSITIVE for 2019-nCoV RNA/SARS-COV-2 PCR (COVID-19 virus)    RN Recommendations/Instructions per [facility] Coronavirus COVID-19 recommendations    Brief introduction script  Introduce self then review script:  \"I am calling on behalf of [employer].  We were notified that your Coronavirus test (COVID-19) for was POSITIVE for the virus.  I have some information to relay to you but first I wanted to mention that the [place] Dept of Health will be contacting you shortly [it's possible MD already called Patient] to talk to you more about how you are feeling and other people you have had contact with who might now also have the virus.  Also, [facility] is Partnering with the [facility] for Covid-19 research, you may be contacted directly by research staff.\"    Assessment (Inquire about Patient's current symptoms)   Assessment   Current Symptoms at time of phone call: (if no symptoms, document No symptoms] Runny nose/hot flashes   Symptoms onset (if applicable) 10/27/20     If at time of call, Patients symptoms hare worsened, the Patient should contact [facility] or have someone drive them to Emergency Dept promptly:      If Patient calling 911, inform 911 personal that you have tested positive for the Coronavirus (COVID-19).  Place mask on and await 911 to arrive.    If Emergency Dept, If possible, please have another adult drive you to the Emergency Dept but you need to wear mask when in contact with other people.  "     Review information with Patient    Your result was positive. This means you have COVID-19 (coronavirus).  We have sent you a letter that reviews the information that I'll be reviewing with you now.    How can I protect others?    If you have symptoms: stay home and away from others (self-isolate) until:    You've had no fever--and no medicine that reduces fever--for 1 full day (24 hours). And       Your other symptoms have gotten better. For example, your cough or breathing has improved. And     At least 10 days have passed since your symptoms started. (If you've been told by a doctor that you have a weak immune system, wait 20 days.)     If you don't have symptoms: Stay home and away from others (self-isolate) until at least 10 days have passed since your first positive COVID-19 test. (Date test collected)    During this time:    Stay in your own room, including for meals. Use your own bathroom if you can.    Stay away from others in your home. No hugging, kissing or shaking hands. No visitors.     Don't go to work, school or anywhere else.     Clean  high touch  surfaces often (doorknobs, counters, handles, etc.). Use a household cleaning spray or wipes. You'll find a full list on the EPA website at www.epa.gov/pesticide-registration/list-n-disinfectants-use-against-sars-cov-2.     Cover your mouth and nose with a mask, tissue or other face covering to avoid spreading germs.    Wash your hands and face often with soap and water.    Caregivers in these groups are at risk for severe illness due to COVID-19:  o People 65 years and older  o People who live in a nursing home or long-term care facility  o People with chronic disease (lung, heart, cancer, diabetes, kidney, liver, immunologic)  o People who have a weakened immune system, including those who:  - Are in cancer treatment  - Take medicine that weakens the immune system, such as corticosteroids  - Had a bone marrow or organ transplant  - Have an immune  deficiency  - Have poorly controlled HIV or AIDS  - Are obese (body mass index of 40 or higher)  - Smoke regularly    Caregivers should wear gloves while washing dishes, handling laundry and cleaning bedrooms and bathrooms.    Wash and dry laundry with special caution. Don't shake dirty laundry, and use the warmest water setting you can.    If you have a weakened immune system, ask your doctor about other actions you should take.    For more tips, go to www.cdc.gov/coronavirus/2019-ncov/downloads/10Things.pdf.    You should not go back to work until you meet the guidelines above for ending your home isolation. You don't need to be retested for COVID-19 before going back to work--studies show that you won't spread the virus if it's been at least 10 days since your symptoms started (or 20 days, if you have a weak immune system).    Employers: This document serves as formal notice of your employee's medical guidelines for going back to work. They must meet the above guidelines before going back to work in person.    How can I take care of myself?    1. Get lots of rest. Drink extra fluids (unless a doctor has told you not to).    2. Take Tylenol (acetaminophen) for fever or pain. If you have liver or kidney problems, ask your family doctor if it's okay to take Tylenol.     Take either:     650 mg (two 325 mg pills) every 4 to 6 hours, or     1,000 mg (two 500 mg pills) every 8 hours as needed.     Note: Don't take more than 3,000 mg in one day. Acetaminophen is found in many medicines (both prescribed and over-the-counter medicines). Read all labels to be sure you don't take too much.    For children, check the Tylenol bottle for the right dose (based on their age or weight).    3. If you have other health problems (like cancer, heart failure, an organ transplant or severe kidney disease): Call your specialty clinic if you don't feel better in the next 2 days.    4. Know when to call 911: Emergency warning signs  include:    Trouble breathing or shortness of breath    Pain or pressure in the chest that doesn't go away    Feeling confused like you haven't felt before, or not being able to wake up    Bluish-colored lips or face    5. Sign up for [employer]. We know it's scary to hear that you have COVID-19. We want to track your symptoms to make sure you're okay over the next 2 weeks. Please look for an email from [employer]--this is a free, online program that we'll use to keep in touch. To sign up, follow the link in the email. Learn more at [email or web address].    Where can I get more information?    [facility]: [email or web address]    Coronavirus Basics: www.health.[facility].[place].us/diseases/coronavirus/basics.html    What to Do If You're Sick: www.cdc.gov/coronavirus/2019-ncov/about/steps-when-sick.html    Ending Home Isolation: www.cdc.gov/coronavirus/2019-ncov/hcp/disposition-in-home-patients.html     Caring for Someone with COVID-19: www.cdc.gov/coronavirus/2019-ncov/if-you-are-sick/care-for-someone.html     [facility] clinical trials (COVID-19 research studies): clinicalaffairs.[facility]/n-clinical-trials     A Positive COVID-19 letter will be sent via [employer] or the mail. (Exception, no letters sent to Presurgerical/Preprocedure Patients)    [Name]  Osmin Nelson RN / [facility]                          Reason for Disposition    Caller requesting lab results    Additional Information    Lab result questions    Protocols used: PCP CALL - NO TRIAGE-A-AH, INFORMATION ONLY CALL-A-AH

## 2020-11-09 DIAGNOSIS — K21.9 GASTROESOPHAGEAL REFLUX DISEASE: ICD-10-CM

## 2020-11-09 DIAGNOSIS — K21.9 GASTROESOPHAGEAL REFLUX DISEASE, UNSPECIFIED WHETHER ESOPHAGITIS PRESENT: Primary | ICD-10-CM

## 2020-11-09 NOTE — TELEPHONE ENCOUNTER
Pt calling to ask for refill TODAY - Pt informed he needs clinic appt and declines to schedule appt.

## 2020-11-09 NOTE — TELEPHONE ENCOUNTER
"Requested Prescriptions   Pending Prescriptions Disp Refills     omeprazole (PRILOSEC) 40 MG DR capsule [Pharmacy Med Name: Omeprazole 40 MG Oral Capsule Delayed Release] 90 capsule 0     Sig: Take 1 capsule by mouth once daily       PPI Protocol Failed - 11/9/2020  9:32 AM        Failed - Recent (12 mo) or future (30 days) visit within the authorizing provider's specialty     Patient has had an office visit with the authorizing provider or a provider within the authorizing providers department within the previous 12 mos or has a future within next 30 days. See \"Patient Info\" tab in inbasket, or \"Choose Columns\" in Meds & Orders section of the refill encounter.              Passed - Not on Clopidogrel (unless Pantoprazole ordered)        Passed - No diagnosis of osteoporosis on record        Passed - Medication is active on med list        Passed - Patient is age 18 or older             "

## 2020-11-11 RX ORDER — OMEPRAZOLE 40 MG/1
CAPSULE, DELAYED RELEASE ORAL
Qty: 30 CAPSULE | Refills: 0 | Status: SHIPPED | OUTPATIENT
Start: 2020-11-11 | End: 2020-12-14

## 2020-11-11 NOTE — TELEPHONE ENCOUNTER
Routing refill request to provider for review/approval because:  Patient needs to be seen because it has been more than 1 year since last office visit.    Allie Lincoln RN

## 2020-11-14 ENCOUNTER — HEALTH MAINTENANCE LETTER (OUTPATIENT)
Age: 45
End: 2020-11-14

## 2020-12-14 ENCOUNTER — VIRTUAL VISIT (OUTPATIENT)
Dept: FAMILY MEDICINE | Facility: CLINIC | Age: 45
End: 2020-12-14
Payer: COMMERCIAL

## 2020-12-14 DIAGNOSIS — J45.30 MILD PERSISTENT ASTHMA WITHOUT COMPLICATION: Primary | ICD-10-CM

## 2020-12-14 DIAGNOSIS — K21.9 GASTROESOPHAGEAL REFLUX DISEASE, UNSPECIFIED WHETHER ESOPHAGITIS PRESENT: ICD-10-CM

## 2020-12-14 DIAGNOSIS — G89.29 CHRONIC BILATERAL LOW BACK PAIN WITHOUT SCIATICA: ICD-10-CM

## 2020-12-14 DIAGNOSIS — Z88.9 MULTIPLE ALLERGIES: ICD-10-CM

## 2020-12-14 DIAGNOSIS — M54.50 CHRONIC BILATERAL LOW BACK PAIN WITHOUT SCIATICA: ICD-10-CM

## 2020-12-14 PROCEDURE — 99214 OFFICE O/P EST MOD 30 MIN: CPT | Mod: TEL | Performed by: INTERNAL MEDICINE

## 2020-12-14 RX ORDER — FLUTICASONE PROPIONATE 220 UG/1
2 AEROSOL, METERED RESPIRATORY (INHALATION) 2 TIMES DAILY
Qty: 3 INHALER | Refills: 3 | Status: SHIPPED | OUTPATIENT
Start: 2020-12-14 | End: 2021-08-31

## 2020-12-14 RX ORDER — OMEPRAZOLE 40 MG/1
CAPSULE, DELAYED RELEASE ORAL
Qty: 90 CAPSULE | Refills: 3 | Status: SHIPPED | OUTPATIENT
Start: 2020-12-14 | End: 2021-11-10

## 2020-12-14 RX ORDER — CYCLOBENZAPRINE HCL 10 MG
10 TABLET ORAL 3 TIMES DAILY PRN
Qty: 40 TABLET | Refills: 3 | Status: ON HOLD | OUTPATIENT
Start: 2020-12-14 | End: 2021-09-15

## 2020-12-14 RX ORDER — LORATADINE 10 MG/1
10 TABLET ORAL DAILY PRN
Qty: 90 TABLET | Refills: 3 | Status: ON HOLD | OUTPATIENT
Start: 2020-12-14 | End: 2021-09-15

## 2020-12-14 RX ORDER — ALBUTEROL SULFATE 90 UG/1
2 AEROSOL, METERED RESPIRATORY (INHALATION) EVERY 6 HOURS
Qty: 1 INHALER | Refills: 11 | Status: SHIPPED | OUTPATIENT
Start: 2020-12-14 | End: 2023-10-05

## 2020-12-14 ASSESSMENT — ASTHMA QUESTIONNAIRES
QUESTION_1 LAST FOUR WEEKS HOW MUCH OF THE TIME DID YOUR ASTHMA KEEP YOU FROM GETTING AS MUCH DONE AT WORK, SCHOOL OR AT HOME: SOME OF THE TIME
QUESTION_5 LAST FOUR WEEKS HOW WOULD YOU RATE YOUR ASTHMA CONTROL: SOMEWHAT CONTROLLED
QUESTION_2 LAST FOUR WEEKS HOW OFTEN HAVE YOU HAD SHORTNESS OF BREATH: MORE THAN ONCE A DAY
ACT_TOTALSCORE: 15
QUESTION_3 LAST FOUR WEEKS HOW OFTEN DID YOUR ASTHMA SYMPTOMS (WHEEZING, COUGHING, SHORTNESS OF BREATH, CHEST TIGHTNESS OR PAIN) WAKE YOU UP AT NIGHT OR EARLIER THAN USUAL IN THE MORNING: ONCE OR TWICE
QUESTION_4 LAST FOUR WEEKS HOW OFTEN HAVE YOU USED YOUR RESCUE INHALER OR NEBULIZER MEDICATION (SUCH AS ALBUTEROL): ONCE A WEEK OR LESS

## 2020-12-14 NOTE — PROGRESS NOTES
"Daniele Carter is a 45 year old male who is being evaluated via a billable telephone visit.      The patient has been notified of following:     \"This telephone visit will be conducted via a call between you and your physician/provider. We have found that certain health care needs can be provided without the need for a physical exam.  This service lets us provide the care you need with a short phone conversation.  If a prescription is necessary we can send it directly to your pharmacy.  If lab work is needed we can place an order for that and you can then stop by our lab to have the test done at a later time.    Telephone visits are billed at different rates depending on your insurance coverage. During this emergency period, for some insurers they may be billed the same as an in-person visit.  Please reach out to your insurance provider with any questions.    If during the course of the call the physician/provider feels a telephone visit is not appropriate, you will not be charged for this service.\"    Patient has given verbal consent for Telephone visit?  Yes    What phone number would you like to be contacted at? [phone]    How would you like to obtain your AVS? MyChart    Chief Complaint   Patient presents with     Medication Request     Refill needed on Omeprazole.     Asthma     Recheck/refills on inhalers, claritin 10 mg.        Subjective     Daniele Crater is a 45 year old male who presents via phone visit today for the following health issues:    HPI     Medication Followup of OMEPRAZOLE 40 MG    Taking Medication as prescribed: yes, takes at night before eating every day    Side Effects:  None    Medication Helping Symptoms:  Yes- works well at this dose.  Lower dose doesn't seem as effective.     He continues to work night shift          Asthma Follow-Up    Was ACT completed today?    Yes    ACT Total Scores 12/14/2020   ACT TOTAL SCORE -   ASTHMA ER VISITS -   ASTHMA HOSPITALIZATIONS -   ACT " TOTAL SCORE (Goal Greater than or Equal to 20) 15   In the past 12 months, how many times did you visit the emergency room for your asthma without being admitted to the hospital? 0   In the past 12 months, how many times were you hospitalized overnight because of your asthma? 0       How many days per week do you miss taking your asthma controller medication?  2-3 x week    He has been using albuterol about once per week or 2 weeks    Please describe any recent triggers for your asthma: little bit of everything; pets at home.  Wearing N95 masks at work    Have you had any Emergency Room Visits, Urgent Care Visits, or Hospital Admissions since your last office visit?  No    Review of Systems   Constitutional, pulm, GI systems are negative, except as otherwise noted.       Objective          Vitals:  No vitals were obtained today due to virtual visit.    healthy, alert and no distress  PSYCH: Alert and oriented times 3; coherent speech, normal   rate and volume, able to articulate logical thoughts, able   to abstract reason, no tangential thoughts, no hallucinations   or delusions  His affect is normal  RESP: No cough, no audible wheezing, able to talk in full sentences  Remainder of exam unable to be completed due to telephone visits          Assessment/Plan:    Assessment & Plan     Mild persistent asthma without complication    Not controlled.  He does forget to use the Flovent sometimes- he'll try to be more regular on this and we'll increase the dose up.  Follow-up as needed if not improving.     - albuterol (PROAIR HFA/PROVENTIL HFA/VENTOLIN HFA) 108 (90 Base) MCG/ACT inhaler; Inhale 2 puffs into the lungs every 6 hours This is your rescue inhaler.  - loratadine (CLARITIN) 10 MG tablet; Take 1 tablet (10 mg) by mouth daily as needed for allergies  - fluticasone (FLOVENT HFA) 220 MCG/ACT inhaler; Inhale 2 puffs into the lungs 2 times daily    Gastroesophageal reflux disease, unspecified whether esophagitis  present    He reports symptoms are not controlled on lower PPI dose, so we'll continue his current dose.  Creatinine checked a couple of months ago was normal.     - omeprazole (PRILOSEC) 40 MG DR capsule; Take 1 capsule by mouth once daily    Multiple allergies    Refills provided.    - loratadine (CLARITIN) 10 MG tablet; Take 1 tablet (10 mg) by mouth daily as needed for allergies    Chronic bilateral low back pain without sciatica    Refill provided.  He would like to consider repeat steroid injection at some point, has this years ago, but first he is going to try some modifications at work (moving items on his belt, works at the [employer] so has many things attached to the belt) to see if that helps.      - cyclobenzaprine (FLEXERIL) 10 MG tablet; Take 1 tablet (10 mg) by mouth 3 times daily as needed for muscle spasms         No follow-ups on file.    Elmer Horn MD  [facility]    Phone call duration:  11 minutes

## 2020-12-15 ASSESSMENT — ASTHMA QUESTIONNAIRES: ACT_TOTALSCORE: 15

## 2021-04-12 ENCOUNTER — TELEPHONE (OUTPATIENT)
Dept: FAMILY MEDICINE | Facility: CLINIC | Age: 46
End: 2021-04-12

## 2021-04-12 NOTE — TELEPHONE ENCOUNTER
Patient Quality Outreach Summary      Summary:    Patient is due/failing the following:   ACT needed    Type of outreach:    Sent letter.    Questions for provider review:    None                                                                                                                    ARI Sharif MA

## 2021-04-12 NOTE — LETTER
April 12, 2021      Daniele Carter  [address] [place] [id number]        Dear Daniele,     Your healthcare team cares about your health. To provide you with the best care, we have reviewed your chart and based on our findings, we see that you are due for:   An Asthma Control Test (ACT) that our clinic uses to monitor and manage your asthma. This test is an assessment tool that we use to determine how well your asthma is controlled.  Please complete the enclosed form and return in the provided envelope.  Thank you for choosing [facility] for your healthcare needs. For any questions, concerns, or to schedule an appointment please contact the clinic.   Healthy Regards,    Your [facility] Care Team

## 2021-08-13 ENCOUNTER — TELEPHONE (OUTPATIENT)
Dept: FAMILY MEDICINE | Facility: CLINIC | Age: 46
End: 2021-08-13

## 2021-08-13 DIAGNOSIS — Z28.9 COVID-19 VIRUS VACCINATION NOT DONE: Primary | ICD-10-CM

## 2021-08-13 DIAGNOSIS — Z84.89 FAMILY HISTORY OF ALLERGIES: ICD-10-CM

## 2021-08-16 NOTE — TELEPHONE ENCOUNTER
I recommend consult with immunologist/allergy clinic. Referral signed    ISSA Tran CNP, covering for Dr. Horn

## 2021-08-17 NOTE — TELEPHONE ENCOUNTER
Detailed message left for the patient with the referral and the number to call to schedule. Flori CLEARY RN

## 2021-08-26 NOTE — TELEPHONE ENCOUNTER
I am reviewing this message now that was covered while I was out of office.  I do not recall discussing the COVID vaccine with Frank previously, but I don't see any reason why he should not get it based on his allergy list.  I know he has reacted to flu vaccines in the past, but I think the benefit of getting the COVID vaccine would outweigh the potential risk of reaction since these are different vaccines.  I would recommend getting the vaccine and he can cancel the appointment with the allergist unless he still wants to discuss if further with him.       Thanks,  Elmer Horn MD

## 2021-08-31 ENCOUNTER — OFFICE VISIT (OUTPATIENT)
Dept: ALLERGY | Facility: CLINIC | Age: 46
End: 2021-08-31
Payer: COMMERCIAL

## 2021-08-31 VITALS
HEART RATE: 80 BPM | WEIGHT: 191.14 LBS | DIASTOLIC BLOOD PRESSURE: 81 MMHG | SYSTOLIC BLOOD PRESSURE: 126 MMHG | TEMPERATURE: 97 F | OXYGEN SATURATION: 97 % | BODY MASS INDEX: 28.23 KG/M2

## 2021-08-31 DIAGNOSIS — J45.30 MILD PERSISTENT ASTHMA WITHOUT COMPLICATION: ICD-10-CM

## 2021-08-31 DIAGNOSIS — Z28.9 COVID-19 VIRUS VACCINATION NOT DONE: ICD-10-CM

## 2021-08-31 DIAGNOSIS — T63.461A TOXIC REACTION TO HORNETS, WASPS AND BEES, ACCIDENTAL OR UNINTENTIONAL, INITIAL ENCOUNTER: Primary | ICD-10-CM

## 2021-08-31 DIAGNOSIS — T63.441A TOXIC REACTION TO HORNETS, WASPS AND BEES, ACCIDENTAL OR UNINTENTIONAL, INITIAL ENCOUNTER: Primary | ICD-10-CM

## 2021-08-31 DIAGNOSIS — T63.451A TOXIC REACTION TO HORNETS, WASPS AND BEES, ACCIDENTAL OR UNINTENTIONAL, INITIAL ENCOUNTER: Primary | ICD-10-CM

## 2021-08-31 LAB
BASOPHILS # BLD AUTO: 0 10E3/UL (ref 0–0.2)
BASOPHILS NFR BLD AUTO: 0 %
EOSINOPHIL # BLD AUTO: 0.3 10E3/UL (ref 0–0.7)
EOSINOPHIL NFR BLD AUTO: 4 %
ERYTHROCYTE [DISTWIDTH] IN BLOOD BY AUTOMATED COUNT: 13.5 % (ref 10–15)
HCT VFR BLD AUTO: 46 % (ref 40–53)
HGB BLD-MCNC: 14.8 G/DL (ref 13.3–17.7)
LYMPHOCYTES # BLD AUTO: 2.1 10E3/UL (ref 0.8–5.3)
LYMPHOCYTES NFR BLD AUTO: 29 %
MCH RBC QN AUTO: 27.6 PG (ref 26.5–33)
MCHC RBC AUTO-ENTMCNC: 32.2 G/DL (ref 31.5–36.5)
MCV RBC AUTO: 86 FL (ref 78–100)
MONOCYTES # BLD AUTO: 0.8 10E3/UL (ref 0–1.3)
MONOCYTES NFR BLD AUTO: 11 %
NEUTROPHILS # BLD AUTO: 4.2 10E3/UL (ref 1.6–8.3)
NEUTROPHILS NFR BLD AUTO: 57 %
PLATELET # BLD AUTO: 367 10E3/UL (ref 150–450)
RBC # BLD AUTO: 5.37 10E6/UL (ref 4.4–5.9)
WBC # BLD AUTO: 7.4 10E3/UL (ref 4–11)

## 2021-08-31 PROCEDURE — 82785 ASSAY OF IGE: CPT | Performed by: ALLERGY & IMMUNOLOGY

## 2021-08-31 PROCEDURE — 86003 ALLG SPEC IGE CRUDE XTRC EA: CPT | Performed by: ALLERGY & IMMUNOLOGY

## 2021-08-31 PROCEDURE — 99204 OFFICE O/P NEW MOD 45 MIN: CPT | Performed by: ALLERGY & IMMUNOLOGY

## 2021-08-31 PROCEDURE — 36415 COLL VENOUS BLD VENIPUNCTURE: CPT | Performed by: ALLERGY & IMMUNOLOGY

## 2021-08-31 PROCEDURE — 83520 IMMUNOASSAY QUANT NOS NONAB: CPT | Performed by: ALLERGY & IMMUNOLOGY

## 2021-08-31 PROCEDURE — 85025 COMPLETE CBC W/AUTO DIFF WBC: CPT | Performed by: ALLERGY & IMMUNOLOGY

## 2021-08-31 RX ORDER — EPINEPHRINE 0.3 MG/.3ML
0.3 INJECTION SUBCUTANEOUS
Qty: 0.6 ML | Refills: 3 | Status: SHIPPED | OUTPATIENT
Start: 2021-08-31 | End: 2023-10-05

## 2021-08-31 ASSESSMENT — ENCOUNTER SYMPTOMS
HEADACHES: 0
VOMITING: 0
CHEST TIGHTNESS: 0
ADENOPATHY: 0
EYE ITCHING: 0
ARTHRALGIAS: 0
EYE REDNESS: 0
MYALGIAS: 0
WHEEZING: 0
EYE DISCHARGE: 0
SINUS PRESSURE: 0
FEVER: 0
DIARRHEA: 0
FACIAL SWELLING: 0
COUGH: 0
NAUSEA: 0
ACTIVITY CHANGE: 0
FATIGUE: 0
JOINT SWELLING: 0
SHORTNESS OF BREATH: 1

## 2021-08-31 NOTE — PATIENT INSTRUCTIONS
Do not see a  contraindication to received COVID-19 vaccine.      Get the bloodwork done for venom allergy.    Stop Flovent.  Start Arnuity 200 mcg 1 puff inhaled daily. rinse/gargle/spit water after use.    -Use albuterol inhaler 2-4 puffs every 4 hours as needed for chest tightness/wheezing/shortness of breath/persistent cough.    Call to schedule pulmonary function test:    [phone]

## 2021-08-31 NOTE — LETTER
8/31/2021         RE: Daniele Carter  [address] [address]        Dear Colleague,    Thank you for referring your patient, Daniele Carter, to the [facility]. Please see a copy of my visit note below.    SUBJECTIVE:                                                                   Daniele Carter is a 46 year old male who presents today to our Allergy Clinic at [facility]; He is being seen in consultation at the request of Manuela Nicole CNP for evaluation if he should get COVID-19 vaccine.      The patient states that he is here because the provider wasn't sure whether she took you should get the COVID-19 vaccine because of his previous allergic reactions to venom.    He had several allergic reactions. Was seen in the ED at least twice for that.  I personally reviewed two ED visits from 2009 2013. In 2009, after being stung by multiple hornets, he became weak, shaky, and developed a slight swelling sensation in his throat, within 30 minutes.  In the ED, he was treated with steroids, H1 and H2 antihistamines, and epinephrine.    In 2013, he was stung by insects while mowing the lawn. Within an hour, he felt that his throat felt funny and dizzy. He also had some chest pain. Was treated with H1 and H2 antihistamines, saline bolus and methylprednisolone.    When he was a child, he was stung and had swollen face. Doesn't remember much from that reaction.  He has never seen an allergist before. Doesn't carry epinephrine with him all the time. He works in [employer], and it is not allowed to bring it in. He doesn't want to leave it in the car either.    Multiple years ago, he received a flu vaccine and felt sick. Had body aches and fever. No IgE mediated symptoms with flu vaccine. No other reactions with other vaccines.    Reports being diagnosed with asthma for multiple years. Typically, it is manifested by shortness of breath, chest  tightness, and sometimes dizziness. He may develop shortness of breath once or twice a week, sometimes more. Despite that, he uses albuterol inhaler once or twice a month. When he uses it, it helps within minutes.  He was prescribed Flovent in the past. He admits not using it all the time. May be every 2 to 3 days.        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     CARDIOVASCULAR SCREENING; LDL GOAL LESS THAN 160     Mild persistent asthma       Past Medical History:   Diagnosis Date     Esophageal reflux      Hyperlipidemia      Pain in joint, lower leg       Problem (# of Occurrences) Relation (Name,Age of Onset)    Alzheimer Disease (1) Mother    Cancer (1) Father: lymphoma; Sprayed with Ang[place] in [place]    Diabetes (2) Father: type 2, Maternal Grandfather    Heart Disease (1) Father    Lipids (1) Mother    No Known Problems (1) Brother (34): history of  heart problems        Past Surgical History:   Procedure Laterality Date     COLONOSCOPY  5/6/2011    Procedure:COLONOSCOPY; Surgeon:JONAS JAIN; Location:[facility]     ESOPHAGOSCOPY, GASTROSCOPY, DUODENOSCOPY (EGD), COMBINED N/A 3/28/2017    Procedure: COMBINED ESOPHAGOSCOPY, GASTROSCOPY, DUODENOSCOPY (EGD), BIOPSY SINGLE OR MULTIPLE;  Surgeon: Nikolai Valladares MD;  Location: [facility]     Social History     Socioeconomic History     Marital status:      Spouse name: None     Number of children: 1     Years of education: None     Highest education level: None   Occupational History     Occupation:      Employer: [employer]     Comment: [employer]   Tobacco Use     Smoking status: Never Smoker     Smokeless tobacco: Never Used   Substance and Sexual Activity     Alcohol use: Yes     Comment: weekends     Drug use: No     Sexual activity: Yes     Partners: Female   Other Topics Concern     Parent/sibling w/ CABG, MI or angioplasty before  65F 55M? No   Social History Narrative    August 31, 2021    ENVIRONMENTAL HISTORY: [place] The home is heated with a forced air. They do have central air conditioning. The patient's bedroom is furnished with carpeting in bedroom, fabric window coverings, and animals sleep in bedroom.  Pets inside the house include 2 dog(s). There is history of mice. There is/are 1 smokers in the house.  The house does have a damp basement.      Social Determinants of Health     Financial Resource Strain:      Difficulty of Paying Living Expenses:    Food Insecurity:      Worried About Running Out of Food in the Last Year:      Ran Out of Food in the Last Year:    Transportation Needs:      Lack of Transportation (Medical):      Lack of Transportation (Non-Medical):    Physical Activity:      Days of Exercise per Week:      Minutes of Exercise per Session:    Stress:      Feeling of Stress :    Social Connections:      Frequency of Communication with Friends and Family:      Frequency of Social Gatherings with Friends and Family:      Attends [employer] Services:      Active Member of Clubs or Organizations:      Attends Club or Organization Meetings:      Marital Status:    Intimate Partner Violence:      Fear of Current or Ex-Partner:      Emotionally Abused:      Physically Abused:      Sexually Abused:            Review of Systems   Constitutional: Negative for activity change, fatigue and fever.   HENT: Positive for ear pain. Negative for dental problem, facial swelling, nosebleeds, postnasal drip, sinus pressure and sneezing.    Eyes: Negative for discharge, redness and itching.   Respiratory: Positive for shortness of breath. Negative for cough, chest tightness and wheezing.    Cardiovascular: Negative for chest pain.   Gastrointestinal: Negative for diarrhea, nausea and vomiting.   Musculoskeletal: Negative for arthralgias, joint swelling and myalgias.   Skin: Negative for rash.    Neurological: Negative for headaches.   Hematological: Negative for adenopathy.   Psychiatric/Behavioral: Negative for behavioral problems and self-injury.           Current Outpatient Medications:      albuterol (PROAIR HFA/PROVENTIL HFA/VENTOLIN HFA) 108 (90 Base) MCG/ACT inhaler, Inhale 2 puffs into the lungs every 6 hours This is your rescue inhaler., Disp: 1 Inhaler, Rfl: 11     EPINEPHrine (ANY BX GENERIC EQUIV) 0.3 MG/0.3ML injection 2-pack, Inject 0.3 mLs (0.3 mg) into the muscle once as needed for anaphylaxis, Disp: 0.6 mL, Rfl: 3     fluticasone (ARNUITY ELLIPTA) 200 MCG/ACT inhaler, Inhale 1 puff into the lungs daily, Disp: 30 each, Rfl: 3     omeprazole (PRILOSEC) 40 MG DR capsule, Take 1 capsule by mouth once daily, Disp: 90 capsule, Rfl: 3     cyclobenzaprine (FLEXERIL) 10 MG tablet, Take 1 tablet (10 mg) by mouth 3 times daily as needed for muscle spasms (Patient not taking: Reported on 8/31/2021), Disp: 40 tablet, Rfl: 3     loratadine (CLARITIN) 10 MG tablet, Take 1 tablet (10 mg) by mouth daily as needed for allergies (Patient not taking: Reported on 8/31/2021), Disp: 90 tablet, Rfl: 3     triamcinolone (KENALOG) 0.1 % external cream, Apply topically 2 times daily (Patient not taking: Reported on 12/14/2020), Disp: 60 g, Rfl: 1  Immunization History   Administered Date(s) Administered     Mantoux Tuberculin Skin Test 12/17/2010     TD (ADULT, 7+) 09/01/2004     TDAP Vaccine (Adacel) 07/25/2017     Allergies   Allergen Reactions     Bee Anaphylaxis     Asa [Aspirin]      Unknown reaction- Hives???     Chocolate Flavor Hives     OBJECTIVE:                                                                 /81 (BP Location: Left arm, Patient Position: Sitting, Cuff Size: Adult Regular)   Pulse 80   Temp 97  F (36.1  C) (Tympanic)   Wt 86.7 kg (191 lb 2.2 oz)   SpO2 97%   BMI 28.23 kg/m          Physical Exam  Vitals and nursing note reviewed.   Constitutional:       General: He is not in  acute distress.     Appearance: He is not diaphoretic.   HENT:      Head: Normocephalic and atraumatic.      Right Ear: Tympanic membrane, ear canal and external ear normal.      Left Ear: Tympanic membrane, ear canal and external ear normal.      Nose: No mucosal edema or rhinorrhea.      Right Turbinates: Not enlarged, swollen or pale.      Left Turbinates: Not enlarged, swollen or pale.      Mouth/Throat:      Lips: Pink.      Mouth: Mucous membranes are moist.      Pharynx: Oropharynx is clear. No pharyngeal swelling, oropharyngeal exudate or posterior oropharyngeal erythema.   Eyes:      General:         Right eye: No discharge.         Left eye: No discharge.      Conjunctiva/sclera: Conjunctivae normal.   Pulmonary:      Effort: No respiratory distress.      Breath sounds: Normal breath sounds and air entry. No stridor, decreased air movement or transmitted upper airway sounds. No decreased breath sounds, wheezing, rhonchi or rales.   Musculoskeletal:         General: Normal range of motion.   Lymphadenopathy:      Cervical: No cervical adenopathy.   Skin:     Capillary Refill: Capillary refill takes less than 2 seconds.   Neurological:      Mental Status: He is alert and oriented to person, place, and time.   Psychiatric:         Mood and Affect: Mood normal.         Behavior: Behavior normal.             ASSESSMENT/PLAN:    COVID-19 virus vaccination not done  Individuals with common allergies to medications, foods, inhalants, insects, and latex are no more likely than the general public to have an allergic reaction to the mRNA COVID-19 vaccines.  Data related to risk in individuals with a history of allergic reactions to previous vaccinations (including allergy shots) and/or mast cell activation syndrome/idiopathic anaphylaxis is very limited and evolving.   At this point, I do not see any significant contraindications for the COVID-19 vaccine to be administered.  -Considering his history of reactions to  venom, I will order serum tryptase level. If it is within normal limits, he should be able to get the vaccine with PCP.    Toxic reaction to hornets, wasps and bees, accidental or unintentional, initial encounter  His EpiPen . I refilled it.  -Ordered serum IgE for venom panel. Ordered serum tryptase level to screen for systemic mastocytosis.  Depending on the results, consider venom immunotherapy or further skin testing at [facility].    - Allergen common wasp IgE  - Allergen honeybee IgE  - Allergen paper wasp IgE  - Allergen whiteface hornet IgE  - Allergen yellow hornet IgE  - Tryptase  - IgE  - EPINEPHrine (ANY BX GENERIC EQUIV) 0.3 MG/0.3ML injection 2-pack  Dispense: 0.6 mL; Refill: 3    Mild persistent asthma without complication    Suboptimally controlled considering that he has shortness of breath several times a week.  He is not consistent with Flovent. Doesn't use albuterol the time.  -I will order baseline PFT.  -Stop Flovent.  -Start Arnuity 200 mcg 1 puff once daily. Instructed to rinse/gargle/spit water after use.  -Use albuterol inhaler 2-4 puffs every 4 hours as needed for chest tightness/wheezing/shortness of breath/persistent cough.     - fluticasone (ARNUITY ELLIPTA) 200 MCG/ACT inhaler  Dispense: 30 each; Refill: 3  - CBC with Platelets & Differential  - General PFT Lab (Please always keep checked)  - Pulmonary Function Test  - CBC with Platelets & Differential       Return in about 2 months (around 10/31/2021), or if symptoms worsen or fail to improve.    Thank you for allowing us to participate in the care of this patient. Please feel free to contact us if there are any questions or concerns about the patient.    Disclaimer: This note consists of symbols derived from keyboarding, dictation and/or voice recognition software. As a result, there may be errors in the script that have gone undetected. Please consider this when interpreting information found in this chart.    Nishant  MD Francisco, FAAAAI, FACGAYI  Allergy, Asthma and Immunology    [facility]         Again, thank you for allowing me to participate in the care of your patient.        Sincerely,        Nishant Singh MD

## 2021-08-31 NOTE — PROGRESS NOTES
SUBJECTIVE:                                                                   Daniele Crater is a 46 year old male who presents today to our Allergy Clinic at [facility]; He is being seen in consultation at the request of Manuela Nicole CNP for evaluation if he should get COVID-19 vaccine.      The patient states that he is here because the provider wasn't sure whether she took you should get the COVID-19 vaccine because of his previous allergic reactions to venom.    He had several allergic reactions. Was seen in the ED at least twice for that.  I personally reviewed two ED visits from 2009 2013. In 2009, after being stung by multiple hornets, he became weak, shaky, and developed a slight swelling sensation in his throat, within 30 minutes.  In the ED, he was treated with steroids, H1 and H2 antihistamines, and epinephrine.    In 2013, he was stung by insects while mowing the lawn. Within an hour, he felt that his throat felt funny and dizzy. He also had some chest pain. Was treated with H1 and H2 antihistamines, saline bolus and methylprednisolone.    When he was a child, he was stung and had swollen face. Doesn't remember much from that reaction.  He has never seen an allergist before. Doesn't carry epinephrine with him all the time. He works in [employer], and it is not allowed to bring it in. He doesn't want to leave it in the car either.    Multiple years ago, he received a flu vaccine and felt sick. Had body aches and fever. No IgE mediated symptoms with flu vaccine. No other reactions with other vaccines.    Reports being diagnosed with asthma for multiple years. Typically, it is manifested by shortness of breath, chest tightness, and sometimes dizziness. He may develop shortness of breath once or twice a week, sometimes more. Despite that, he uses albuterol inhaler once or twice a month. When he uses it, it helps within minutes.  He was prescribed Flovent in the past. He  admits not using it all the time. May be every 2 to 3 days.        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     CARDIOVASCULAR SCREENING; LDL GOAL LESS THAN 160     Mild persistent asthma       Past Medical History:   Diagnosis Date     Esophageal reflux      Hyperlipidemia      Pain in joint, lower leg       Problem (# of Occurrences) Relation (Name,Age of Onset)    Alzheimer Disease (1) Mother    Cancer (1) Father: lymphoma; Sprayed with Angent [place] in [place]    Diabetes (2) Father: type 2, Maternal Grandfather    Heart Disease (1) Father    Lipids (1) Mother    No Known Problems (1) Brother (34): history of  heart problems        Past Surgical History:   Procedure Laterality Date     COLONOSCOPY  5/6/2011    Procedure:COLONOSCOPY; Surgeon:JONAS JAIN; Location:[facility] [facility]     ESOPHAGOSCOPY, GASTROSCOPY, DUODENOSCOPY (EGD), COMBINED N/A 3/28/2017    Procedure: COMBINED ESOPHAGOSCOPY, GASTROSCOPY, DUODENOSCOPY (EGD), BIOPSY SINGLE OR MULTIPLE;  Surgeon: Nikolai Valladares MD;  Location: [facility]     Social History     Socioeconomic History     Marital status:      Spouse name: None     Number of children: 1     Years of education: None     Highest education level: None   Occupational History     Occupation:      Employer: [employer]     Comment: [employer]   Tobacco Use     Smoking status: Never Smoker     Smokeless tobacco: Never Used   Substance and Sexual Activity     Alcohol use: Yes     Comment: weekends     Drug use: No     Sexual activity: Yes     Partners: Female   Other Topics Concern     Parent/sibling w/ CABG, MI or angioplasty before 65F 55M? No   Social History Narrative    August 31, 2021    ENVIRONMENTAL HISTORY: [place] The home is heated with a forced air. They do have central air conditioning. The patient's bedroom is furnished with  carpeting in bedroom, fabric window coverings, and animals sleep in bedroom.  Pets inside the house include 2 dog(s). There is history of mice. There is/are 1 smokers in the house.  The house does have a damp basement.      Social Determinants of Health     Financial Resource Strain:      Difficulty of Paying Living Expenses:    Food Insecurity:      Worried About Running Out of Food in the Last Year:      Ran Out of Food in the Last Year:    Transportation Needs:      Lack of Transportation (Medical):      Lack of Transportation (Non-Medical):    Physical Activity:      Days of Exercise per Week:      Minutes of Exercise per Session:    Stress:      Feeling of Stress :    Social Connections:      Frequency of Communication with Friends and Family:      Frequency of Social Gatherings with Friends and Family:      Attends Zoroastrian Services:      Active Member of Clubs or Organizations:      Attends Club or Organization Meetings:      Marital Status:    Intimate Partner Violence:      Fear of Current or Ex-Partner:      Emotionally Abused:      Physically Abused:      Sexually Abused:            Review of Systems   Constitutional: Negative for activity change, fatigue and fever.   HENT: Positive for ear pain. Negative for dental problem, facial swelling, nosebleeds, postnasal drip, sinus pressure and sneezing.    Eyes: Negative for discharge, redness and itching.   Respiratory: Positive for shortness of breath. Negative for cough, chest tightness and wheezing.    Cardiovascular: Negative for chest pain.   Gastrointestinal: Negative for diarrhea, nausea and vomiting.   Musculoskeletal: Negative for arthralgias, joint swelling and myalgias.   Skin: Negative for rash.   Neurological: Negative for headaches.   Hematological: Negative for adenopathy.   Psychiatric/Behavioral: Negative for behavioral problems and self-injury.           Current Outpatient Medications:      albuterol (PROAIR HFA/PROVENTIL HFA/VENTOLIN HFA)  108 (90 Base) MCG/ACT inhaler, Inhale 2 puffs into the lungs every 6 hours This is your rescue inhaler., Disp: 1 Inhaler, Rfl: 11     EPINEPHrine (ANY BX GENERIC EQUIV) 0.3 MG/0.3ML injection 2-pack, Inject 0.3 mLs (0.3 mg) into the muscle once as needed for anaphylaxis, Disp: 0.6 mL, Rfl: 3     fluticasone (ARNUITY ELLIPTA) 200 MCG/ACT inhaler, Inhale 1 puff into the lungs daily, Disp: 30 each, Rfl: 3     omeprazole (PRILOSEC) 40 MG DR capsule, Take 1 capsule by mouth once daily, Disp: 90 capsule, Rfl: 3     cyclobenzaprine (FLEXERIL) 10 MG tablet, Take 1 tablet (10 mg) by mouth 3 times daily as needed for muscle spasms (Patient not taking: Reported on 8/31/2021), Disp: 40 tablet, Rfl: 3     loratadine (CLARITIN) 10 MG tablet, Take 1 tablet (10 mg) by mouth daily as needed for allergies (Patient not taking: Reported on 8/31/2021), Disp: 90 tablet, Rfl: 3     triamcinolone (KENALOG) 0.1 % external cream, Apply topically 2 times daily (Patient not taking: Reported on 12/14/2020), Disp: 60 g, Rfl: 1  Immunization History   Administered Date(s) Administered     Mantoux Tuberculin Skin Test 12/17/2010     TD (ADULT, 7+) 09/01/2004     TDAP Vaccine (Adacel) 07/25/2017     Allergies   Allergen Reactions     Bee Anaphylaxis     Asa [Aspirin]      Unknown reaction- Hives???     Chocolate Flavor Hives     OBJECTIVE:                                                                 /81 (BP Location: Left arm, Patient Position: Sitting, Cuff Size: Adult Regular)   Pulse 80   Temp 97  F (36.1  C) (Tympanic)   Wt 86.7 kg (191 lb 2.2 oz)   SpO2 97%   BMI 28.23 kg/m          Physical Exam  Vitals and nursing note reviewed.   Constitutional:       General: He is not in acute distress.     Appearance: He is not diaphoretic.   HENT:      Head: Normocephalic and atraumatic.      Right Ear: Tympanic membrane, ear canal and external ear normal.      Left Ear: Tympanic membrane, ear canal and external ear normal.      Nose: No  mucosal edema or rhinorrhea.      Right Turbinates: Not enlarged, swollen or pale.      Left Turbinates: Not enlarged, swollen or pale.      Mouth/Throat:      Lips: Pink.      Mouth: Mucous membranes are moist.      Pharynx: Oropharynx is clear. No pharyngeal swelling, oropharyngeal exudate or posterior oropharyngeal erythema.   Eyes:      General:         Right eye: No discharge.         Left eye: No discharge.      Conjunctiva/sclera: Conjunctivae normal.   Pulmonary:      Effort: No respiratory distress.      Breath sounds: Normal breath sounds and air entry. No stridor, decreased air movement or transmitted upper airway sounds. No decreased breath sounds, wheezing, rhonchi or rales.   Musculoskeletal:         General: Normal range of motion.   Lymphadenopathy:      Cervical: No cervical adenopathy.   Skin:     Capillary Refill: Capillary refill takes less than 2 seconds.   Neurological:      Mental Status: He is alert and oriented to person, place, and time.   Psychiatric:         Mood and Affect: Mood normal.         Behavior: Behavior normal.             ASSESSMENT/PLAN:    COVID-19 virus vaccination not done  Individuals with common allergies to medications, foods, inhalants, insects, and latex are no more likely than the general public to have an allergic reaction to the mRNA COVID-19 vaccines.  Data related to risk in individuals with a history of allergic reactions to previous vaccinations (including allergy shots) and/or mast cell activation syndrome/idiopathic anaphylaxis is very limited and evolving.   At this point, I do not see any significant contraindications for the COVID-19 vaccine to be administered.  -Considering his history of reactions to venom, I will order serum tryptase level. If it is within normal limits, he should be able to get the vaccine with PCP.    Toxic reaction to hornets, wasps and bees, accidental or unintentional, initial encounter  His EpiPen . I refilled it.  -Ordered  serum IgE for venom panel. Ordered serum tryptase level to screen for systemic mastocytosis.  Depending on the results, consider venom immunotherapy or further skin [facility] at [facility].    - Allergen common wasp IgE  - Allergen honeybee IgE  - Allergen paper wasp IgE  - Allergen whiteface hornet IgE  - Allergen yellow hornet IgE  - Tryptase  - IgE  - EPINEPHrine (ANY BX GENERIC EQUIV) 0.3 MG/0.3ML injection 2-pack  Dispense: 0.6 mL; Refill: 3    Mild persistent asthma without complication    Suboptimally controlled considering that he has shortness of breath several times a week.  He is not consistent with Flovent. Doesn't use albuterol the time.  -I will order baseline PFT.  -Stop Flovent.  -Start Arnuity 200 mcg 1 puff once daily. Instructed to rinse/gargle/spit water after use.  -Use albuterol inhaler 2-4 puffs every 4 hours as needed for chest tightness/wheezing/shortness of breath/persistent cough.     - fluticasone (ARNUITY ELLIPTA) 200 MCG/ACT inhaler  Dispense: 30 each; Refill: 3  - CBC with Platelets & Differential  - General PFT Lab (Please always keep checked)  - Pulmonary Function Test  - CBC with Platelets & Differential       Return in about 2 months (around 10/31/2021), or if symptoms worsen or fail to improve.    Thank you for allowing us to participate in the care of this patient. Please feel free to contact us if there are any questions or concerns about the patient.    Disclaimer: This note consists of symbols derived from keyboarding, dictation and/or voice recognition software. As a result, there may be errors in the script that have gone undetected. Please consider this when interpreting information found in this chart.    Nishant Singh MD, FAAAAI, FACAAI  Allergy, Asthma and Immunology    [facility]

## 2021-09-01 LAB
HONEY BEE IGE QN: <0.1 KU(A)/L
IGE SERPL-ACNC: 596 KU/L (ref 0–114)
PAPER WASP IGE QN: 0.42 KU(A)/L
WHITEFACED HORNET IGE QN: 0.92 KU(A)/L
YELLOW HORNET IGE QN: 0.13 KU(A)/L
YELLOW JACKET IGE QN: 1.71 KU(A)/L

## 2021-09-01 ASSESSMENT — ASTHMA QUESTIONNAIRES: ACT_TOTALSCORE: 19

## 2021-09-02 ENCOUNTER — TELEPHONE (OUTPATIENT)
Dept: FAMILY MEDICINE | Facility: CLINIC | Age: 46
End: 2021-09-02

## 2021-09-02 NOTE — TELEPHONE ENCOUNTER
Reason for Call:  Request for results:    Name of test or procedure: Allergy test lab results    Date of test of procedure: 8/31/2021    Location of the test or procedure: [place]    OK to leave the result message on voice mail or with a family member? YES    Phone number Patient can be reached at:  Cell number on file:    Telephone Information:   Mobile [phone]       Additional comments: Patient works night shift.    Call taken on 9/2/2021 at 8:54 AM by Ailyn Santos

## 2021-09-02 NOTE — TELEPHONE ENCOUNTER
Left message on answering machine for patient to call back.    Left detailed that some results are still in process and as soon as they are all in then Dr Singh will send him his interpretation along with plan of care.     Indy HIGHTOWER RN  Specialty/Allergy Clinics

## 2021-09-03 LAB — TRYPTASE SERPL-MCNC: 10 UG/L

## 2021-09-07 NOTE — RESULT ENCOUNTER NOTE
[employer] message sent:     CBC with differential is within normal limits.  Elevated total serum IgE, which is not uncommon for the patients with allergic rhinitis, asthma, food allergies, and/or eczema.    Serum tryptase level is within normal limits, arguing against systemic mastocytosis.  Although, it is on a higher side of the normal limit, explaining the severity of the symptoms.      Positive serum IgE for venom of hornets, paper wasp, and yellowjacket.  Negative serum IgE for honeybee venom.  -Suggest skin test for honeybee venom.  The family should let me know if they are ready for the skin test, and I will need to talk to Dr. Ruiz to arrange that.   After having the results of the skin test, suggest discussing venom immunotherapy.

## 2021-09-12 ENCOUNTER — HEALTH MAINTENANCE LETTER (OUTPATIENT)
Age: 46
End: 2021-09-12

## 2021-09-13 ENCOUNTER — OFFICE VISIT (OUTPATIENT)
Dept: URGENT CARE | Facility: URGENT CARE | Age: 46
End: 2021-09-13
Payer: COMMERCIAL

## 2021-09-13 VITALS
RESPIRATION RATE: 16 BRPM | DIASTOLIC BLOOD PRESSURE: 78 MMHG | HEIGHT: 69 IN | HEART RATE: 87 BPM | OXYGEN SATURATION: 97 % | TEMPERATURE: 97.9 F | SYSTOLIC BLOOD PRESSURE: 148 MMHG | BODY MASS INDEX: 27.4 KG/M2 | WEIGHT: 185 LBS

## 2021-09-13 DIAGNOSIS — M70.21 OLECRANON BURSITIS OF RIGHT ELBOW: Primary | ICD-10-CM

## 2021-09-13 PROCEDURE — 99214 OFFICE O/P EST MOD 30 MIN: CPT | Performed by: PHYSICIAN ASSISTANT

## 2021-09-13 RX ORDER — CEPHALEXIN 500 MG/1
500 CAPSULE ORAL 4 TIMES DAILY
Qty: 40 CAPSULE | Refills: 0 | Status: ON HOLD | OUTPATIENT
Start: 2021-09-13 | End: 2021-09-20

## 2021-09-13 RX ORDER — NAPROXEN 500 MG/1
500 TABLET ORAL 2 TIMES DAILY WITH MEALS
Qty: 60 TABLET | Refills: 1 | Status: SHIPPED | OUTPATIENT
Start: 2021-09-13 | End: 2022-11-29

## 2021-09-13 ASSESSMENT — PAIN SCALES - GENERAL: PAINLEVEL: SEVERE PAIN (7)

## 2021-09-13 ASSESSMENT — MIFFLIN-ST. JEOR: SCORE: 1719.53

## 2021-09-13 NOTE — LETTER
[facility] U[facility] [place] [id number] [address]  Phone: [phone]  Fax: [phone]    September 13, 2021        Daniele Carter  [address] [address]          To whom it may concern:    RE: Daniele Carter    Patient was seen and treated today at our clinic and missed work 09/13/21 through 09/17/21    Please contact me for questions or concerns.      Sincerely,        Dalia Mcleod PA-C

## 2021-09-13 NOTE — PROGRESS NOTES
"    Assessment & Plan     Olecranon bursitis of right elbow  Will treat with cephALEXin (KEFLEX) 500 MG capsule; Take 1 capsule (500 mg) by mouth 4 times daily for 10 days and naproxen (NAPROSYN) 500 MG tablet; Take 1 tablet (500 mg) by mouth 2 times daily (with meals). Discussed in detail symptoms that would warrant emergent evaluation in the ED. Patient agrees with plan and will follow up with ortho this week.       - Orthopedic  Referral; Future             BMI:   Estimated body mass index is 27.32 kg/m  as calculated from the following:    Height as of this encounter: 1.753 m (5' 9\").    Weight as of this encounter: 83.9 kg (185 lb).           Return in about 2 days (around 9/15/2021), or if symptoms worsen or fail to improve.    Dalia Mcleod PA-C  [facility]RG[facility] [place]            Subjective   Chief Complaint   Patient presents with     Musculoskeletal Problem     9/12/21 Patient is here with right elbow swelling, pain goes up from finger to shoulder across his chest and redness. Yesterday was tearing out old carpet from a camper. Taking ibuprofen helps a little bit.       HPI     Right elbow swelling     Onset of symptoms was 1 day(s) ago.  Course of illness is worsening.    Severity moderate  Current and Associated symptoms: right elbow redness, pain, and swelling   Treatment measures tried include Tylenol/Ibuprofen.  Predisposing factors include None.              Review of Systems   Constitutional, HEENT, cardiovascular, pulmonary, gi and gu systems are negative, except as otherwise noted.      Objective    BP (!) 148/78 (BP Location: Left arm, Patient Position: Sitting, Cuff Size: Adult Regular)   Pulse 87   Temp 97.9  F (36.6  C) (Tympanic)   Resp 16   Ht 1.753 m (5' 9\")   Wt 83.9 kg (185 lb)   SpO2 97%   BMI 27.32 kg/m    Body mass index is 27.32 kg/m .  Physical Exam  Constitutional:       General: He is not in acute distress.  Musculoskeletal:      Comments: " Right elbow has swelling, redness, warmth, and tenderness with palpation. There is a small scab over elbow but no drainage/discharge. Painful active ROM.    Neurological:      Mental Status: He is alert.

## 2021-09-14 ENCOUNTER — OFFICE VISIT (OUTPATIENT)
Dept: ORTHOPEDICS | Facility: CLINIC | Age: 46
End: 2021-09-14
Payer: COMMERCIAL

## 2021-09-14 ENCOUNTER — APPOINTMENT (OUTPATIENT)
Dept: LAB | Facility: CLINIC | Age: 46
End: 2021-09-14
Payer: COMMERCIAL

## 2021-09-14 VITALS
TEMPERATURE: 99.1 F | SYSTOLIC BLOOD PRESSURE: 112 MMHG | DIASTOLIC BLOOD PRESSURE: 70 MMHG | BODY MASS INDEX: 27.4 KG/M2 | WEIGHT: 185 LBS | HEIGHT: 69 IN

## 2021-09-14 DIAGNOSIS — M71.121 SEPTIC OLECRANON BURSITIS OF RIGHT ELBOW: ICD-10-CM

## 2021-09-14 DIAGNOSIS — M71.121 SEPTIC BURSITIS OF ELBOW, RIGHT: Primary | ICD-10-CM

## 2021-09-14 PROCEDURE — 87205 SMEAR GRAM STAIN: CPT | Performed by: FAMILY MEDICINE

## 2021-09-14 PROCEDURE — 87070 CULTURE OTHR SPECIMN AEROBIC: CPT | Performed by: FAMILY MEDICINE

## 2021-09-14 PROCEDURE — 87186 SC STD MICRODIL/AGAR DIL: CPT | Performed by: FAMILY MEDICINE

## 2021-09-14 PROCEDURE — 20606 DRAIN/INJ JOINT/BURSA W/US: CPT | Mod: RT | Performed by: FAMILY MEDICINE

## 2021-09-14 PROCEDURE — 87015 SPECIMEN INFECT AGNT CONCNTJ: CPT | Performed by: FAMILY MEDICINE

## 2021-09-14 PROCEDURE — 99204 OFFICE O/P NEW MOD 45 MIN: CPT | Performed by: PEDIATRICS

## 2021-09-14 PROCEDURE — 87077 CULTURE AEROBIC IDENTIFY: CPT | Performed by: FAMILY MEDICINE

## 2021-09-14 RX ORDER — HYDROCODONE BITARTRATE AND ACETAMINOPHEN 5; 325 MG/1; MG/1
1 TABLET ORAL EVERY 6 HOURS PRN
Qty: 10 TABLET | Refills: 0 | Status: SHIPPED | OUTPATIENT
Start: 2021-09-14 | End: 2021-11-17

## 2021-09-14 ASSESSMENT — MIFFLIN-ST. JEOR: SCORE: 1719.53

## 2021-09-14 NOTE — LETTER
9/14/2021         RE: Daniele Carter  [address] [address]        Dear Colleague,    Thank you for referring your patient, Daniele Carter, to the [facility]. Please see a copy of my visit note below.    Medium Joint Injection/Arthrocentesis: R olecranon bursa    Date/Time: 9/14/2021 11:34 AM  Performed by: Alvarez Wolff MD  Authorized by: Alvarez Woflf MD     Indications:  Pain  Needle Size:  18 G  Guidance: ultrasound    Approach:  Posterior  Location:  Elbow  Site:  R olecranon bursa  Aspirate amount (mL):  6  Aspirate:  Yellow, cloudy and blood-tinged  Outcome:  Tolerated well, no immediate complications  Procedure discussed: discussed risks, benefits, and alternatives    Consent Given by:  Patient  Timeout: timeout called immediately prior to procedure    Prep: patient was prepped and draped in usual sterile fashion     Referred by Dr. Ha     Patient reported some improvement of pain after right elbow olecranon bursa aspiration.  Area was demarcated with marker.  Aftercare instructions given to patient.  Plan to follow-up as previously discussed with referring provider.     Alvarez Wolff MD [facility]                Again, thank you for allowing me to participate in the care of your patient.        Sincerely,        Alvarez Wolff MD

## 2021-09-14 NOTE — PROGRESS NOTES
Medium Joint Injection/Arthrocentesis: R olecranon bursa    Date/Time: 9/14/2021 11:34 AM  Performed by: Alvarez Wolff MD  Authorized by: Alvarez Wolff MD     Indications:  Pain  Needle Size:  18 G  Guidance: ultrasound    Approach:  Posterior  Location:  Elbow  Site:  R olecranon bursa  Aspirate amount (mL):  6  Aspirate:  Yellow, cloudy and blood-tinged  Outcome:  Tolerated well, no immediate complications  Procedure discussed: discussed risks, benefits, and alternatives    Consent Given by:  Patient  Timeout: timeout called immediately prior to procedure    Prep: patient was prepped and draped in usual sterile fashion     Referred by Dr. Ha     Patient reported some improvement of pain after right elbow olecranon bursa aspiration.  Area was demarcated with marker.  Aftercare instructions given to patient.  Plan to follow-up as previously discussed with referring provider.     Alvarez Wolff MD [facility]

## 2021-09-14 NOTE — PROGRESS NOTES
ASSESSMENT & PLAN    Daniele was seen today for pain.    Diagnoses and all orders for this visit:    Septic olecranon bursitis of right elbow  -     Orthopedic  Referral  -     HYDROcodone-acetaminophen (NORCO) 5-325 MG tablet; Take 1 tablet by mouth every 6 hours as needed for severe pain      This issue is acute and Worsening.  Clinically, at least consistent with septic bursitis, with possible overlying cellulitis as well.  Dr Wolff was available in clinic today, we discussed the case, and he and agreed to aspirate the elbow under ultrasound guidance.  Fluid was sent off to laboratory for analysis.  In the meantime, we discussed continuing with the current antibiotic.  Potential for change or addition for greater skin binh coverage, but given on antibiotic less than 1 day, will hold with any changes for now.  Requested patient contact clinic in the morning with an update.  Otherwise, plan to follow-up in clinic approximately 1 week, sooner if needed.  Discussed red flag symptoms, and he may need to be seen urgently or emergently if worsening infection.  Questions answered. Discussed signs and symptoms that may indicate more serious issues; the patient was instructed to seek appropriate care if noted. Daniele indicates understanding of these issues and agrees with the plan.        See Patient Instructions  Patient Instructions   We discussed the elbow infection  Continue with current antibiotic  Elevate for comfort  May do cool or warm packs for comfort  May use compression for comfort  Plan off work for 10 days; completed paperwork for leave from work  Provided Norco (pain medication) for comfort; use over the counter medication or the prescribed naproxen first line if able  Call the clinic with an update in the morning, as I'll be interested to hear how you're doing; contact the clinic sooner if any questions/concerns  We will contact you with the results of the lab studies on the fluid drained  today  Tentatively follow up in approximately 1 week, or prior to discontinuing the antibiotic, sooner if needed    If any concerns about a worsening infection (e.g., spreading redness, fevers/chills, sudden increase in pain or changing pain), seek care accordingly; this likely would be going to the emergency department    If you have any further questions for your physician or physician s care team you can call [phone] and use option 3 to leave a voice message. Calls received during business hours will be returned same day.        Sven Ha [facility]      CC: Dalia Mcleod      -----  Chief Complaint   Patient presents with     Right Elbow - Pain       SUBJECTIVE  Daniele Carter is a/an 46 year old male who is seen in consultation at the request of  Dalia Mcleod PA-C for evaluation of right elbow pain.  Unknown if he hit his elbow when he was removing the kayy of an old camper (2 days ago), but he noticed pain in his right elbow about 2 days ago. 9/12/21  He was seen at  and given antibiotics.   Denies fever or night sweats.  Has noticed chills, but he feels this may be due to the use of an ice pack.  Small scab over the elbow, but he states has not noticed any drainage.  Redness and warmth around the elbow. .     The patient is seen by themselves.  The patient is Right handed    Onset: 2 day(s) ago. Reports insidious onset without acute precipitating event.  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     **  Noted some benefit from naproxen.      REVIEW OF SYSTEMS:  Review of Systems   All other systems reviewed and are negative.        OBJECTIVE:  /70   " Temp 99.1  F (37.3  C)   Ht 1.753 m (5' 9\")   Wt 83.9 kg (185 lb)   BMI 27.32 kg/m     General: healthy, alert and in no distress  HEENT: no scleral icterus or conjunctival erythema  Skin: No jaundice.  CV: distal perfusion intact   Resp: normal respiratory effort without conversational dyspnea   Psych: normal mood and affect  Gait: normal steady gait with appropriate coordination and balance   Neuro: Normal light sensory exam of upper extremity         Right Elbow exam:    Inspection:     Moderate to prominent swelling over the posterior elbow, and into the ulnar aspect of the forearm  Somewhat smaller but still prominent area of erythema in this area of swelling as well  Central, small scab overlying the posterior aspect of the ulna, centrally within the area of the erythema    ROM: Quite limited actively, due to pain    Strength: Deferred    Tender: Diffuse over the area of swelling and erythema, including over the olecranon    Sensation: Grossly intact light touch        RADIOLOGY:  None today      Review of prior external note(s) from - referring  Prescription drug management  32 minutes spent on the date of the encounter doing chart review, history and exam, documentation and further activities per the note         "

## 2021-09-14 NOTE — PATIENT INSTRUCTIONS
We discussed the elbow infection  Continue with current antibiotic  Elevate for comfort  May do cool or warm packs for comfort  May use compression for comfort  Plan off work for 10 days; completed paperwork for leave from work  Provided Norco (pain medication) for comfort; use over the counter medication or the prescribed naproxen first line if able  Call the clinic with an update in the morning, as I'll be interested to hear how you're doing; contact the clinic sooner if any questions/concerns  We will contact you with the results of the lab studies on the fluid drained today  Tentatively follow up in approximately 1 week, or prior to discontinuing the antibiotic, sooner if needed    If any concerns about a worsening infection (e.g., spreading redness, fevers/chills, sudden increase in pain or changing pain), seek care accordingly; this likely would be going to the emergency department    If you have any further questions for your physician or physician s care team you can call [phone] and use option 3 to leave a voice message. Calls received during business hours will be returned same day.

## 2021-09-14 NOTE — LETTER
9/14/2021         RE: Daniele Carter  [address] [place] [id number]        Dear Colleague,    Thank you for referring your patient, Daniele Carter, to the [facility] [place]. Please see a copy of my visit note below.    ASSESSMENT & PLAN    Daniele was seen today for pain.    Diagnoses and all orders for this visit:    Septic olecranon bursitis of right elbow  -     Orthopedic  Referral  -     HYDROcodone-acetaminophen (NORCO) 5-325 MG tablet; Take 1 tablet by mouth every 6 hours as needed for severe pain      This issue is acute and Worsening.  Clinically, at least consistent with septic bursitis, with possible overlying cellulitis as well.  Dr Wolff was available in clinic today, we discussed the case, and he and agreed to aspirate the elbow under ultrasound guidance.  Fluid was sent off to laboratory for analysis.  In the meantime, we discussed continuing with the current antibiotic.  Potential for change or addition for greater skin binh coverage, but given on antibiotic less than 1 day, will hold with any changes for now.  Requested patient contact clinic in the morning with an update.  Otherwise, plan to follow-up in clinic approximately 1 week, sooner if needed.  Discussed red flag symptoms, and he may need to be seen urgently or emergently if worsening infection.  Questions answered. Discussed signs and symptoms that may indicate more serious issues; the patient was instructed to seek appropriate care if noted. Daniele indicates understanding of these issues and agrees with the plan.        See Patient Instructions  Patient Instructions   We discussed the elbow infection  Continue with current antibiotic  Elevate for comfort  May do cool or warm packs for comfort  May use compression for comfort  Plan off work for 10 days; completed paperwork for leave from work  Provided Norco (pain medication) for comfort; use over the counter medication or the  prescribed naproxen first line if able  Call the clinic with an update in the morning, as I'll be interested to hear how you're doing; contact the clinic sooner if any questions/concerns  We will contact you with the results of the lab studies on the fluid drained today  Tentatively follow up in approximately 1 week, or prior to discontinuing the antibiotic, sooner if needed    If any concerns about a worsening infection (e.g., spreading redness, fevers/chills, sudden increase in pain or changing pain), seek care accordingly; this likely would be going to the emergency department    If you have any further questions for your physician or physician s care team you can call [phone] and use option 3 to leave a voice message. Calls received during business hours will be returned same day.        Sven Ha [facility]      CC: Dalia Mcleod      -----  Chief Complaint   Patient presents with     Right Elbow - Pain       SUBJECTIVE  Daniele Carter is a/an 46 year old male who is seen in consultation at the request of  Dalia Mcleod PA-C for evaluation of right elbow pain.  Unknown if he hit his elbow when he was removing the kayy of an old camper (2 days ago), but he noticed pain in his right elbow about 2 days ago. 9/12/21  He was seen at  and given antibiotics.   Denies fever or night sweats.  Has noticed chills, but he feels this may be due to the use of an ice pack.  Small scab over the elbow, but he states has not noticed any drainage.  Redness and warmth around the elbow. .     The patient is seen by themselves.  The patient is Right handed    Onset: 2 day(s) ago. Reports insidious onset without acute precipitating event.  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     **  Noted some benefit from naproxen.      REVIEW OF SYSTEMS:  Review of Systems   All other systems reviewed and are negative.        OBJECTIVE:  /70   Temp 99.1  F (37.3  C)   Ht 1.753 m (5' 9\")   Wt 83.9 kg (185 lb)   BMI 27.32 kg/m     General: healthy, alert and in no distress  HEENT: no scleral icterus or conjunctival erythema  Skin: No jaundice.  CV: distal perfusion intact   Resp: normal respiratory effort without conversational dyspnea   Psych: normal mood and affect  Gait: normal steady gait with appropriate coordination and balance   Neuro: Normal light sensory exam of upper extremity         Right Elbow exam:    Inspection:     Moderate to prominent swelling over the posterior elbow, and into the ulnar aspect of the forearm  Somewhat smaller but still prominent area of erythema in this area of swelling as well  Central, small scab overlying the posterior aspect of the ulna, centrally within the area of the erythema    ROM: Quite limited actively, due to pain    Strength: Deferred    Tender: Diffuse over the area of swelling and erythema, including over the olecranon    Sensation: Grossly intact light touch        RADIOLOGY:  None today      Review of prior external note(s) from - referring  Prescription drug management  32 minutes spent on the date of the encounter doing chart review, history and exam, documentation and further activities per the note             Again, thank you for allowing me to participate in the care of your patient.        Sincerely,        Sven Ha, DO    "

## 2021-09-15 ENCOUNTER — HOSPITAL ENCOUNTER (INPATIENT)
Facility: CLINIC | Age: 46
LOS: 4 days | Discharge: HOME | DRG: 501 | End: 2021-09-20
Attending: EMERGENCY MEDICINE | Admitting: FAMILY MEDICINE
Payer: COMMERCIAL

## 2021-09-15 DIAGNOSIS — Z11.52 ENCOUNTER FOR SCREENING LABORATORY TESTING FOR SEVERE ACUTE RESPIRATORY SYNDROME CORONAVIRUS 2 (SARS-COV-2): ICD-10-CM

## 2021-09-15 DIAGNOSIS — M71.121 SEPTIC OLECRANON BURSITIS OF RIGHT ELBOW: ICD-10-CM

## 2021-09-15 DIAGNOSIS — M71.121 SEPTIC BURSITIS OF ELBOW, RIGHT: Primary | ICD-10-CM

## 2021-09-15 LAB
ANION GAP SERPL CALCULATED.3IONS-SCNC: 5 MMOL/L (ref 3–14)
BASOPHILS # BLD AUTO: 0 10E3/UL (ref 0–0.2)
BASOPHILS NFR BLD AUTO: 0 %
BUN SERPL-MCNC: 10 MG/DL (ref 7–30)
CALCIUM SERPL-MCNC: 8.4 MG/DL (ref 8.5–10.1)
CHLORIDE BLD-SCNC: 104 MMOL/L (ref 94–109)
CO2 SERPL-SCNC: 28 MMOL/L (ref 20–32)
CREAT SERPL-MCNC: 0.78 MG/DL (ref 0.66–1.25)
CRP SERPL-MCNC: 112 MG/L (ref 0–8)
EOSINOPHIL # BLD AUTO: 0 10E3/UL (ref 0–0.7)
EOSINOPHIL NFR BLD AUTO: 0 %
ERYTHROCYTE [DISTWIDTH] IN BLOOD BY AUTOMATED COUNT: 13 % (ref 10–15)
GFR SERPL CREATININE-BSD FRML MDRD: >90 ML/MIN/1.73M2
GLUCOSE BLD-MCNC: 130 MG/DL (ref 70–99)
GRAM STAIN RESULT: ABNORMAL
GRAM STAIN RESULT: ABNORMAL
HCT VFR BLD AUTO: 43.8 % (ref 40–53)
HGB BLD-MCNC: 14.2 G/DL (ref 13.3–17.7)
HOLD SPECIMEN: NORMAL
IMM GRANULOCYTES # BLD: 0.1 10E3/UL
IMM GRANULOCYTES NFR BLD: 1 %
LACTATE SERPL-SCNC: 0.6 MMOL/L (ref 0.7–2)
LACTATE SERPL-SCNC: 1 MMOL/L (ref 0.7–2)
LYMPHOCYTES # BLD AUTO: 1.5 10E3/UL (ref 0.8–5.3)
LYMPHOCYTES NFR BLD AUTO: 10 %
MCH RBC QN AUTO: 27.5 PG (ref 26.5–33)
MCHC RBC AUTO-ENTMCNC: 32.4 G/DL (ref 31.5–36.5)
MCV RBC AUTO: 85 FL (ref 78–100)
MONOCYTES # BLD AUTO: 1.1 10E3/UL (ref 0–1.3)
MONOCYTES NFR BLD AUTO: 7 %
NEUTROPHILS # BLD AUTO: 13.4 10E3/UL (ref 1.6–8.3)
NEUTROPHILS NFR BLD AUTO: 82 %
NRBC # BLD AUTO: 0 10E3/UL
NRBC BLD AUTO-RTO: 0 /100
PLATELET # BLD AUTO: 293 10E3/UL (ref 150–450)
POTASSIUM BLD-SCNC: 3.2 MMOL/L (ref 3.4–5.3)
POTASSIUM BLD-SCNC: 3.3 MMOL/L (ref 3.4–5.3)
POTASSIUM BLD-SCNC: 3.8 MMOL/L (ref 3.4–5.3)
RBC # BLD AUTO: 5.17 10E6/UL (ref 4.4–5.9)
SARS-COV-2 RNA RESP QL NAA+PROBE: NEGATIVE
SODIUM SERPL-SCNC: 137 MMOL/L (ref 133–144)
WBC # BLD AUTO: 16.1 10E3/UL (ref 4–11)

## 2021-09-15 PROCEDURE — 250N000011 HC RX IP 250 OP 636: Performed by: PHYSICIAN ASSISTANT

## 2021-09-15 PROCEDURE — 87635 SARS-COV-2 COVID-19 AMP PRB: CPT | Performed by: EMERGENCY MEDICINE

## 2021-09-15 PROCEDURE — 36415 COLL VENOUS BLD VENIPUNCTURE: CPT | Performed by: INTERNAL MEDICINE

## 2021-09-15 PROCEDURE — C9803 HOPD COVID-19 SPEC COLLECT: HCPCS | Performed by: EMERGENCY MEDICINE

## 2021-09-15 PROCEDURE — 250N000011 HC RX IP 250 OP 636: Performed by: EMERGENCY MEDICINE

## 2021-09-15 PROCEDURE — 36415 COLL VENOUS BLD VENIPUNCTURE: CPT | Performed by: EMERGENCY MEDICINE

## 2021-09-15 PROCEDURE — G0378 HOSPITAL OBSERVATION PER HR: HCPCS

## 2021-09-15 PROCEDURE — 84132 ASSAY OF SERUM POTASSIUM: CPT | Performed by: PHYSICIAN ASSISTANT

## 2021-09-15 PROCEDURE — 36415 COLL VENOUS BLD VENIPUNCTURE: CPT | Performed by: FAMILY MEDICINE

## 2021-09-15 PROCEDURE — 250N000013 HC RX MED GY IP 250 OP 250 PS 637: Performed by: EMERGENCY MEDICINE

## 2021-09-15 PROCEDURE — 96366 THER/PROPH/DIAG IV INF ADDON: CPT | Performed by: EMERGENCY MEDICINE

## 2021-09-15 PROCEDURE — 250N000013 HC RX MED GY IP 250 OP 250 PS 637: Performed by: PHYSICIAN ASSISTANT

## 2021-09-15 PROCEDURE — 83605 ASSAY OF LACTIC ACID: CPT | Performed by: INTERNAL MEDICINE

## 2021-09-15 PROCEDURE — 99207 PR APP CREDIT; MD BILLING SHARED VISIT: CPT | Performed by: PHYSICIAN ASSISTANT

## 2021-09-15 PROCEDURE — 258N000003 HC RX IP 258 OP 636: Performed by: EMERGENCY MEDICINE

## 2021-09-15 PROCEDURE — 96365 THER/PROPH/DIAG IV INF INIT: CPT | Performed by: EMERGENCY MEDICINE

## 2021-09-15 PROCEDURE — 258N000003 HC RX IP 258 OP 636: Performed by: PHYSICIAN ASSISTANT

## 2021-09-15 PROCEDURE — 99219 PR INITIAL OBSERVATION CARE,LEVEL II: CPT | Performed by: FAMILY MEDICINE

## 2021-09-15 PROCEDURE — 82374 ASSAY BLOOD CARBON DIOXIDE: CPT | Performed by: EMERGENCY MEDICINE

## 2021-09-15 PROCEDURE — 99285 EMERGENCY DEPT VISIT HI MDM: CPT | Performed by: EMERGENCY MEDICINE

## 2021-09-15 PROCEDURE — 83605 ASSAY OF LACTIC ACID: CPT | Performed by: EMERGENCY MEDICINE

## 2021-09-15 PROCEDURE — 85025 COMPLETE CBC W/AUTO DIFF WBC: CPT | Performed by: EMERGENCY MEDICINE

## 2021-09-15 PROCEDURE — 99285 EMERGENCY DEPT VISIT HI MDM: CPT | Mod: 25 | Performed by: EMERGENCY MEDICINE

## 2021-09-15 PROCEDURE — 96375 TX/PRO/DX INJ NEW DRUG ADDON: CPT | Performed by: EMERGENCY MEDICINE

## 2021-09-15 PROCEDURE — 84132 ASSAY OF SERUM POTASSIUM: CPT | Performed by: FAMILY MEDICINE

## 2021-09-15 PROCEDURE — 96376 TX/PRO/DX INJ SAME DRUG ADON: CPT

## 2021-09-15 PROCEDURE — 96367 TX/PROPH/DG ADDL SEQ IV INF: CPT | Performed by: EMERGENCY MEDICINE

## 2021-09-15 PROCEDURE — 86140 C-REACTIVE PROTEIN: CPT | Performed by: PHYSICIAN ASSISTANT

## 2021-09-15 RX ORDER — NALOXONE HYDROCHLORIDE 0.4 MG/ML
0.2 INJECTION, SOLUTION INTRAMUSCULAR; INTRAVENOUS; SUBCUTANEOUS
Status: DISCONTINUED | OUTPATIENT
Start: 2021-09-15 | End: 2021-09-20 | Stop reason: HOSPADM

## 2021-09-15 RX ORDER — OXYCODONE HYDROCHLORIDE 5 MG/1
5 TABLET ORAL EVERY 4 HOURS PRN
Status: DISCONTINUED | OUTPATIENT
Start: 2021-09-15 | End: 2021-09-20 | Stop reason: HOSPADM

## 2021-09-15 RX ORDER — PANTOPRAZOLE SODIUM 40 MG/1
40 TABLET, DELAYED RELEASE ORAL ONCE
Status: COMPLETED | OUTPATIENT
Start: 2021-09-15 | End: 2021-09-15

## 2021-09-15 RX ORDER — NALOXONE HYDROCHLORIDE 0.4 MG/ML
0.4 INJECTION, SOLUTION INTRAMUSCULAR; INTRAVENOUS; SUBCUTANEOUS
Status: DISCONTINUED | OUTPATIENT
Start: 2021-09-15 | End: 2021-09-20 | Stop reason: HOSPADM

## 2021-09-15 RX ORDER — CEFAZOLIN SODIUM 2 G/100ML
2 INJECTION, SOLUTION INTRAVENOUS ONCE
Status: COMPLETED | OUTPATIENT
Start: 2021-09-15 | End: 2021-09-15

## 2021-09-15 RX ORDER — ONDANSETRON 2 MG/ML
4 INJECTION INTRAMUSCULAR; INTRAVENOUS EVERY 6 HOURS PRN
Status: DISCONTINUED | OUTPATIENT
Start: 2021-09-15 | End: 2021-09-20

## 2021-09-15 RX ORDER — NAPROXEN 250 MG/1
500 TABLET ORAL EVERY 12 HOURS PRN
Status: DISCONTINUED | OUTPATIENT
Start: 2021-09-15 | End: 2021-09-15

## 2021-09-15 RX ORDER — LIDOCAINE 40 MG/G
CREAM TOPICAL
Status: DISCONTINUED | OUTPATIENT
Start: 2021-09-15 | End: 2021-09-20

## 2021-09-15 RX ORDER — CEFAZOLIN SODIUM 2 G/100ML
2 INJECTION, SOLUTION INTRAVENOUS EVERY 8 HOURS
Status: DISCONTINUED | OUTPATIENT
Start: 2021-09-15 | End: 2021-09-16 | Stop reason: ALTCHOICE

## 2021-09-15 RX ORDER — ONDANSETRON 4 MG/1
4 TABLET, ORALLY DISINTEGRATING ORAL EVERY 6 HOURS PRN
Status: DISCONTINUED | OUTPATIENT
Start: 2021-09-15 | End: 2021-09-20

## 2021-09-15 RX ORDER — HYDROMORPHONE HYDROCHLORIDE 1 MG/ML
0.5 INJECTION, SOLUTION INTRAMUSCULAR; INTRAVENOUS; SUBCUTANEOUS ONCE
Status: COMPLETED | OUTPATIENT
Start: 2021-09-15 | End: 2021-09-15

## 2021-09-15 RX ORDER — POTASSIUM CHLORIDE 1500 MG/1
40 TABLET, EXTENDED RELEASE ORAL ONCE
Status: COMPLETED | OUTPATIENT
Start: 2021-09-15 | End: 2021-09-15

## 2021-09-15 RX ORDER — ACETAMINOPHEN 325 MG/1
650 TABLET ORAL EVERY 6 HOURS PRN
Status: DISCONTINUED | OUTPATIENT
Start: 2021-09-15 | End: 2021-09-20 | Stop reason: HOSPADM

## 2021-09-15 RX ORDER — ALBUTEROL SULFATE 0.83 MG/ML
2.5 SOLUTION RESPIRATORY (INHALATION) EVERY 6 HOURS PRN
Status: DISCONTINUED | OUTPATIENT
Start: 2021-09-15 | End: 2021-09-20 | Stop reason: HOSPADM

## 2021-09-15 RX ORDER — CEFAZOLIN SODIUM 2 G/100ML
2 INJECTION, SOLUTION INTRAVENOUS ONCE
Status: DISCONTINUED | OUTPATIENT
Start: 2021-09-15 | End: 2021-09-15

## 2021-09-15 RX ORDER — PROCHLORPERAZINE 25 MG
25 SUPPOSITORY, RECTAL RECTAL EVERY 12 HOURS PRN
Status: DISCONTINUED | OUTPATIENT
Start: 2021-09-15 | End: 2021-09-20 | Stop reason: HOSPADM

## 2021-09-15 RX ORDER — ACETAMINOPHEN 650 MG/1
650 SUPPOSITORY RECTAL EVERY 6 HOURS PRN
Status: DISCONTINUED | OUTPATIENT
Start: 2021-09-15 | End: 2021-09-20 | Stop reason: HOSPADM

## 2021-09-15 RX ORDER — ACETAMINOPHEN 500 MG
1000 TABLET ORAL ONCE
Status: COMPLETED | OUTPATIENT
Start: 2021-09-15 | End: 2021-09-15

## 2021-09-15 RX ORDER — PANTOPRAZOLE SODIUM 40 MG/1
40 TABLET, DELAYED RELEASE ORAL
Status: DISCONTINUED | OUTPATIENT
Start: 2021-09-16 | End: 2021-09-20 | Stop reason: HOSPADM

## 2021-09-15 RX ORDER — PROCHLORPERAZINE MALEATE 5 MG
10 TABLET ORAL EVERY 6 HOURS PRN
Status: DISCONTINUED | OUTPATIENT
Start: 2021-09-15 | End: 2021-09-20 | Stop reason: HOSPADM

## 2021-09-15 RX ORDER — CEFAZOLIN SODIUM 1 G/50ML
1250 SOLUTION INTRAVENOUS EVERY 12 HOURS
Status: DISCONTINUED | OUTPATIENT
Start: 2021-09-15 | End: 2021-09-17

## 2021-09-15 RX ADMIN — OXYCODONE HYDROCHLORIDE 5 MG: 5 TABLET ORAL at 23:10

## 2021-09-15 RX ADMIN — VANCOMYCIN HYDROCHLORIDE 1250 MG: 10 INJECTION, POWDER, LYOPHILIZED, FOR SOLUTION INTRAVENOUS at 23:03

## 2021-09-15 RX ADMIN — CEFAZOLIN SODIUM 2 G: 2 INJECTION, SOLUTION INTRAVENOUS at 18:31

## 2021-09-15 RX ADMIN — OXYCODONE HYDROCHLORIDE 5 MG: 5 TABLET ORAL at 17:42

## 2021-09-15 RX ADMIN — CEFAZOLIN SODIUM 2 G: 2 INJECTION, SOLUTION INTRAVENOUS at 10:45

## 2021-09-15 RX ADMIN — POTASSIUM CHLORIDE 40 MEQ: 20 TABLET, EXTENDED RELEASE ORAL at 17:42

## 2021-09-15 RX ADMIN — HYDROMORPHONE HYDROCHLORIDE 0.5 MG: 1 INJECTION, SOLUTION INTRAMUSCULAR; INTRAVENOUS; SUBCUTANEOUS at 15:52

## 2021-09-15 RX ADMIN — SODIUM CHLORIDE, POTASSIUM CHLORIDE, SODIUM LACTATE AND CALCIUM CHLORIDE 1000 ML: 600; 310; 30; 20 INJECTION, SOLUTION INTRAVENOUS at 10:08

## 2021-09-15 RX ADMIN — ACETAMINOPHEN 1000 MG: 500 TABLET, FILM COATED ORAL at 15:53

## 2021-09-15 RX ADMIN — PANTOPRAZOLE SODIUM 40 MG: 40 TABLET, DELAYED RELEASE ORAL at 11:08

## 2021-09-15 RX ADMIN — VANCOMYCIN HYDROCHLORIDE 1500 MG: 10 INJECTION, POWDER, LYOPHILIZED, FOR SOLUTION INTRAVENOUS at 11:32

## 2021-09-15 ASSESSMENT — MIFFLIN-ST. JEOR: SCORE: 1687.78

## 2021-09-15 NOTE — PHARMACY-VANCOMYCIN DOSING SERVICE
Pharmacy Vancomycin Initial Note  Date of Service September 15, 2021  Patient's  1975  46 year old, male    Indication: Skin and Soft Tissue Infection    Current estimated CrCl = Estimated Creatinine Clearance: 125.1 mL/min (based on SCr of 0.78 mg/dL).    Creatinine for last 3 days  9/15/2021:  9:51 AM Creatinine 0.78 mg/dL    Recent Vancomycin Level(s) for last 3 days  No results found for requested labs within last 72 hours.      Vancomycin IV Administrations (past 72 hours)      No vancomycin orders with administrations in past 72 hours.                Nephrotoxins and other renal medications (From now, onward)    Start     Dose/Rate Route Frequency Ordered Stop    09/15/21 2000  vancomycin (VANCOCIN) 1,250 mg in sodium chloride 0.9 % 250 mL intermittent infusion      1,250 mg  over 90 Minutes Intravenous EVERY 12 HOURS 09/15/21 1052      09/15/21 1020  vancomycin (VANCOCIN) 1,500 mg in sodium chloride 0.9 % 250 mL intermittent infusion      1,500 mg  over 90 Minutes Intravenous ONCE 09/15/21 1017            Contrast Orders - past 72 hours (72h ago, onward)    None          Loading dose: 1500 mg   Regimen: 1250 mg IV every 12 hours.  Start date: 09/15/2021  Exposure target: AUC24 (range) 400-600 mg/L.hr   AUC24,ss: 479 mg/L.hr  Probability of AUC24 > 400: 68 %  Ctrough,ss: 14.1 mg/L  Probability of Ctrough,ss > 20: 25 %  Probability of nephrotoxicity (Lodise TAMIKO ): 9 %          Plan:  1. Start vancomycin at 1500 mg IV once, then 1250 mg IV every 12 hours.   2. Vancomycin monitoring method: AUC  3. Vancomycin therapeutic monitoring goal: 400-600 mg*h/L  4. Pharmacy will check vancomycin levels as appropriate in 1-3 Days.    5. Serum creatinine levels will be ordered a minimum of twice weekly.      [place] [place]

## 2021-09-15 NOTE — ED PROVIDER NOTES
History     Chief Complaint   Patient presents with     Joint Swelling     septic bursitis elbow, aspirated yesterday cultures pending, on keflex getting worse. Clinic told pt to come to ED     HPI  Daniele Carter is a 46 year old right-hand-dominant male with several days of right posterior elbow redness, swelling and pain which began after tearing apart an old camper, which include spending time illness hands, knees and elbows, with worsening pain, swelling and redness despite beginning Keflex 500 mg 4 times daily 2 days ago.  Yesterday he was seen in the orthopedic sports medicine clinic and had aspiration of 6 mL from the right elbow olecranon bursa with fluid reported be yellow, cloudy and blood-tinged.  Gram stain showed gram-positive cocci and results of the culture and sensitivities are pending.  Constant, aching, severe, nonradiating pain is exacerbated by elbow/arm use and refractory to Naprosyn prescribed 2 days ago, and Norco prescribed yesterday. He feels chilled, but has no fever.  No distal arm CMS dysfunction.  No prior history of MRSA.  No other acute complaints or concerns.    Allergies:  Allergies   Allergen Reactions     Bee Anaphylaxis     Asa [Aspirin]      Unknown reaction- Hives???     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 03/22/2010     Priority: Medium     Left-sided chest wall pain 09/14/2009     Priority: Medium     Acid reflux 09/14/2009     Priority: Medium        Past Medical History:    Past Medical History:   Diagnosis Date     Esophageal reflux      Hyperlipidemia      Pain in joint, lower leg        Past Surgical History:    Past Surgical History:   Procedure  "Laterality Date     COLONOSCOPY  5/6/2011    Procedure:COLONOSCOPY; Surgeon:JONAS JAIN; Location:[facility]     ESOPHAGOSCOPY, GASTROSCOPY, DUODENOSCOPY (EGD), COMBINED N/A 3/28/2017    Procedure: COMBINED ESOPHAGOSCOPY, GASTROSCOPY, DUODENOSCOPY (EGD), BIOPSY SINGLE OR MULTIPLE;  Surgeon: Nikolai Valladares MD;  Location: [facility]       Family History:    Family History   Problem Relation Age of Onset     Lipids Mother      Alzheimer Disease Mother      Cancer Father         lymphoma; Sprayed with [employer] [place] in [place]     Diabetes Father         type 2     Heart Disease Father      No Known Problems Brother 34        history of  heart problems     Diabetes Maternal Grandfather        Social History:  Marital Status:   [2]  Social History     Tobacco Use     Smoking status: Never Smoker     Smokeless tobacco: Never Used   Substance Use Topics     Alcohol use: Yes     Comment: weekends     Drug use: No        Medications:    No current outpatient medications on file.      Review of Systems  Intermittent nontender bleeding mass at the anus in the past month, with small amount of blood on toilet paper when he wipes after BMs.  As mentioned above in the history present illness.  All other systems were reviewed and are negative.      Physical Exam   BP: (!) 144/93  Pulse: 111  Temp: 99  F (37.2  C)  Resp: 16  Height: 170.2 cm (5' 7\")  Weight: 83.9 kg (185 lb)  SpO2: 98 %      Physical Exam  Vitals and nursing note reviewed.   Constitutional:       General: He is not in acute distress.     Appearance: Normal appearance. He is well-developed. He is not ill-appearing or diaphoretic.   HENT:      Head: Normocephalic and atraumatic.      Right Ear: External ear normal.      Left Ear: External ear normal.      Nose: Nose normal.   Eyes:      General: No scleral icterus.     Extraocular Movements: Extraocular movements intact.      Conjunctiva/sclera: Conjunctivae normal.   Neck:      Trachea: No tracheal " deviation.   Cardiovascular:      Rate and Rhythm: Normal rate and regular rhythm.      Heart sounds: Normal heart sounds. No murmur heard.   No friction rub. No gallop.    Pulmonary:      Effort: Pulmonary effort is normal. No respiratory distress.      Breath sounds: Normal breath sounds. No wheezing, rhonchi or rales.   Abdominal:      General: There is no distension.      Palpations: Abdomen is soft.      Tenderness: There is no abdominal tenderness.   Musculoskeletal:         General: Swelling ( Posterior right elbow area, as photographed) present. Normal range of motion.      Cervical back: Normal range of motion and neck supple.      Right lower leg: No edema.      Left lower leg: No edema.   Skin:     General: Skin is warm and dry.      Coloration: Skin is not pale.      Findings: Erythema ( Right posterior elbow area extending to the upper arm and forearm, as photographed) present. No rash.   Neurological:      General: No focal deficit present.      Mental Status: He is alert and oriented to person, place, and time.      Sensory: No sensory deficit.      Motor: No weakness.   Psychiatric:         Mood and Affect: Mood normal.         Behavior: Behavior normal.             ED Course        Procedures              Results for orders placed or performed during the hospital encounter of 09/15/21 (from the past 24 hour(s))   [place] Draw    Narrative    The following orders were created for panel order [place] Draw.  Procedure                               Abnormality         Status                     ---------                               -----------         ------                     Extra Blue Top Tube[[id number]]                              Final result               Extra Red Top Tube[[id number]]                               Final result               Extra Green Top (Lithium...[[id number]]                      Final result               Extra Purple Top Tube[[id number]]                            Final  result               Extra Green Top (Lithium...[[id number]]                      Final result                 Please view results for these tests on the individual orders.   Extra Blue Top Tube   Result Value Ref Range    Hold Specimen JIC    Extra Red Top Tube   Result Value Ref Range    Hold Specimen JIC    Extra Green Top (Lithium Heparin) Tube   Result Value Ref Range    Hold Specimen JIC    Extra Purple Top Tube   Result Value Ref Range    Hold Specimen JIC    Extra Green Top (Lithium Heparin) ON ICE   Result Value Ref Range    Hold Specimen JIC    Basic metabolic panel   Result Value Ref Range    Sodium 137 133 - 144 mmol/L    Potassium 3.2 (L) 3.4 - 5.3 mmol/L    Chloride 104 94 - 109 mmol/L    Carbon Dioxide (CO2) 28 20 - 32 mmol/L    Anion Gap 5 3 - 14 mmol/L    Urea Nitrogen 10 7 - 30 mg/dL    Creatinine 0.78 0.66 - 1.25 mg/dL    Calcium 8.4 (L) 8.5 - 10.1 mg/dL    Glucose 130 (H) 70 - 99 mg/dL    GFR Estimate >90 >60 mL/min/1.73m2   CBC with platelets differential    Narrative    The following orders were created for panel order CBC with platelets differential.  Procedure                               Abnormality         Status                     ---------                               -----------         ------                     CBC with platelets and d...[[id number]]  Abnormal            Final result                 Please view results for these tests on the individual orders.   Lactic acid whole blood   Result Value Ref Range    Lactic Acid 1.0 0.7 - 2.0 mmol/L   CBC with platelets and differential   Result Value Ref Range    WBC Count 16.1 (H) 4.0 - 11.0 10e3/uL    RBC Count 5.17 4.40 - 5.90 10e6/uL    Hemoglobin 14.2 13.3 - 17.7 g/dL    Hematocrit 43.8 40.0 - 53.0 %    MCV 85 78 - 100 fL    MCH 27.5 26.5 - 33.0 pg    MCHC 32.4 31.5 - 36.5 g/dL    RDW 13.0 10.0 - 15.0 %    Platelet Count 293 150 - 450 10e3/uL    % Neutrophils 82 %    % Lymphocytes 10 %    % Monocytes 7 %    % Eosinophils 0 %    %  Basophils 0 %    % Immature Granulocytes 1 %    NRBCs per 100 WBC 0 <1 /100    Absolute Neutrophils 13.4 (H) 1.6 - 8.3 10e3/uL    Absolute Lymphocytes 1.5 0.8 - 5.3 10e3/uL    Absolute Monocytes 1.1 0.0 - 1.3 10e3/uL    Absolute Eosinophils 0.0 0.0 - 0.7 10e3/uL    Absolute Basophils 0.0 0.0 - 0.2 10e3/uL    Absolute Immature Granulocytes 0.1 (H) <=0.0 10e3/uL    Absolute NRBCs 0.0 10e3/uL   Asymptomatic COVID-19 Virus (Coronavirus) by PCR Nasopharyngeal    Specimen: Nasopharyngeal; Swab   Result Value Ref Range    SARS CoV2 PCR Negative Negative    Narrative    Testing was performed using the diandra  SARS-CoV-2 & Influenza A/B Assay on the diandra  Johnna  System.  This test should be ordered for the detection of SARS-COV-2 in individuals who meet SARS-CoV-2 clinical and/or epidemiological criteria. Test performance is unknown in asymptomatic patients.  This test is for in vitro diagnostic use under the FDA EUA for laboratories certified under CLIA to perform moderate and/or high complexity testing. This test has not been FDA cleared or approved.  A negative test does not rule out the presence of PCR inhibitors in the specimen or target RNA in concentration below the limit of detection for the assay. The possibility of a false negative should be considered if the patient's recent exposure or clinical presentation suggests COVID-19.  [facility] are certified under the Clinical Laboratory Improvement Amendments of 1988 (CLIA-88) as qualified to perform moderate and/or high complexity laboratory testing.   Potassium   Result Value Ref Range    Potassium 3.3 (L) 3.4 - 5.3 mmol/L   Lactic Acid STAT   Result Value Ref Range    Lactic Acid 0.6 (L) 0.7 - 2.0 mmol/L   CRP inflammation   Result Value Ref Range    CRP Inflammation 112.0 (H) 0.0 - 8.0 mg/L   Potassium   Result Value Ref Range    Potassium 3.8 3.4 - 5.3 mmol/L       Medications   lidocaine 1 % 0.1-1 mL (has no administration in time range)    lidocaine (LMX4) kit (has no administration in time range)   sodium chloride (PF) 0.9% PF flush 3 mL (has no administration in time range)   vancomycin (VANCOCIN) 1,250 mg in sodium chloride 0.9 % 250 mL intermittent infusion (1,250 mg Intravenous New Bag 9/15/21 2303)   albuterol (PROVENTIL) neb solution 2.5 mg (has no administration in time range)   lidocaine 1 % 0.1-1 mL (has no administration in time range)   lidocaine (LMX4) kit (has no administration in time range)   sodium chloride (PF) 0.9% PF flush 3 mL ( Intracatheter Canceled Entry 9/16/21 0130)   sodium chloride (PF) 0.9% PF flush 3 mL (has no administration in time range)   ondansetron (ZOFRAN-ODT) ODT tab 4 mg (has no administration in time range)     Or   ondansetron (ZOFRAN) injection 4 mg (has no administration in time range)   ceFAZolin (ANCEF) intermittent infusion 2 g in 100 mL dextrose PRE-MIX (2 g Intravenous New Bag 9/15/21 1831)   prochlorperazine (COMPAZINE) injection 10 mg (has no administration in time range)     Or   prochlorperazine (COMPAZINE) tablet 10 mg (has no administration in time range)     Or   prochlorperazine (COMPAZINE) suppository 25 mg (has no administration in time range)   acetaminophen (TYLENOL) tablet 650 mg (has no administration in time range)     Or   acetaminophen (TYLENOL) Suppository 650 mg (has no administration in time range)   oxyCODONE (ROXICODONE) tablet 5 mg (5 mg Oral Given 9/15/21 2310)   pantoprazole (PROTONIX) EC tablet 40 mg (has no administration in time range)   naloxone (NARCAN) injection 0.2 mg (has no administration in time range)     Or   naloxone (NARCAN) injection 0.4 mg (has no administration in time range)     Or   naloxone (NARCAN) injection 0.2 mg (has no administration in time range)     Or   naloxone (NARCAN) injection 0.4 mg (has no administration in time range)   ceFAZolin (ANCEF) intermittent infusion 2 g in 100 mL dextrose PRE-MIX (0 g Intravenous Stopped 9/15/21 1132)   lactated  ringers BOLUS 1,000 mL (0 mLs Intravenous Stopped 9/15/21 1325)   pantoprazole (PROTONIX) EC tablet 40 mg (40 mg Oral Given 9/15/21 1108)   vancomycin (VANCOCIN) 1,500 mg in sodium chloride 0.9 % 250 mL intermittent infusion (0 mg Intravenous Stopped 9/15/21 1325)   HYDROmorphone (PF) (DILAUDID) injection 0.5 mg (0.5 mg Intravenous Given 9/15/21 1552)   acetaminophen (TYLENOL) tablet 1,000 mg (1,000 mg Oral Given 9/15/21 1553)   potassium chloride ER (KLOR-CON M) CR tablet 40 mEq (40 mEq Oral Given 9/15/21 1742)     12:57 PM - I reviewed the case with Dr. Presley, Hospitalist on-call.  He accepted care of the patient upon admission.      Assessments & Plan (with Medical Decision Making)   46 year old right-hand-dominant male with several days of right posterior elbow redness, swelling and pain which began after tearing apart an old camper, which include spending time on his hands, knees and elbows, with worsening pain, swelling and redness despite beginning Keflex 500 mg 4 times daily 2 days ago.  Yesterday he was seen in the Orthopedic/Sports medicine clinic and had aspiration of 6 mL from the right elbow olecranon bursa with fluid reported be yellow, cloudy and blood-tinged.  Gram stain showed Gram-Positive Cocci and results of the culture and sensitivities are pending. Not reported if GPC on gram stain were pairs and chains (strep)  vs clusters (staph).  No history of MRSA.  He was given Ancef 2 g IV, he had not yet taken Keflex this morning. Vancomycin IV for MRSA coverage was initiated as he is worsening despite 2 days of first generation cephalosporin therapy, pending results of the olecranon bursa fluid culture and sensitivities.    I have reviewed the nursing notes.    I have reviewed the findings, diagnosis, plan and need for follow up with the patient.    Current Discharge Medication List          Final diagnoses:   Septic olecranon bursitis of right elbow       9/15/2021   [facility]  EMERGENCY DEPT     Larry Martinez MD  09/15/21 5780

## 2021-09-15 NOTE — H&P
[facility]    History and Physical - Hospitalist Service       Date of Admission:  9/15/2021    Assessment & Plan      Daniele Carter is a 46 year old male admitted on 9/15/2021. He presented to the emergency department for evaluation of worsening right elbow swelling despite oral antibiotic use and was found to have cellulitis and septic olecranon bursitis for which he is being admitted for further evaluation and treatment.    Septic olecranon bursitis of right elbow  Right elbow cellulitis  Acute onset of right elbow pain on 9/12, progressively worsening. Evaluated in urgent care on 9/13 and prescribed Keflex 500 mg qid x 10 days. Evaluated by Sports Med on 9/14, s/p right olecranon bursa arthrocentesis. Erythema worsening despite Keflex, so advised to go to the emergency department on 9/15. Presented with low grade fever and leukocytosis.  Aspirate from 9/14 growing gram positive cocci.  - Ancef and vancomycin started 9/15/2021, continue   - Trend CRP   - Analgesia: prn ice, prn acetaminophen, prn Naproxen, prn oxycodone   - See note from Dr. Hermes Martinez 9/15/2021 for picture from day of admission  - Erythema border was outlined and dated on 9/15    Leukocytosis with left shift  Admit WBC 16.1, ANC 13.4. Low grade fever at time of admission. Due to bursitis and cellulitis as noted above.  - Treat infection, follow culture and CRP  - CBC with diff in am     Hypokalemia   Admit K = 3.2.   - K replacement    Acid reflux  Managed prior to admission with omeprazole 40 mg daily, continue.     Mild persistent asthma  Stable respiratory status on admission, no evidence of exacerbation. Managed prior to admission with Arnuity daily (when he remembers) and prn albuterol.  - Hold Arnuity in the hospital  - Prn albuterol neb available     COVID status  COVID PCR negative 9/15/2021  Patient had known COVID infection with positive PCR on 10/29/20  Patient has not been vaccinated, is not  "interested in vaccine at this time due to history of side effects with many medications, vitamins, and vaccines.          Diet: Regular Diet Adult  DVT Prophylaxis: Low Risk/Ambulatory with no VTE prophylaxis indicated  Herrera Catheter: Not present  Central Lines: None  Code Status: Full Code  - discussed with patient on admission       Disposition Plan   Expected discharge: 1-2 days recommended to prior living arrangement once clinically improving, outpatient antibiotic plan established based on culture results.     The patient's care was discussed with the Attending Physician, Dr. Clark Bradford and Patient.    Kim Rodriguez PA-C  [facility]  Securely message with the Vocera Web Console (learn more here)  Text page via [facility] Paging/Directory      ______________________________________________________________________    Chief Complaint   \"My elbow started hurting on Sunday (9/12) and its been getting worse. Yesterday they lanced it and it was better, but it was worse again today so they told me to go to the emergency department.\"    History is obtained from the patient, review of EMR, and emergency department documentation.     History of Present Illness   Daniele Carter is a 46 year old male who presented to the emergency department for evaluation of worsening right elbow erythema, edema, and pain.    Pain started acutely four days ago (9/12) while patient was helping to gut an old trailer \"with mouse poop and urine all over.\"  He did not have a known injury to the area, no known open lesions, but had been doing physical labor involving a lot of lifting and elbow movement.  The elbow pain was sudden and acute to the point he needed to stop the project early.  That evening he tried an NSAID at home.  He felt worse the following morning and went to urgent care where they prescribed Keflex 500 mg qid x 10 days.    Yesterday 9/14 he was not improving so he went to Sports Medicine " "and underwent a right olecranon bursa arthrocentesis, which was sent for culture.   They prescribed him some Norco and advised continuation of the Keflex.  He felt significantly better after the arthrocentesis; the pain and edema were greatly reduced.    This morning he woke up and the erythema and edema were worse again.  They sent a picture through [employer] and were advised to go to the emergency department for evaluation, where it was determined that patient warrants hospital admission for IV antibiotics.    Currently his right hand is swollen and slightly numb / tingly. He has some stiff finger joints when he attempts to make a fist, as well as a stiff wrist - this is improving since he arrived in the emergency department.  He still has stiff elbow flexion / extension with some pain with range of motion.  He has had some chills but no known fevers.     Pain is present in the right elbow, is constant but waxes and wanes between a 5-7/10, described as a \"sharp and warm pain.\"  Pain is better with NSAIDs and pain medications in the emergency department, worsens with joint movement or pressure.     On review of systems patient has poor sleep at baseline, unchanged. He works \"the [employer] shift\" and never sleeps very well.  He says he has a lot of allergies and reactions \"to medications, vitamins, and vaccines.\"  He has some right chest / pectoral pain that is present with his new cellulitis.  He has GERD and occasionally gets loose stool that he attributes to his GERD.   The remainder review of systems is negative.     Review of Systems    The 10 point Review of Systems is negative other than noted in the HPI or here.     Past Medical History    I have reviewed this patient's medical history and updated it with pertinent information if needed.   Past Medical History:   Diagnosis Date     Esophageal reflux      Hyperlipidemia      Pain in joint, lower leg        Past Surgical History   I have reviewed this patient's " surgical history and updated it with pertinent information if needed.  Past Surgical History:   Procedure Laterality Date     COLONOSCOPY  5/6/2011    Procedure:COLONOSCOPY; Surgeon:JONAS JAIN; Location:[facility]     ESOPHAGOSCOPY, GASTROSCOPY, DUODENOSCOPY (EGD), COMBINED N/A 3/28/2017    Procedure: COMBINED ESOPHAGOSCOPY, GASTROSCOPY, DUODENOSCOPY (EGD), BIOPSY SINGLE OR MULTIPLE;  Surgeon: Nikolai Valladares MD;  Location: [facility]       Social History   I have reviewed this patient's social history and updated it with pertinent information if needed.  Social History     Tobacco Use     Smoking status: Never Smoker     Smokeless tobacco: Never Used   Substance Use Topics     Alcohol use: Yes     Comment: weekends     Drug use: No       Family History   I have reviewed this patient's family history and updated it with pertinent information if needed.  Family History   Problem Relation Age of Onset     Lipids Mother      Alzheimer Disease Mother      Cancer Father         lymphoma; Sprayed with Angent Orange in [place]     Diabetes Father         type 2     Heart Disease Father      No Known Problems Brother 34        history of  heart problems     Diabetes Maternal Grandfather        Prior to Admission Medications   Prior to Admission Medications   Prescriptions Last Dose Informant Patient Reported? Taking?   EPINEPHrine (ANY BX GENERIC EQUIV) 0.3 MG/0.3ML injection 2-pack  Self No Yes   Sig: Inject 0.3 mLs (0.3 mg) into the muscle once as needed for anaphylaxis   HYDROcodone-acetaminophen (NORCO) 5-325 MG tablet 9/15/2021 at 0300 Self No Yes   Sig: Take 1 tablet by mouth every 6 hours as needed for severe pain   albuterol (PROAIR HFA/PROVENTIL HFA/VENTOLIN HFA) 108 (90 Base) MCG/ACT inhaler Past Month at Unknown time Self No Yes   Sig: Inhale 2 puffs into the lungs every 6 hours This is your rescue inhaler.   cephALEXin (KEFLEX) 500 MG capsule 9/15/2021 at 0330 Self No Yes   Sig: Take 1 capsule (500 mg) by  mouth 4 times daily for 10 days   fluticasone (ARNUITY ELLIPTA) 200 MCG/ACT inhaler Past Week at Unknown time Self No Yes   Sig: Inhale 1 puff into the lungs daily   naproxen (NAPROSYN) 500 MG tablet 9/14/2021 Self No Yes   Sig: Take 1 tablet (500 mg) by mouth 2 times daily (with meals)   omeprazole (PRILOSEC) 40 MG DR capsule 9/14/2021 at Unknown time Self No Yes   Sig: Take 1 capsule by mouth once daily      Facility-Administered Medications: None     Allergies   Allergies   Allergen Reactions     Bee Anaphylaxis     Asa [Aspirin]      Unknown reaction- Hives???     Chocolate Flavor Hives       Physical Exam   Vital Signs: Temp: 99.3  F (37.4  C) Temp src: Oral BP: 130/81 Pulse: 117   Resp: 18 SpO2: 97 % O2 Device: None (Room air)    Weight: 185 lbs 0 oz    Constitutional: Alert, oriented, cooperative. No apparent distress, appears nontoxic. Speaking in full coherent sentences.     Eyes: Eyes are clear, pupils are reactive. No scleral icterus.    HEENT: Oropharynx is clear and moist, no lesions. Enlarged tonsils. Normocephalic, no evidence of cranial trauma.      Cardiovascular: Regular rhythm and rate, normal S1 and S2. No murmur, rubs, or gallops. Peripheral pulses intact bilaterally. No lower extremity edema.    Respiratory: Lung sounds are clear to auscultation bilaterally without wheezes, rhonchi, or crackles.    GI: Soft, non-distended. Non-tender, no rebound or guarding. No hepatosplenomegaly or masses appreciated. Normal bowel sounds.     Musculoskeletal: Without obvious deformity other than right elbow edema and erythema. Range of motion of right elbow is reduced, as well as some right wrist flexion/extension and reduced finger flexion. Normal muscle bulk and tone. Distal CMS intact, other than slight tingling of right hand.    Skin: Warm and dry, no ecchymoses. No mottling of skin. Right elbow with solid confluent erythema, tender and warm to the touch; border of erythema outlined and dated.       Neurologic: Patient moves all extremities.  is asymmetric, slightly weaker on the right. Gross strength and sensation are equal bilaterally.    Genitourinary: Deferred      Data   Data reviewed today: I reviewed all medications, new labs and imaging results over the last 24 hours. I personally reviewed no images or EKG's today.    Recent Labs   Lab 09/15/21  1805 09/15/21  0951   WBC  --  16.1*   HGB  --  14.2   MCV  --  85   PLT  --  293   NA  --  137   POTASSIUM 3.3* 3.2*   CHLORIDE  --  104   CO2  --  28   BUN  --  10   CR  --  0.78   ANIONGAP  --  5   PAULIE  --  8.4*   GLC  --  130*     No results found for this or any previous visit (from the past 24 hour(s)).

## 2021-09-15 NOTE — ED TRIAGE NOTES
Right elbow infection one week duration  Seen by ortho  On abx  Getting worse today, red and swelling, tender

## 2021-09-15 NOTE — PROGRESS NOTES
"[place] [facility]    Patient admitted to room 2300 at approximately 1632 via wheel chair from emergency room. Patient was accompanied by transport tech.     Verbal SBAR report received from Khari ALLEN prior to patient arrival.     Patient ambulated to bed independently. Patient alert and oriented X 3. Pain is controlled with current analgesics.  Medication(s) being used: acetaminophen and dilaudid. Admission vital signs: Blood pressure 130/81, pulse 117, temperature 99.3  F (37.4  C), temperature source Oral, resp. rate 18, height 1.702 m (5' 7\"), weight 83.9 kg (185 lb), SpO2 97 %. Patient was oriented to plan of care, call light, bed controls, tv, telephone, bathroom and visiting hours.     Risk Assessment    The following safety risks were identified during admission: none. Yellow risk band applied: NO.     Skin Initial Assessment    This writer admitted this patient and completed a partial skin assessment and Jasson score in the Adult PCS flowsheet. Appropriate interventions initiated as needed.     Secondary skin check - refused.         Education    Patient has a Alfred to Observation order: Yes  Observation education completed and documented: Yes      Anne Cameron RN    "

## 2021-09-16 PROBLEM — Z11.52 ENCOUNTER FOR SCREENING LABORATORY TESTING FOR SEVERE ACUTE RESPIRATORY SYNDROME CORONAVIRUS 2 (SARS-COV-2): Status: ACTIVE | Noted: 2021-09-16

## 2021-09-16 LAB
ALBUMIN SERPL-MCNC: 2.5 G/DL (ref 3.4–5)
ALP SERPL-CCNC: 66 U/L (ref 40–150)
ALT SERPL W P-5'-P-CCNC: 25 U/L (ref 0–70)
ANION GAP SERPL CALCULATED.3IONS-SCNC: 8 MMOL/L (ref 3–14)
AST SERPL W P-5'-P-CCNC: 14 U/L (ref 0–45)
BASOPHILS # BLD AUTO: 0 10E3/UL (ref 0–0.2)
BASOPHILS NFR BLD AUTO: 0 %
BILIRUB SERPL-MCNC: 1 MG/DL (ref 0.2–1.3)
BUN SERPL-MCNC: 11 MG/DL (ref 7–30)
CALCIUM SERPL-MCNC: 8.3 MG/DL (ref 8.5–10.1)
CHLORIDE BLD-SCNC: 104 MMOL/L (ref 94–109)
CO2 SERPL-SCNC: 25 MMOL/L (ref 20–32)
CREAT SERPL-MCNC: 0.81 MG/DL (ref 0.66–1.25)
CRP SERPL-MCNC: 105 MG/L (ref 0–8)
EOSINOPHIL # BLD AUTO: 0.1 10E3/UL (ref 0–0.7)
EOSINOPHIL NFR BLD AUTO: 1 %
ERYTHROCYTE [DISTWIDTH] IN BLOOD BY AUTOMATED COUNT: 13.1 % (ref 10–15)
GFR SERPL CREATININE-BSD FRML MDRD: >90 ML/MIN/1.73M2
GLUCOSE BLD-MCNC: 97 MG/DL (ref 70–99)
HCT VFR BLD AUTO: 41 % (ref 40–53)
HGB BLD-MCNC: 13.3 G/DL (ref 13.3–17.7)
IMM GRANULOCYTES # BLD: 0.1 10E3/UL
IMM GRANULOCYTES NFR BLD: 1 %
LYMPHOCYTES # BLD AUTO: 1.5 10E3/UL (ref 0.8–5.3)
LYMPHOCYTES NFR BLD AUTO: 12 %
MCH RBC QN AUTO: 27.5 PG (ref 26.5–33)
MCHC RBC AUTO-ENTMCNC: 32.4 G/DL (ref 31.5–36.5)
MCV RBC AUTO: 85 FL (ref 78–100)
MONOCYTES # BLD AUTO: 1.3 10E3/UL (ref 0–1.3)
MONOCYTES NFR BLD AUTO: 10 %
NEUTROPHILS # BLD AUTO: 9.7 10E3/UL (ref 1.6–8.3)
NEUTROPHILS NFR BLD AUTO: 76 %
NRBC # BLD AUTO: 0 10E3/UL
NRBC BLD AUTO-RTO: 0 /100
PLATELET # BLD AUTO: 303 10E3/UL (ref 150–450)
POTASSIUM BLD-SCNC: 3.6 MMOL/L (ref 3.4–5.3)
PROT SERPL-MCNC: 7.1 G/DL (ref 6.8–8.8)
RBC # BLD AUTO: 4.83 10E6/UL (ref 4.4–5.9)
SODIUM SERPL-SCNC: 137 MMOL/L (ref 133–144)
WBC # BLD AUTO: 12.8 10E3/UL (ref 4–11)

## 2021-09-16 PROCEDURE — 250N000013 HC RX MED GY IP 250 OP 250 PS 637: Performed by: INTERNAL MEDICINE

## 2021-09-16 PROCEDURE — 36415 COLL VENOUS BLD VENIPUNCTURE: CPT | Performed by: PHYSICIAN ASSISTANT

## 2021-09-16 PROCEDURE — 250N000011 HC RX IP 250 OP 636: Performed by: PHYSICIAN ASSISTANT

## 2021-09-16 PROCEDURE — 250N000013 HC RX MED GY IP 250 OP 250 PS 637: Performed by: PHYSICIAN ASSISTANT

## 2021-09-16 PROCEDURE — 120N000001 HC R&B MED SURG/OB

## 2021-09-16 PROCEDURE — 258N000003 HC RX IP 258 OP 636: Performed by: PHYSICIAN ASSISTANT

## 2021-09-16 PROCEDURE — 80053 COMPREHEN METABOLIC PANEL: CPT | Performed by: PHYSICIAN ASSISTANT

## 2021-09-16 PROCEDURE — G0378 HOSPITAL OBSERVATION PER HR: HCPCS

## 2021-09-16 PROCEDURE — 250N000013 HC RX MED GY IP 250 OP 250 PS 637: Performed by: FAMILY MEDICINE

## 2021-09-16 PROCEDURE — 86140 C-REACTIVE PROTEIN: CPT | Performed by: PHYSICIAN ASSISTANT

## 2021-09-16 PROCEDURE — 87040 BLOOD CULTURE FOR BACTERIA: CPT | Performed by: PHYSICIAN ASSISTANT

## 2021-09-16 PROCEDURE — 96376 TX/PRO/DX INJ SAME DRUG ADON: CPT

## 2021-09-16 PROCEDURE — 99232 SBSQ HOSP IP/OBS MODERATE 35: CPT | Performed by: PHYSICIAN ASSISTANT

## 2021-09-16 PROCEDURE — 85025 COMPLETE CBC W/AUTO DIFF WBC: CPT | Performed by: PHYSICIAN ASSISTANT

## 2021-09-16 RX ORDER — IBUPROFEN 400 MG/1
400 TABLET, FILM COATED ORAL EVERY 6 HOURS PRN
Status: DISCONTINUED | OUTPATIENT
Start: 2021-09-16 | End: 2021-09-16

## 2021-09-16 RX ORDER — CALCIUM CARBONATE 500 MG/1
500 TABLET, CHEWABLE ORAL 3 TIMES DAILY PRN
Status: DISCONTINUED | OUTPATIENT
Start: 2021-09-16 | End: 2021-09-20 | Stop reason: HOSPADM

## 2021-09-16 RX ORDER — FAMOTIDINE 20 MG/1
20 TABLET, FILM COATED ORAL 2 TIMES DAILY
Status: DISCONTINUED | OUTPATIENT
Start: 2021-09-16 | End: 2021-09-16

## 2021-09-16 RX ORDER — FAMOTIDINE 20 MG/1
20 TABLET, FILM COATED ORAL 2 TIMES DAILY
Status: DISCONTINUED | OUTPATIENT
Start: 2021-09-16 | End: 2021-09-20 | Stop reason: HOSPADM

## 2021-09-16 RX ORDER — IBUPROFEN 600 MG/1
600 TABLET, FILM COATED ORAL EVERY 6 HOURS PRN
Status: DISCONTINUED | OUTPATIENT
Start: 2021-09-16 | End: 2021-09-20 | Stop reason: HOSPADM

## 2021-09-16 RX ADMIN — PANTOPRAZOLE SODIUM 40 MG: 40 TABLET, DELAYED RELEASE ORAL at 05:50

## 2021-09-16 RX ADMIN — ACETAMINOPHEN 650 MG: 325 TABLET, FILM COATED ORAL at 07:48

## 2021-09-16 RX ADMIN — IBUPROFEN 600 MG: 600 TABLET, FILM COATED ORAL at 15:07

## 2021-09-16 RX ADMIN — IBUPROFEN 600 MG: 600 TABLET, FILM COATED ORAL at 21:04

## 2021-09-16 RX ADMIN — CEFAZOLIN SODIUM 2 G: 2 INJECTION, SOLUTION INTRAVENOUS at 10:21

## 2021-09-16 RX ADMIN — VANCOMYCIN HYDROCHLORIDE 1250 MG: 10 INJECTION, POWDER, LYOPHILIZED, FOR SOLUTION INTRAVENOUS at 11:05

## 2021-09-16 RX ADMIN — OXYCODONE HYDROCHLORIDE 5 MG: 5 TABLET ORAL at 12:17

## 2021-09-16 RX ADMIN — CALCIUM CARBONATE (ANTACID) CHEW TAB 500 MG 500 MG: 500 CHEW TAB at 10:08

## 2021-09-16 RX ADMIN — FAMOTIDINE 20 MG: 20 TABLET ORAL at 20:12

## 2021-09-16 RX ADMIN — CEFAZOLIN SODIUM 2 G: 2 INJECTION, SOLUTION INTRAVENOUS at 02:34

## 2021-09-16 RX ADMIN — VANCOMYCIN HYDROCHLORIDE 1250 MG: 10 INJECTION, POWDER, LYOPHILIZED, FOR SOLUTION INTRAVENOUS at 22:27

## 2021-09-16 RX ADMIN — FAMOTIDINE 20 MG: 20 TABLET ORAL at 15:12

## 2021-09-16 ASSESSMENT — ACTIVITIES OF DAILY LIVING (ADL)
ADLS_ACUITY_SCORE: 3

## 2021-09-16 ASSESSMENT — MIFFLIN-ST. JEOR: SCORE: 1711.63

## 2021-09-16 NOTE — PLAN OF CARE
"Patient continues to have pressure/pain right elbow. He also reports that  he has intermittent right upper wall chest pain \" I think is all connected\". Web paged to Dr Bradford to see if patient can get some ibuprofen and that the redness has extended outside of new markings inner arm about 2 cm.     Rosmery ORELLANA updated regarding above assessment.   "

## 2021-09-16 NOTE — CONSULTS
[facility] Consultation    Consultation - [facility]  Level of consult: Consult, follow and place orders    Daniele Nelson,  1975, MRN [id number]     Admitting Dx: Septic olecranon bursitis of right elbow [M71.121]  Encounter for screening laboratory testing for severe acute respiratory syndrome coronavirus 2 (SARS-CoV-2) [Z20.822]     PCP: Elmer Horn, [phone]     Code status:  Full Code     Extended Emergency Contact Information  Primary Emergency Contact: desiree arreola  Address: [address]  Home Phone: [phone]  Mobile Phone: [phone]  Relation: Significant other  Secondary Emergency Contact: gaurang nelson   [facility]  Home Phone: [phone]  Relation: Mother     Assessment:  Right elbow septic olecranon bursitis and cellulitis    Plan:  This patient was discussed with Dr.Ryan Reddy, on-call surgeon for [facility] and they are in agreement with the following plan.   Continue current IV antibiotic treatment per medicine with adjustment pending cultures, currently on Ancef and vancomycin.   Will continue to monitor closely for improvement.  No plan for repeat aspiration at this time due to lack of localized fluid collection at the bursa only, improving mobility and infectious markers. May need to repeat in the future pending progress.   Encourage elevation, ice, gentle ROM as tolerated.   Pain control prn.   Discussed with Rosmery Wen PA-C.     Thank you for including [facility] in the care of Daniele Nelson. It has been a pleasure participating in his care.      Andrzej Ryan PA-C  [facility]    Principal Problem:    Septic olecranon bursitis of right elbow  Active Problems:    Acid reflux    Mild persistent asthma    Encounter for screening laboratory testing for severe acute respiratory syndrome coronavirus 2 (SARS-CoV-2)       Chief Complaint  Right elbow pain  and swellling     HPI  The patient is seen in orthopedic consultation at the request of Ruth Wen PA-C.  The patient is a 46 year old male with moderate pain and swelling of the right elbow. On 9/12, Frank was pulling carpet out of an old trailer that was dirty and covered with mouse droppings. He developed sudden pain in the right elbow without any specific injury or wound, which caused him to leave early. He developed increased pain and swelling and went to urgent care; he was started on Keflex 500mg QID x 10 days. On 9/14 he was seen by Dr.Scott Ha at [facility] for increased pain and the bursa was aspirated under ultrasound guidance; they obtained 6ml of cloudy, yellow fluid which was sent for cultures. He reported improvement after the aspiration but the next morning he woke up with worsening erythema and edema. He was directed to the [facility] ED by the clinic. In the ED he reported stiffness in the right hand with motion and a tingling sensation. He was started on Ancef and vancomycin, noted to have a low grade fever and leukocytosis and was admitted for further treatment. This morning he noted increased erythema in the right upper arm extending toward the axilla as well as increased stiffness; he states that it is generally worse in the mornings however and he feels it is already improving. He has ongoing pain diffusely in the right elbow with mild tenderness over the olecranon bursa. He states that he feels warm but denies chills. He has better motion in the right elbow and wrist today but notes stiffness and pain with extending his right middle finger. He denies further tingling or numbness in the right hand.       History is obtained from the patient and electronic health record     Past Medical History  Past Medical History:   Diagnosis Date     Esophageal reflux      Hyperlipidemia      Pain in joint, lower leg        Surgical History  Past Surgical History:    Procedure Laterality Date     COLONOSCOPY  5/6/2011    Procedure:COLONOSCOPY; Surgeon:JONAS JAIN; Location:[facility]     ESOPHAGOSCOPY, GASTROSCOPY, DUODENOSCOPY (EGD), COMBINED N/A 3/28/2017    Procedure: COMBINED ESOPHAGOSCOPY, GASTROSCOPY, DUODENOSCOPY (EGD), BIOPSY SINGLE OR MULTIPLE;  Surgeon: Nikolai Valladares MD;  Location: [facility]        Social History  Social History     Socioeconomic History     Marital status:      Spouse name: Not on file     Number of children: 1     Years of education: Not on file     Highest education level: Not on file   Occupational History     Occupation:      Employer: [employer]     Comment: [employer]   Tobacco Use     Smoking status: Never Smoker     Smokeless tobacco: Never Used   Substance and Sexual Activity     Alcohol use: Yes     Comment: weekends     Drug use: No     Sexual activity: Yes     Partners: Female   Other Topics Concern     Parent/sibling w/ CABG, MI or angioplasty before 65F 55M? No   Social History Narrative    August 31, 2021    ENVIRONMENTAL HISTORY: [place] The home is heated with a forced air. They do have central air conditioning. The patient's bedroom is furnished with carpeting in bedroom, fabric window coverings, and animals sleep in bedroom.  Pets inside the house include 2 dog(s). There is history of mice. There is/are 1 smokers in the house.  The house does have a damp basement.      Social Determinants of Health     Financial Resource Strain:      Difficulty of Paying Living Expenses:    Food Insecurity:      Worried About Running Out of Food in the Last Year:      Ran Out of Food in the Last Year:    Transportation Needs:      Lack of Transportation (Medical):      Lack of Transportation (Non-Medical):    Physical Activity:      Days of Exercise per Week:      Minutes of Exercise per Session:    Stress:      Feeling of Stress :    Social Connections:       Frequency of Communication with Friends and Family:      Frequency of Social Gatherings with Friends and Family:      Attends Restorationism Services:      Active Member of Clubs or Organizations:      Attends Club or Organization Meetings:      Marital Status:    Intimate Partner Violence:      Fear of Current or Ex-Partner:      Emotionally Abused:      Physically Abused:      Sexually Abused:        Family History  Family History   Problem Relation Age of Onset     Lipids Mother      Alzheimer Disease Mother      Cancer Father         lymphoma; Sprayed with Angent Orange in [place]     Diabetes Father         type 2     Heart Disease Father      No Known Problems Brother 34        history of  heart problems     Diabetes Maternal Grandfather         Allergies:  Bee, Asa [aspirin], and Chocolate flavor      Current Medications:  Current Facility-Administered Medications   Medication     acetaminophen (TYLENOL) tablet 650 mg    Or     acetaminophen (TYLENOL) Suppository 650 mg     albuterol (PROVENTIL) neb solution 2.5 mg     calcium carbonate (TUMS) chewable tablet 500 mg     lidocaine (LMX4) kit     lidocaine (LMX4) kit     lidocaine 1 % 0.1-1 mL     lidocaine 1 % 0.1-1 mL     naloxone (NARCAN) injection 0.2 mg    Or     naloxone (NARCAN) injection 0.4 mg    Or     naloxone (NARCAN) injection 0.2 mg    Or     naloxone (NARCAN) injection 0.4 mg     ondansetron (ZOFRAN-ODT) ODT tab 4 mg    Or     ondansetron (ZOFRAN) injection 4 mg     oxyCODONE (ROXICODONE) tablet 5 mg     pantoprazole (PROTONIX) EC tablet 40 mg     prochlorperazine (COMPAZINE) injection 10 mg    Or     prochlorperazine (COMPAZINE) tablet 10 mg    Or     prochlorperazine (COMPAZINE) suppository 25 mg     sodium chloride (PF) 0.9% PF flush 3 mL     sodium chloride (PF) 0.9% PF flush 3 mL     sodium chloride (PF) 0.9% PF flush 3 mL     vancomycin (VANCOCIN) 1,250 mg in sodium chloride 0.9 % 250 mL intermittent infusion       Review of Systems:  See  H&P    Physical Exam:  Temp:  [98.1  F (36.7  C)-100.9  F (38.3  C)] 99  F (37.2  C)  Pulse:  [] 89  Resp:  [16-18] 17  BP: (110-130)/(74-95) 126/77  SpO2:  [95 %-97 %] 96 %    General: On examination, the patient is resting comfortably, NAD, awake and alert and oriented to person, place, time, and and general circumstances. Seated in the recliner.   SKIN: Right elbow with diffuse swelling posteriorly over the olecranon and extending distally and proximally in the forearm. Mild fluctuation with palpation over the olecranon bursa. Small scab present. Right elbow is diffusely warm to the touch. Mild erythema diffusely present extending to the mid upper arm, within the previously marked 9/15 lines proximally and 9/16 lines distally to the wrist. Erythema of the volar arm is light and diffuse, from mid upper arm to wrist and is receding slightly from 9/16 markings.   Pulses:  radial pulse is intact and equal bilaterally  Sensation: intact and equal bilaterally to the distal upper extremities.  Tenderness: Mildly TTP over the olecranon and posterior elbow.   ROM: AROM of the elbow 10 to 90 degrees with minimal pain, able to partially supinate and fully pronate. Wrist is slightly decreased in flexion and extension. FROM of the fingers with stiffness. PROM of the elbow from 10 to 90 degrees without pain.  Contralateral side= Full range of motion, Negative joint instability findings, 5/5 motor groups about the joint, Non-tender.     Pertinent Labs  Lab Results: personally reviewed.  Lab Results   Component Value Date    WBC 12.8 (H) 09/16/2021    HGB 13.3 09/16/2021    HCT 41.0 09/16/2021    MCV 85 09/16/2021     09/16/2021     Results for NIECY MC (MRN [id number]) as of 9/16/2021 10:41   Ref. Range 9/15/2021 18:05 9/16/2021 04:24   CRP Inflammation Latest Ref Range: 0.0 - 8.0 mg/L 112.0 (H) 105.0 (H)       Aspirate Aerobic Bacterial Culture Routine with Gram Stain  Order: [id number]  Collected:   9/14/2021 11:47 AM Status:  Preliminary result   Visible to patient:  No (not released) Dx:  Septic bursitis of elbow, right  Specimen Information: Elbow, Right; Aspirate         0 Result Notes  Culture Culture in progress       4+ Staphylococcus aureusAbnormal             Resulting Agency: [facility]           Specimen Collected: 09/14/21 11:47 AM Last Resulted: 09/16/21  7:38 AM             Attestation:  I have reviewed today's vital signs, notes, medications, labs and imaging.     Andrzej Ryan PA-C

## 2021-09-16 NOTE — PLAN OF CARE
Arm continues to be red and swollen. Redness within markings. Radial pulse positive. He reports some numbness in right hand but reports that it is improved from yesterday. He is independent in room. Temperature max 98.9 overnight.

## 2021-09-16 NOTE — PLAN OF CARE
Temperature max 99.8 overnight. Patient requested temperature be taken again as he felt like he was on fire. Temperature 98.7 at that time. He reports adequate pain relief with 5 mg oxycodone.

## 2021-09-16 NOTE — PLAN OF CARE
Patient is alert and oriented, up independently.  Right elbow/arm is red and swollen, painful.  Patient was given 5mg oxycodone and ice, reports helpful.  Receiving IV antibiotics.  Denies nausea, tolerating regular diet.

## 2021-09-16 NOTE — PROGRESS NOTES
[facility]    Medicine Progress Note - Hospitalist Service       Date of Admission:  9/15/2021    Assessment & Plan            Daniele Carter is a 46 year old male admitted on 9/15/2021. He presented to the emergency department for evaluation of worsening right elbow swelling despite oral antibiotic use and was found to have cellulitis and septic olecranon bursitis for which he is being admitted for further evaluation and treatment.    Septic olecranon bursitis of right elbow  Right elbow cellulitis  Acute onset of right elbow pain on 9/12, progressively worsening. Evaluated in urgent care on 9/13 and prescribed Keflex 500 mg qid x 10 days. Evaluated by Sports Med on 9/14, s/p right olecranon bursa arthrocentesis (6 mL yellow, cloudy and blood tinged aspirate). Erythema worsening despite Keflex, so advised to go to the emergency department on 9/15. Presented with low grade fever and leukocytosis.   Aspirate from 9/14 growing gram positive cocci, culture shows staph aureus, pending.   Erythema and swelling worsening today. CRP stable and WBC improved. Will have ortho evaluate to see if further imaging or further intervention/aspiration indicated.  - IV vancomycin (started 9/15/2021)  - following pending aspirate cultures, staph aureus  - Trend CRP: 112 --> 105  - Trend WBC: 16.1 --> 12.8  - Analgesia: prn ice, prn acetaminophen, prn oxycodone   - See note from Dr. Hermes Martinez 9/15/2021 for picture from day of admission  - Erythema border was outlined 9/14, 9/15 and 9/16  - orthopedics consulted today, plan for antibiotics and monitoring for now  ADDENDUM 3:13 PM: extending slightly beyond outline from this morning, bursa more fluctuant than this morning, febrile to 101.4. Plan to obtain blood cultures, continue current management, if continues to worsen would discuss again with orthopedics.     SIRs criteria  Leukocytosis with left shift  Admit WBC 16.1, ANC 13.4. Tmax 100.9. HR  90s-100s. Not clearly septic though noted infection as above due to bursitis and cellulitis.  - Treat infection, follow culture and CRP  - CBC with diff in am     Chest discomfort  History of acid reflux  Reports right sided chest discomfort, similar to previous reflux symptoms though slightly more to the right side than usual. Present when his arm pain becomes more intense. Feels better when he is up walking. Provoked by palpation. Unclear if this is referred pain vs GERD symptoms vs other. Atypical and lower suspicion for chest pain.  - continue home omeprazole (pantoprazole substituted)   - as needed Tums and famotidine  - consider further cardiac work up if more convincing     Hypokalemia   Admit K = 3.2. Improved with repletion.  - K replacement    Mild persistent asthma  Stable respiratory status on admission, no evidence of exacerbation. Managed prior to admission with Arnuity daily (when he remembers) and prn albuterol.  - Hold Arnuity in the hospital  - Prn albuterol neb available     COVID status  COVID PCR negative 9/15/2021  Patient had known COVID infection with positive PCR on 10/29/20  Patient has not been vaccinated, is not interested in vaccine at this time due to history of side effects with many medications, vitamins, and vaccines.      Diet: Regular Diet Adult    DVT Prophylaxis: Low Risk/Ambulatory with no VTE prophylaxis indicated  Herrera Catheter: Not present  Central Lines: None  Code Status: Full Code      Disposition Plan   Expected discharge: 2-3 days recommended to prior living arrangement once infection is improving, antibiotic plan established and no barriers to discharge identified. Inpatient status appropriate given extension of erythema and swelling, will need ongoing IV antibiotics and further culture results prior to discharge.     The patient's care was discussed with the Attending Physician, Dr. Clark Bradford, Patient and orthopedic consult.    Ruth Wen PA-C  Hospitalist  [facility]  Securely message with the [employer] Web Console (learn more here)  Text page via [employer] Paging/Directory  ______________________________________________________________________    Interval History   Pain is persistent. Swelling and erythema is worse this morning. Feels it is overall worse.   Range of motion of hand is improved. The numbness/tingling feels much better.  Feverish this morning. No chills.    He reports a chest discomfort in the right side of his chest that is only present when his pain gets intense. It feels similar to his typical GERD symptoms though it is off to the right instead of the usual central location. It is not exertional and in fact he feels it is better when he gets up an walks around.    No lightheadedness, dizziness, cough, congestion, rhinorrhea, sore throat, shortness of breath, palpitations, abdominal pain, nausea, vomiting, diarrhea, changes in urination.    Data reviewed today: I reviewed all medications, new labs and imaging results over the last 24 hours. I personally reviewed no images or EKG's today.    Physical Exam   Vital Signs: Temp: (!) 100.9  F (38.3  C) Temp src: Oral BP: 124/76 Pulse: 101   Resp: 16 SpO2: 97 % O2 Device: None (Room air)    Weight: 190 lbs 4.11 oz  Constitutional: awake, appears well, sitting up in bed, alert, cooperative, no apparent distress, and appears stated age  ENT: oropharynx is clear and moist  Respiratory: No increased work of breathing, good air exchange, clear to auscultation bilaterally, no crackles or wheezing  Cardiovascular: regular rate and rhythm, and no murmur noted.   GI: Normal bowel sounds, soft, non-distended, non-tender  Genitounirinary: deferred  Skin: right elbow with significant erythema, now extending outside the borders of previously outlined areas to the ventral aspect and down to the wrist. Significant swelling and tenderness, focused on the elbow.  Musculoskeletal: difficulty  with ROM of elbow due to pain. Full ROM of right hand. Pulses are 2+ bilateral in the radial aspect. Cap refill < 2 seconds in the fingers.  Neurologic: Awake, alert, oriented to name, place and time.  Neuropsychiatric: calm, pleasant, cooperative, appropriate thought process/content, short term memory grossly intact.    Data   Recent Labs   Lab 09/16/21  0424 09/15/21  2151 09/15/21  1805 09/15/21  0951 09/15/21  0951   WBC 12.8*  --   --   --  16.1*   HGB 13.3  --   --   --  14.2   MCV 85  --   --   --  85     --   --   --  293     --   --   --  137   POTASSIUM 3.6 3.8 3.3*   < > 3.2*   CHLORIDE 104  --   --   --  104   CO2 25  --   --   --  28   BUN 11  --   --   --  10   CR 0.81  --   --   --  0.78   ANIONGAP 8  --   --   --  5   PAULIE 8.3*  --   --   --  8.4*   GLC 97  --   --   --  130*   ALBUMIN 2.5*  --   --   --   --    PROTTOTAL 7.1  --   --   --   --    BILITOTAL 1.0  --   --   --   --    ALKPHOS 66  --   --   --   --    ALT 25  --   --   --   --    AST 14  --   --   --   --     < > = values in this interval not displayed.     No results found for this or any previous visit (from the past 24 hour(s)).  Medications       ceFAZolin  2 g Intravenous Q8H     pantoprazole  40 mg Oral QAM AC     sodium chloride (PF)  3 mL Intracatheter Q8H     vancomycin  1,250 mg Intravenous Q12H

## 2021-09-16 NOTE — PLAN OF CARE
"Patient temp is 100.9. Reports burning in his right arm and feels like \"pressure, pushing\" sensation on arm and sharp pains. Increased redness extending up right arm, area  of redness is marked. Tylenol po given for fever and pain. Right arm elevated. Dr Bradford web paged with temp and increased redness.   "

## 2021-09-16 NOTE — PROVIDER NOTIFICATION
09/16/21 1442   Vital Signs   Temp (!) 101.4  F (38.6  C)   Temp src Oral   Resp 18   Pulse 106   Pulse Rate Source Monitor   /75   BP Location Left arm   Rosmery ORELLANA udpated regarding temp, increasing redness right    1645 Temp following oral ibuprfen 99.4 orally. Decreased redness.

## 2021-09-17 ENCOUNTER — APPOINTMENT (OUTPATIENT)
Dept: MRI IMAGING | Facility: CLINIC | Age: 46
DRG: 501 | End: 2021-09-17
Attending: PHYSICIAN ASSISTANT
Payer: COMMERCIAL

## 2021-09-17 LAB
ANION GAP SERPL CALCULATED.3IONS-SCNC: 6 MMOL/L (ref 3–14)
BACTERIA ASPIRATE CULT: ABNORMAL
BUN SERPL-MCNC: 13 MG/DL (ref 7–30)
CALCIUM SERPL-MCNC: 8.3 MG/DL (ref 8.5–10.1)
CHLORIDE BLD-SCNC: 106 MMOL/L (ref 94–109)
CO2 SERPL-SCNC: 26 MMOL/L (ref 20–32)
CREAT SERPL-MCNC: 0.72 MG/DL (ref 0.66–1.25)
CRP SERPL-MCNC: 135 MG/L (ref 0–8)
ERYTHROCYTE [DISTWIDTH] IN BLOOD BY AUTOMATED COUNT: 13 % (ref 10–15)
GFR SERPL CREATININE-BSD FRML MDRD: >90 ML/MIN/1.73M2
GLUCOSE BLD-MCNC: 99 MG/DL (ref 70–99)
HCT VFR BLD AUTO: 41.4 % (ref 40–53)
HGB BLD-MCNC: 13.3 G/DL (ref 13.3–17.7)
HOLD SPECIMEN: NORMAL
LACTATE SERPL-SCNC: 0.9 MMOL/L (ref 0.7–2)
MCH RBC QN AUTO: 27.5 PG (ref 26.5–33)
MCHC RBC AUTO-ENTMCNC: 32.1 G/DL (ref 31.5–36.5)
MCV RBC AUTO: 86 FL (ref 78–100)
PLATELET # BLD AUTO: 331 10E3/UL (ref 150–450)
POTASSIUM BLD-SCNC: 3.7 MMOL/L (ref 3.4–5.3)
RBC # BLD AUTO: 4.83 10E6/UL (ref 4.4–5.9)
SODIUM SERPL-SCNC: 138 MMOL/L (ref 133–144)
WBC # BLD AUTO: 12.9 10E3/UL (ref 4–11)

## 2021-09-17 PROCEDURE — 86140 C-REACTIVE PROTEIN: CPT | Performed by: PHYSICIAN ASSISTANT

## 2021-09-17 PROCEDURE — 250N000013 HC RX MED GY IP 250 OP 250 PS 637: Performed by: FAMILY MEDICINE

## 2021-09-17 PROCEDURE — 250N000013 HC RX MED GY IP 250 OP 250 PS 637: Performed by: INTERNAL MEDICINE

## 2021-09-17 PROCEDURE — 85027 COMPLETE CBC AUTOMATED: CPT | Performed by: PHYSICIAN ASSISTANT

## 2021-09-17 PROCEDURE — 83605 ASSAY OF LACTIC ACID: CPT | Performed by: FAMILY MEDICINE

## 2021-09-17 PROCEDURE — 258N000003 HC RX IP 258 OP 636: Performed by: PHYSICIAN ASSISTANT

## 2021-09-17 PROCEDURE — 120N000001 HC R&B MED SURG/OB

## 2021-09-17 PROCEDURE — 36415 COLL VENOUS BLD VENIPUNCTURE: CPT | Performed by: PHYSICIAN ASSISTANT

## 2021-09-17 PROCEDURE — 99233 SBSQ HOSP IP/OBS HIGH 50: CPT | Performed by: INTERNAL MEDICINE

## 2021-09-17 PROCEDURE — 80048 BASIC METABOLIC PNL TOTAL CA: CPT | Performed by: PHYSICIAN ASSISTANT

## 2021-09-17 PROCEDURE — 73223 MRI JOINT UPR EXTR W/O&W/DYE: CPT | Mod: RT

## 2021-09-17 PROCEDURE — A9585 GADOBUTROL INJECTION: HCPCS | Performed by: INTERNAL MEDICINE

## 2021-09-17 PROCEDURE — 250N000013 HC RX MED GY IP 250 OP 250 PS 637: Performed by: PHYSICIAN ASSISTANT

## 2021-09-17 PROCEDURE — 250N000011 HC RX IP 250 OP 636: Performed by: INTERNAL MEDICINE

## 2021-09-17 PROCEDURE — 73223 MRI JOINT UPR EXTR W/O&W/DYE: CPT | Mod: 26 | Performed by: RADIOLOGY

## 2021-09-17 PROCEDURE — 36415 COLL VENOUS BLD VENIPUNCTURE: CPT | Performed by: FAMILY MEDICINE

## 2021-09-17 PROCEDURE — 250N000011 HC RX IP 250 OP 636: Performed by: PHYSICIAN ASSISTANT

## 2021-09-17 PROCEDURE — 255N000002 HC RX 255 OP 636: Performed by: INTERNAL MEDICINE

## 2021-09-17 RX ORDER — GADOBUTROL 604.72 MG/ML
8.5 INJECTION INTRAVENOUS ONCE
Status: COMPLETED | OUTPATIENT
Start: 2021-09-17 | End: 2021-09-17

## 2021-09-17 RX ORDER — CEFAZOLIN SODIUM 2 G/100ML
2 INJECTION, SOLUTION INTRAVENOUS EVERY 8 HOURS
Status: DISCONTINUED | OUTPATIENT
Start: 2021-09-17 | End: 2021-09-20 | Stop reason: HOSPADM

## 2021-09-17 RX ADMIN — OXYCODONE HYDROCHLORIDE 5 MG: 5 TABLET ORAL at 18:52

## 2021-09-17 RX ADMIN — IBUPROFEN 600 MG: 600 TABLET, FILM COATED ORAL at 21:41

## 2021-09-17 RX ADMIN — OXYCODONE HYDROCHLORIDE 5 MG: 5 TABLET ORAL at 12:10

## 2021-09-17 RX ADMIN — IBUPROFEN 600 MG: 600 TABLET, FILM COATED ORAL at 05:37

## 2021-09-17 RX ADMIN — FAMOTIDINE 20 MG: 20 TABLET ORAL at 21:35

## 2021-09-17 RX ADMIN — PANTOPRAZOLE SODIUM 40 MG: 40 TABLET, DELAYED RELEASE ORAL at 05:37

## 2021-09-17 RX ADMIN — GADOBUTROL 8.5 ML: 604.72 INJECTION INTRAVENOUS at 15:30

## 2021-09-17 RX ADMIN — CEFAZOLIN SODIUM 2 G: 2 INJECTION, SOLUTION INTRAVENOUS at 21:35

## 2021-09-17 RX ADMIN — VANCOMYCIN HYDROCHLORIDE 1250 MG: 10 INJECTION, POWDER, LYOPHILIZED, FOR SOLUTION INTRAVENOUS at 11:55

## 2021-09-17 RX ADMIN — IBUPROFEN 600 MG: 600 TABLET, FILM COATED ORAL at 16:06

## 2021-09-17 RX ADMIN — OXYCODONE HYDROCHLORIDE 5 MG: 5 TABLET ORAL at 07:22

## 2021-09-17 RX ADMIN — FAMOTIDINE 20 MG: 20 TABLET ORAL at 07:53

## 2021-09-17 ASSESSMENT — MIFFLIN-ST. JEOR: SCORE: 1718.63

## 2021-09-17 ASSESSMENT — ACTIVITIES OF DAILY LIVING (ADL)
ADLS_ACUITY_SCORE: 3

## 2021-09-17 NOTE — PROGRESS NOTES
[facility] Internal Medicine Progress Note     Date of Service (when I saw the patient): 09/17/2021    REASON FOR ADMISSION / INTERVAL HISTORY:  Daniele Carter is a 46 year old male admitted on 9/15/2021. He presented to the emergency department for evaluation of worsening right elbow swelling despite oral antibiotic use  Continues to have severe pain/ tenderness in the elbow. See details below.     ASSESSMENT/PLAN:     Septic olecranon bursitis of right elbow  Right elbow cellulitis  Acute onset of right elbow pain on 9/12, progressively worsening. Evaluated in urgent care on 9/13 and prescribed Keflex 500 mg qid x 10 days. Evaluated by Sports Med on 9/14, s/p right olecranon bursa arthrocentesis (6 mL yellow, cloudy and blood tinged aspirate). Erythema worsening despite Keflex, so advised to go to the emergency department on 9/15. Presented with low grade fever and leukocytosis. Aspirate from 9/14 growing gram positive cocci, culture shows MSSA  Erythema and swelling worsening today. CRP worse and WBC still elevated. Ortho PA seen. Will discuss with MD -may need I&d.  -continue vanco for now.      SIRs criteria  Leukocytosis with left shift  2ndry to above. Admit WBC 16.1, ANC 13.4. Tmax 100.9. HR 90s-100s. Not clearly septic though noted infection as above due to bursitis and cellulitis.  Continue rx and plan as above.     Chest discomfort  History of acid reflux  Resolved. Likely due top GERD. Continue home PPI.     Hypokalemia   Admit K = 3.2. Improved with repletion.  - K replacement     Mild persistent asthma  Stable respiratory status on admission, no evidence of exacerbation. Managed prior to admission with Arnuity daily (when he remembers) and prn albuterol.  - Hold Arnuity in the hospital  - Prn albuterol neb available      COVID status  COVID PCR negative 9/15/2021  Patient had known COVID infection with positive PCR on 10/29/20  Patient has not been vaccinated, is not interested in vaccine at  "this time due to history of side effects with many medications, vitamins, and vaccines.        Disposition Plan   Expected discharge: 2-3 days recommended to prior living arrangement once infection is improving-ortho plan finalized    INES VALDEZ MD   Pg [phone]    DVT Prhylaxis: Low Risk/Ambulatory with no VTE prophylaxis indicated  Code Status: Full Code    ROS:  As described in A/P and Exam.  Otherwise ALL are  negative.    PHYSICAL EXAM:  All vitals have been reviewed    Blood pressure 138/83, pulse 106, temperature 98.3  F (36.8  C), temperature source Oral, resp. rate 18, height 1.702 m (5' 7\"), weight 87 kg (191 lb 12.8 oz), SpO2 99 %.    I/O this shift:  In: 1500 [P.O.:1500]  Out: -     GENERAL APPEARANCE: healthy, alert and no distress  EYES: conjunctiva clear, eyes grossly normal  HENT: external ears and nose normal   RESP: lungs clear to auscultation - no rales, rhonchi or wheezes  CV: regular rate and rhythm, normal S1 S2, no S3 or S4 and no murmur, click or rub   ABDOMEN: soft, nontender, no HSM or masses and bowel sounds normal  MS: no clubbing, cyanosis; R elbow edema/ erythema from lower arm to upper forearm in circumferential distribution.   SKIN: clear without significant rashes or lesions  NEURO: -non-focal moves all 4 extr    ROUTINE  LABS (Last four results)  CMP  Recent Labs   Lab 09/17/21  0439 09/16/21  0424 09/15/21  2151 09/15/21  1805 09/15/21  0951 09/15/21  0951    137  --   --   --  137   POTASSIUM 3.7 3.6 3.8 3.3*   < > 3.2*   CHLORIDE 106 104  --   --   --  104   CO2 26 25  --   --   --  28   ANIONGAP 6 8  --   --   --  5   GLC 99 97  --   --   --  130*   BUN 13 11  --   --   --  10   CR 0.72 0.81  --   --   --  0.78   GFRESTIMATED >90 >90  --   --   --  >90   PAULIE 8.3* 8.3*  --   --   --  8.4*   PROTTOTAL  --  7.1  --   --   --   --    ALBUMIN  --  2.5*  --   --   --   --    BILITOTAL  --  1.0  --   --   --   --    ALKPHOS  --  66  --   --   --   --    AST  --  14  --   --  "  --   --    ALT  --  25  --   --   --   --     < > = values in this interval not displayed.     CBC  Recent Labs   Lab 09/17/21  0439 09/16/21  0424 09/15/21  0951   WBC 12.9* 12.8* 16.1*   RBC 4.83 4.83 5.17   HGB 13.3 13.3 14.2   HCT 41.4 41.0 43.8   MCV 86 85 85   MCH 27.5 27.5 27.5   MCHC 32.1 32.4 32.4   RDW 13.0 13.1 13.0    303 293     INRNo lab results found in last 7 days.  Arterial Blood GasNo lab results found in last 7 days.    No results found for this or any previous visit (from the past 24 hour(s)).

## 2021-09-17 NOTE — PROGRESS NOTES
Antimicrobial Stewardship Team Note    Antimicrobial Stewardship Program - A joint venture between [facility] and  Physicians to optimize antibiotic management.  NOT a formal consult - Restricted Antimicrobial Review     Patient: Daniele Carter  MRN: [id number]  Allergies: Bee, Asa [aspirin], and Chocolate flavor    Brief Summary: Daniele Carter is a 46 year old male with no relevant PMH who was admitted on 9/15/2021 with several days of right posterior elbow redness, swelling and pain.    Patient reported pain and swelling began after tearing apart an old camper, which included spending time on his hands, knees and elbows. Pain, swelling, and redness continued to worsen despite being on Keflex 500 mg QID for 2 days. On 9/14, he was seen in the orthopedic sports medicine clinic and underwent aspiration of 6 mL of fluid from the right elbow olecranon bursa. Fluid reported to be yellow, cloudy and blood-tinged with gram stain showing 4+ WBCs and GPCs. Erythema was noted to spread beyond markings and patient was admitted for further management. Empiric cefazolin and vancomycin were initiated. On presentation, patient was afebrile with VSS and elevated WBC (16.1) and . Patient later spiked fevers (tmax 101.4F) and tachycardic. Cefazolin was stopped on 9/16.    Today VS remain stable and afebrile, WBC trending down but CRP elevated at 135. Per notes, patient is reporting increased pain and stiffness at the elbow, proximal forearm and wrist with increased erythema in the wrist and difficulty making a fist. Denied numbness or paresthesias. Ortho thinking might need furher imaging with possible aspiration.   Elbow aspiration cultures from 9/14 finalized as MSSA. Blood cultures from 9/16 are NGTD x1 day.           Active Anti-infective Medications   (From admission, onward)                 Start     Stop    09/15/21 2300  vancomycin (VANCOCIN) injection  1,250 mg,   Intravenous,   EVERY 12  HOURS     Skin and Soft Tissue Infection        --                  Assessment: Septic bursitis of right elbow   Patient presented with right elbow septic olecranon bursitis and forearm cellulitis. Pain, swelling, and erythema continues to increase with elevated inflammatory markers. However, leukocytosis and fever curve improving. Tachycardia is likely due to increased pain and swelling. Blood culture are without growth to date and aspiration from elbow is growing MSSA. Recommend stopping vancomycin as there is no evidence of MRSA and start cefazolin for targeted MSSA treatment. Duration of therapy will depend on clinical improvement and need for surgical management for source control or imaging. Consider reaching out to ID curbside as needed for further recommendations.     Recommendations:  Stop vancomycin   Start cefazolin 2 g every 8 hours   Duration of therapy to be based on clinica improvement and need for surgical management and/or imaging    Pharmacy took the following actions: Called/paged provider, Electronic note created.    Discussed with ID Staff: Discussed with ID Staff: Discussed with ID Staff: Omar Vora MD, M.Med.Sc  Marley Elmore, PharmD, BCIDP  Pager: [phone]      Vital Signs/Clinical Features:  Vitals         09/15 0700  -  09/16 0659 09/16 0700  -  09/17 0659 09/17 0700  -  09/17 1335   Most Recent    Temp ( F) 98.1 -  99.8    97.8 -  101.4    98.1 -  98.3     98.3 (36.8)    Pulse 95 -  117    89 -  106    97 -  106     106    Resp 16 -  18    16 -  18      18     18    /74 -  144/93    116/80 -  130/84    127/85 -  138/83     138/83    SpO2 (%) 95 -  98    96 -  99    98 -  99     99            Labs  Estimated Creatinine Clearance: 138 mL/min (based on SCr of 0.72 mg/dL).  Recent Labs   Lab Test 12/20/17  0811 03/25/20  1406 10/28/20  1014 09/15/21  0951 09/16/21  0424 09/17/21  0439   CR 0.68 0.65* 0.78 0.78 0.81 0.72       Recent Labs   Lab Test 03/25/20  1406  03/25/20  1406 10/28/20  1014 08/31/21  0753 09/15/21  0951 09/16/21  0424 09/17/21  0439   WBC 7.5  --  5.4 7.4 16.1* 12.8* 12.9*   ANEU 4.5  --  3.2  --   --   --   --    ALYM 2.1  --  1.1  --   --   --   --    BRIANNE 0.7  --  1.0  --   --   --   --    AEOS 0.1  --  0.1  --   --   --   --    HGB 15.8  --  15.0 14.8 14.2 13.3 13.3   HCT 48.3  --  47.0 46.0 43.8 41.0 41.4   MCV 83  --  85 86 85 85 86      < > 331 367 293 303 331    < > = values in this interval not displayed.       Recent Labs   Lab Test 12/20/17  0811 03/25/20  1406 10/28/20  1014 09/16/21  0424   BILITOTAL 0.8 0.7 1.0 1.0   ALKPHOS 86 86 97 66   ALBUMIN 3.8 4.0 3.8 2.5*   AST 24 23 33 14   ALT 43 49 56 25       Recent Labs   Lab Test 09/15/21  0951 09/15/21  1805 09/16/21  0424 09/17/21  0439 09/17/21  0603   LACT 1.0 0.6*  --   --  0.9   CRP  --  112.0* 105.0* 135.0*  --              Culture Results:  7-Day Micro Results       Procedure Component Value Units Date/Time    Blood Culture Hand, Left [[id number]]  (Normal) Collected: 09/16/21 1533    Order Status: Completed Lab Status: Preliminary result Updated: 09/17/21 1032    Specimen: Blood from Hand, Left      Culture No growth after 12 hours    Blood Culture Hand, Right [[id number]]  (Normal) Collected: 09/16/21 1533    Order Status: Completed Lab Status: Preliminary result Updated: 09/17/21 1032    Specimen: Blood from Hand, Right      Culture No growth after 12 hours    Aspirate Aerobic Bacterial Culture Routine with Gram Stain [[id number]]  (Abnormal)  (Susceptibility) Collected: 09/14/21 1147    Order Status: Completed Lab Status: Final result Updated: 09/17/21 7338    Specimen: Aspirate from Elbow, Right      Culture 4+ Staphylococcus aureus    Susceptibility       Staphylococcus aureus (1)       Antibiotic Interpretation Sensitivity Method Status    Oxacillin Susceptible 0.5 ug/mL DENA Final    Gentamicin Susceptible <=0.5 ug/mL DENA Final    Ciprofloxacin  [*]  Susceptible <=0.5  ug/mL DENA Final    Levofloxacin  [*]  Susceptible 0.25 ug/mL DENA Final    Moxifloxacin  [*]  Susceptible <=0.25 ug/mL DENA Final    Inducible macrolide resistance test  [*]  Negative Negative ug/mL DENA Final    Erythromycin Susceptible <=0.25 ug/mL DENA Final    Clindamycin Susceptible <=0.25 ug/mL DENA Final    Quinupristin/Dalfopristin  [*]  Susceptible <=0.25 ug/mL DENA Final    Linezolid  [*]  Susceptible 2.0 ug/mL DENA Final    Vancomycin Susceptible <=0.5 ug/mL DENA Final    Tetracycline Susceptible <=1.0 ug/mL DENA Final    Tigecycline  [*]  Susceptible <=0.12 ug/mL DENA Final    Nitrofurantoin  [*]  Susceptible <=16.0 ug/mL DENA Final    Rifampin  [*]  Susceptible <=0.5 ug/mL DENA Final    Trimethoprim/Sulfamethoxazole Susceptible <=0.5/9.5 ug/mL DENA Final               [*]  Suppressed Antibiotic                   Gram stain [[id number]]  (Abnormal) Collected: 09/14/21 1147    Order Status: Completed Lab Status: Final result Updated: 09/15/21 0125    Specimen: Aspirate from Elbow, Right      Gram Stain Result 2+ Gram positive cocci      4+ WBC seen     Comment: Predominantly PMNs               Recent Labs   Lab Test 10/28/20  1229   URINEPH 6.0   NITRITE Negative   LEUKEST Negative   WBCU <1                         Imaging: No results found.

## 2021-09-17 NOTE — PLAN OF CARE
Temperature max 99.6 this shift. He reports pain is 3/10. He reports adequate relief of pain with ibuprofen. Radial pulses positive. Denies numbness and tingling. Denies chest discomfort tonight. Right arm with redness within new markings. He is receiving vancomycin every 12 hours. Saline locked between doses.

## 2021-09-17 NOTE — PROGRESS NOTES
"[facility] Orthopaedics Progress Note    Subjective:    Pt reports increased pain and stiffness at the elbow, proximal forearm and wrist today. It had been improving somewhat yesterday afternoon but was worse when he woke up. He also noted increased erythema in the wrist today. It is harder to move the wrist and make a fist today. His elbow motion is about the same.     Neuro:  Patient denies new onset numbness or paresthesias      Objective:  Blood pressure 127/85, pulse 97, temperature 98.1  F (36.7  C), temperature source Oral, resp. rate 18, height 1.702 m (5' 7\"), weight 87 kg (191 lb 12.8 oz), SpO2 98 %.    Patient Vitals for the past 24 hrs:   BP Temp Temp src Pulse Resp SpO2 Weight   09/17/21 0744 127/85 98.1  F (36.7  C) Oral 97 18 98 % --   09/17/21 0448 116/80 98  F (36.7  C) Oral 103 18 99 % 87 kg (191 lb 12.8 oz)   09/16/21 2243 124/81 99.6  F (37.6  C) Oral 100 16 98 % --   09/16/21 1824 130/84 97.8  F (36.6  C) Oral 97 16 97 % --   09/16/21 1638 -- 99.4  F (37.4  C) Oral -- -- -- --   09/16/21 1442 121/75 (!) 101.4  F (38.6  C) Oral 106 18 -- --   09/16/21 1053 126/77 99  F (37.2  C) Oral 89 17 96 % --       Gen: A&O x 3. NAD. Appears comfortable, seated in the recliner with right arm elevated on 2 pillows.   Wound status: Right elbow with mildly increased swelling at the olecranon and dorsal proximal forearm. Mild swelling extending to the right wrist with increased erythema over the forearm. No extension past new markings from this AM. Mild increased fluctuance at the olecranon bursa and increased firmness in the forearm. TTP over the olecranon bursa and proximal dorsal forearm.    Circulation, motion and sensation: Decreased right wrist flexion and extension actively; mildly increased pain with passive flexion and extension. Able to make a loose fist with mild forearm pain. AROM of the elbow unchanged, 10 to 90 degrees with decreased supination.     Pertinent Labs   Lab Results: personally " reviewed.     Recent Labs   Lab Test 09/17/21  0439 09/16/21  0424 09/15/21  1805 09/15/21  0951   HGB 13.3 13.3  --  14.2   HCT 41.4 41.0  --  43.8   MCV 86 85  --  85    303  --  293    137  --  137   .0* 105.0* 112.0*  --      Assessment:  Right elbow septic olecranon bursitis, forearm cellulitis with increased swelling and erythema    Plan:   Update sent to , awaiting further discussed to determine next steps. May need further imaging, possible aspiration.   Continue IV vancomycin per medicine.   Encouraged elevation, ice and gentle ROM as tolerated.   Pain control prn.     Report completed by:  Andrzej Ryan PA-C  Date: 9/17/2021  Time: 9:22 AM    ADDENDUM  Discussed with . Increasing swelling and pain likely due to ongoing cellulitis, but warrants further evaluation for fluid collection.  Will order MRI of the right elbow with contrast to evaluate for fluid collection. If present, likely will recommend US guided aspiration by radiology.  Apply Ace wrap/tubi  sleeve to the arm, starting at the hand and extending up to the axilla for compression of fluid and to increase antibiotic concentration in the soft tissues.  Will continue to follow.     Andrzej Ryan PA-C..................12:24 PM 9/17/21

## 2021-09-18 LAB
BASOPHILS # BLD AUTO: 0.1 10E3/UL (ref 0–0.2)
BASOPHILS NFR BLD AUTO: 1 %
CRP SERPL-MCNC: 98.2 MG/L (ref 0–8)
EOSINOPHIL # BLD AUTO: 0.3 10E3/UL (ref 0–0.7)
EOSINOPHIL NFR BLD AUTO: 2 %
ERYTHROCYTE [DISTWIDTH] IN BLOOD BY AUTOMATED COUNT: 13.2 % (ref 10–15)
HCT VFR BLD AUTO: 42.1 % (ref 40–53)
HGB BLD-MCNC: 13.5 G/DL (ref 13.3–17.7)
IMM GRANULOCYTES # BLD: 0.1 10E3/UL
IMM GRANULOCYTES NFR BLD: 1 %
LACTATE SERPL-SCNC: 0.9 MMOL/L (ref 0.7–2)
LYMPHOCYTES # BLD AUTO: 1.4 10E3/UL (ref 0.8–5.3)
LYMPHOCYTES NFR BLD AUTO: 11 %
MCH RBC QN AUTO: 27.4 PG (ref 26.5–33)
MCHC RBC AUTO-ENTMCNC: 32.1 G/DL (ref 31.5–36.5)
MCV RBC AUTO: 86 FL (ref 78–100)
MONOCYTES # BLD AUTO: 1.1 10E3/UL (ref 0–1.3)
MONOCYTES NFR BLD AUTO: 9 %
NEUTROPHILS # BLD AUTO: 10 10E3/UL (ref 1.6–8.3)
NEUTROPHILS NFR BLD AUTO: 76 %
NRBC # BLD AUTO: 0 10E3/UL
NRBC BLD AUTO-RTO: 0 /100
PLATELET # BLD AUTO: 327 10E3/UL (ref 150–450)
POTASSIUM BLD-SCNC: 3.8 MMOL/L (ref 3.4–5.3)
RBC # BLD AUTO: 4.92 10E6/UL (ref 4.4–5.9)
VANCOMYCIN SERPL-MCNC: 2.2 MG/L
WBC # BLD AUTO: 13 10E3/UL (ref 4–11)

## 2021-09-18 PROCEDURE — 36415 COLL VENOUS BLD VENIPUNCTURE: CPT | Performed by: INTERNAL MEDICINE

## 2021-09-18 PROCEDURE — 250N000011 HC RX IP 250 OP 636: Performed by: INTERNAL MEDICINE

## 2021-09-18 PROCEDURE — 84132 ASSAY OF SERUM POTASSIUM: CPT | Performed by: INTERNAL MEDICINE

## 2021-09-18 PROCEDURE — 86140 C-REACTIVE PROTEIN: CPT | Performed by: INTERNAL MEDICINE

## 2021-09-18 PROCEDURE — 99233 SBSQ HOSP IP/OBS HIGH 50: CPT | Performed by: INTERNAL MEDICINE

## 2021-09-18 PROCEDURE — 250N000013 HC RX MED GY IP 250 OP 250 PS 637: Performed by: INTERNAL MEDICINE

## 2021-09-18 PROCEDURE — 250N000013 HC RX MED GY IP 250 OP 250 PS 637: Performed by: FAMILY MEDICINE

## 2021-09-18 PROCEDURE — 80202 ASSAY OF VANCOMYCIN: CPT | Performed by: INTERNAL MEDICINE

## 2021-09-18 PROCEDURE — 120N000001 HC R&B MED SURG/OB

## 2021-09-18 PROCEDURE — 250N000013 HC RX MED GY IP 250 OP 250 PS 637: Performed by: PHYSICIAN ASSISTANT

## 2021-09-18 PROCEDURE — 85025 COMPLETE CBC W/AUTO DIFF WBC: CPT | Performed by: INTERNAL MEDICINE

## 2021-09-18 PROCEDURE — 83605 ASSAY OF LACTIC ACID: CPT | Performed by: INTERNAL MEDICINE

## 2021-09-18 RX ADMIN — CEFAZOLIN SODIUM 2 G: 2 INJECTION, SOLUTION INTRAVENOUS at 14:41

## 2021-09-18 RX ADMIN — ACETAMINOPHEN 650 MG: 325 TABLET, FILM COATED ORAL at 20:39

## 2021-09-18 RX ADMIN — CEFAZOLIN SODIUM 2 G: 2 INJECTION, SOLUTION INTRAVENOUS at 21:10

## 2021-09-18 RX ADMIN — PANTOPRAZOLE SODIUM 40 MG: 40 TABLET, DELAYED RELEASE ORAL at 06:08

## 2021-09-18 RX ADMIN — FAMOTIDINE 20 MG: 20 TABLET ORAL at 08:06

## 2021-09-18 RX ADMIN — ACETAMINOPHEN 650 MG: 325 TABLET, FILM COATED ORAL at 15:47

## 2021-09-18 RX ADMIN — IBUPROFEN 600 MG: 600 TABLET, FILM COATED ORAL at 08:06

## 2021-09-18 RX ADMIN — OXYCODONE HYDROCHLORIDE 5 MG: 5 TABLET ORAL at 08:06

## 2021-09-18 RX ADMIN — OXYCODONE HYDROCHLORIDE 5 MG: 5 TABLET ORAL at 14:48

## 2021-09-18 RX ADMIN — FAMOTIDINE 20 MG: 20 TABLET ORAL at 20:43

## 2021-09-18 RX ADMIN — CEFAZOLIN SODIUM 2 G: 2 INJECTION, SOLUTION INTRAVENOUS at 06:08

## 2021-09-18 ASSESSMENT — ACTIVITIES OF DAILY LIVING (ADL)
ADLS_ACUITY_SCORE: 3

## 2021-09-18 ASSESSMENT — MIFFLIN-ST. JEOR: SCORE: 1700.63

## 2021-09-18 NOTE — PLAN OF CARE
"Patient is A&O x4. Patient has remains afebrile. RUE elevated, ice applied to elbow patient did not tolerated ace bandage. Up Ind to bathroom. Patient has remained afebrile all day today. Right upper extremity is hot to touch, red, and appears swollen. Tachycardic HR running at , then went down to the 70s. Denies any nausea. Pain controlled with oxy and ibuprofen, patient has been sleeping has not requested any pain medication this overnight. Prefers to sleep in the chair states that bed is uncomfortable. Potassium was 3.8 this morning, recheck tomorrow morning.     /72 (BP Location: Left arm)   Pulse 70   Temp 98.8  F (37.1  C) (Oral)   Resp 18   Ht 1.702 m (5' 7\")   Wt 87 kg (191 lb 12.8 oz)   SpO2 98%   BMI 30.04 kg/m      Hailey Lafleur RN on 9/18/2021 at 2:00 AM    "

## 2021-09-18 NOTE — PROGRESS NOTES
[facility] Internal Medicine Progress Note     Date of Service (when I saw the patient): 09/18/2021    REASON FOR ADMISSION / INTERVAL HISTORY:  Daniele Carter is a 46 year old male admitted on 9/15/2021. He presented to the emergency department for evaluation of worsening right elbow swelling despite oral antibiotic use  Continues to have severe pain/ tenderness in the elbow. Worse since yesterday. See details below.    MRI R elbow- Impression:     1. Encapsulated fluid collection at the posterior lateral aspect of  the elbow is consistent with olecranon bursitis the sterility of which  can be confirmed with aspiration.     2. Extensive subcutaneous edema and enhancement extending along the  forearm consistent with cellulitis. Findings include prominent,  partially loculated fluid along the lateral aspect of the midforearm.  Additional abscess difficult to exclude.    ASSESSMENT/PLAN:     Septic olecranon bursitis of right elbow  Right elbow cellulitis  Acute onset of right elbow pain on 9/12, progressively worsening. Evaluated in urgent care on 9/13 and prescribed Keflex 500 mg qid x 10 days. Evaluated by Sports Med on 9/14, s/p right olecranon bursa arthrocentesis (6 mL yellow, cloudy and blood tinged aspirate). Erythema worsening despite Keflex, so advised to go to the emergency department on 9/15. Presented with low grade fever and leukocytosis. Aspirate from 9/14 growing gram positive cocci, culture shows MSSA  Erythema and swelling worsening . CRP better  and WBC still elevated. MRI as above.   -continue vanco for now. To OR in AM per ortho.      SIRs criteria  Leukocytosis with left shift  2ndry to above. Admit WBC 16.1, ANC 13.4. Tmax 100.9. HR 90s-100s. Not clearly septic though noted infection as above due to bursitis and cellulitis.  Continue rx and plan as above.     Chest discomfort  History of acid reflux  Resolved. Likely due top GERD. Continue home PPI.     Hypokalemia   Admit K = 3.2.  "Improved with repletion.  - K replacement     Mild persistent asthma  Stable respiratory status on admission, no evidence of exacerbation. Managed prior to admission with Arnuity daily (when he remembers) and prn albuterol.  - Hold Arnuity in the hospital  - Prn albuterol neb available      COVID status  COVID PCR negative 9/15/2021  Patient had known COVID infection with positive PCR on 10/29/20  Patient has not been vaccinated, is not interested in vaccine at this time due to history of side effects with many medications, vitamins, and vaccines.        Disposition Plan   Expected discharge: 2-3 days recommended to prior living arrangement once surgery done and infection is improving-.    INES VALDEZ MD   Pg [phone]    DVT Prhylaxis: Low Risk/Ambulatory with no VTE prophylaxis indicated  Code Status: Full Code    ROS:  As described in A/P and Exam.  Otherwise ALL are  negative.    PHYSICAL EXAM:  All vitals have been reviewed    Blood pressure 124/72, pulse 93, temperature 98.4  F (36.9  C), temperature source Oral, resp. rate 18, height 1.702 m (5' 7\"), weight 85.2 kg (187 lb 13.3 oz), SpO2 100 %.    No intake/output data recorded.    GENERAL APPEARANCE: healthy, alert and no distress  EYES: conjunctiva clear, eyes grossly normal  HENT: external ears and nose normal   RESP: lungs clear to auscultation - no rales, rhonchi or wheezes  CV: regular rate and rhythm, normal S1 S2, no S3 or S4 and no murmur, click or rub   ABDOMEN: soft, nontender, no HSM or masses and bowel sounds normal  MS: no clubbing, cyanosis; R elbow edema/ erythema from lower arm to upper forearm in circumferential distribution.   SKIN: clear without significant rashes or lesions  NEURO: -non-focal moves all 4 extr    ROUTINE  LABS (Last four results)  CMP  Recent Labs   Lab 09/18/21  0442 09/17/21  0439 09/16/21  0424 09/15/21  2151 09/15/21  1805 09/15/21  0951   NA  --  138 137  --   --  137   POTASSIUM 3.8 3.7 3.6 3.8   < > 3.2* "   CHLORIDE  --  106 104  --   --  104   CO2  --  26 25  --   --  28   ANIONGAP  --  6 8  --   --  5   GLC  --  99 97  --   --  130*   BUN  --  13 11  --   --  10   CR  --  0.72 0.81  --   --  0.78   GFRESTIMATED  --  >90 >90  --   --  >90   PAULIE  --  8.3* 8.3*  --   --  8.4*   PROTTOTAL  --   --  7.1  --   --   --    ALBUMIN  --   --  2.5*  --   --   --    BILITOTAL  --   --  1.0  --   --   --    ALKPHOS  --   --  66  --   --   --    AST  --   --  14  --   --   --    ALT  --   --  25  --   --   --     < > = values in this interval not displayed.     CBC  Recent Labs   Lab 09/18/21  0442 09/17/21  0439 09/16/21  0424 09/15/21  0951   WBC 13.0* 12.9* 12.8* 16.1*   RBC 4.92 4.83 4.83 5.17   HGB 13.5 13.3 13.3 14.2   HCT 42.1 41.4 41.0 43.8   MCV 86 86 85 85   MCH 27.4 27.5 27.5 27.5   MCHC 32.1 32.1 32.4 32.4   RDW 13.2 13.0 13.1 13.0    331 303 293     INRNo lab results found in last 7 days.  Arterial Blood GasNo lab results found in last 7 days.    Recent Results (from the past 24 hour(s))   MR Elbow Right w/o & w Contrast    Narrative    MR right  without contrast 9/17/2021 3:30 PM    Techniques: Multiplanar multisequence imaging of the bilateral thighs  was obtained without  administration of intra-articular or intravenous  contrast using routing protocol.    History: Soft tissue infection suspected, elbow, no prior imaging    Additional History from EMR: 46-year-old male    Comparison: No relevant prior exam available.    Findings: Exam limited by susceptibility artifact most prominent at  the superior field-of-view. Images are also degraded by wraparound  artifact.    Fluid accumulation on the posterior lateral aspect of the elbow  measuring approximately 3.2 x 1.9 x 3.7 cm, see axial image 22 and  coronal image 5.     Extensive surrounding subcutaneous edema and enhancement extending  along the forearm. This includes a septated fluid accumulation along  the posterior lateral forearm measuring 4.1 x 2.1 x  5.5 cm, see axial  image 27.    Edema extends through the subcutaneous fat. There is mild enhancement  of the superficial muscular fascia. No evidence of extension into the  intramuscular fascia or tissues.    Osseous structures  Osseous structures: No fracture, stress reaction, avascular necrosis,  or focal osseous lesion is seen.    Joint and periarticular soft tissue    Internal derangement of joints are not well assessed owing to chosen  field of view.  The elbow joint appears congruent. No large elbow  joint effusion. Partially visualized wrist joint is unremarkable.     Muscles and tendons  Muscles: Visualized muscles are unremarkable without evidence of  muscle strain, atrophy or mass. No substantial intramuscular edema or  enhancement.    Tendons: Visualized tendons are grossly intact.    Nerves:  Nerves of the forearm are grossly unremarkable, including the median  and ulnar nerves.    Other Findings:  Presumed epicondylar lymph node measuring 7 mm short axis on series  103 image 15      Impression    Impression:    1. Encapsulated fluid collection at the posterior lateral aspect of  the elbow is consistent with olecranon bursitis the sterility of which  can be confirmed with aspiration.    2. Extensive subcutaneous edema and enhancement extending along the  forearm consistent with cellulitis. Findings include prominent,  partially loculated fluid along the lateral aspect of the midforearm.  Additional abscess difficult to exclude.    I have personally reviewed the examination and initial interpretation  and I agree with the findings.    MONICA HENLEY MD (Joe)         SYSTEM ID:  [id number]

## 2021-09-18 NOTE — PLAN OF CARE
Patient has remained afebrile all day today. He intermittently gets hot and sweats. RUE elevated, ace applied but removed two hours later due to discomfort, ice applied to elbow. Oxycodone and ibuprofen prn for pain management. Up ad duyen in BR. Voiding well, had a bowel movement, appetite is fair.

## 2021-09-18 NOTE — PROGRESS NOTES
"                  [facility] Progress Note      Subjective:    Pain: moderate, pt reports continued pain throughout RUE, most notable at elbow.  He reports the ace bandage exacerbated the pain in arm and elbow, and decided to remove the bandage. He reports erythema in hand has improved some. He reports movement in his arm is better today.    Chest pain, SOB:  No  Neuro:  Patient denies new onset numbness or paresthesias    Objective:  Blood pressure 124/72, pulse 93, temperature 98.4  F (36.9  C), temperature source Oral, resp. rate 18, height 1.702 m (5' 7\"), weight 85.2 kg (187 lb 13.3 oz), SpO2 100 %.    Patient Vitals for the past 24 hrs:   BP Temp Temp src Pulse Resp SpO2 Weight   09/18/21 0620 124/72 98.4  F (36.9  C) Oral 93 18 100 % 85.2 kg (187 lb 13.3 oz)   09/17/21 2310 128/72 98.8  F (37.1  C) Oral 70 18 98 % --   09/17/21 1930 130/79 98.3  F (36.8  C) Oral 97 18 97 % --   09/17/21 1603 127/83 98.4  F (36.9  C) Oral 106 16 98 % --   09/17/21 1116 138/83 98.3  F (36.8  C) Oral 106 18 99 % --       Wt Readings from Last 4 Encounters:   09/18/21 85.2 kg (187 lb 13.3 oz)   09/14/21 83.9 kg (185 lb)   09/13/21 83.9 kg (185 lb)   08/31/21 86.7 kg (191 lb 2.2 oz)       Gen: A&O x 3. NAD. Seated and appears comfortable  Right elbow with swelling at the olecranon and dorsal proximal forearm. Mild swelling extending to the right wrist and hand.  Erythema at right elbow extending proximally into triceps/biceps and distally into forearm.  Erythema not significantly changed from previous markings yesterday. Notable area of fluctuance at the olecranon bursa and increased firmness in the forearm. TTP over the olecranon bursa and proximal dorsal forearm.    Circulation, motion and sensation: intact, painful right wrist flexion and extension and supination  Limited ROM at elbow.    Pertinent Labs   Lab Results: personally reviewed.     Recent Labs   Lab Test 09/18/21  0442 09/17/21  0439 09/16/21  0424 " 09/15/21  1805 09/15/21  0951   HGB 13.5 13.3 13.3  --  14.2   HCT 42.1 41.4 41.0  --  43.8   MCV 86 86 85  --  85    331 303  --  293   NA  --  138 137  --  137   CRP 98.2* 135.0* 105.0*   < >  --     < > = values in this interval not displayed.       Plan: Assessment:  Right elbow septic olecranon bursitis with abscess forearm cellulitis with increased swelling and erythema     Plan:   Anticipating washout in the morning 9/19 with Dr. Chi -On call Surgeon with [facility]  NPO after midnight  Continue IV vancomycin per medicine.  ACE wrap  From hand to axilla as tolerated  Encouraged elevation, ice and gentle ROM as tolerated.   Pain control prn.     Report completed by:  Saida Aranda PA-C  Date: 9/18/2021  Time: 11:08 AM

## 2021-09-18 NOTE — PLAN OF CARE
Temp 100.0 this afternoon and tylenol given.  Ace wrap placed by ortho today.  Has kept arm elevated and used ice packs and oxycodone prn.  Expresses nervousness regarding procedure tomorrow and hopes to wake up to find his arm healed by the morning.  Independent in room.  Orders placed for NPO at 0000 based on conversation with ortho PA.  Plan is for OR tomorrow around 10:30 for washout.

## 2021-09-19 ENCOUNTER — ANESTHESIA EVENT (OUTPATIENT)
Dept: SURGERY | Facility: CLINIC | Age: 46
DRG: 501 | End: 2021-09-19
Payer: COMMERCIAL

## 2021-09-19 ENCOUNTER — ANESTHESIA (OUTPATIENT)
Dept: SURGERY | Facility: CLINIC | Age: 46
DRG: 501 | End: 2021-09-19
Payer: COMMERCIAL

## 2021-09-19 PROBLEM — M71.121 SEPTIC BURSITIS OF ELBOW, RIGHT: Status: ACTIVE | Noted: 2021-09-19

## 2021-09-19 LAB
BASOPHILS # BLD AUTO: 0.1 10E3/UL (ref 0–0.2)
BASOPHILS NFR BLD AUTO: 1 %
EOSINOPHIL # BLD AUTO: 0.2 10E3/UL (ref 0–0.7)
EOSINOPHIL NFR BLD AUTO: 2 %
ERYTHROCYTE [DISTWIDTH] IN BLOOD BY AUTOMATED COUNT: 13 % (ref 10–15)
GRAM STAIN RESULT: NORMAL
GRAM STAIN RESULT: NORMAL
HCT VFR BLD AUTO: 40 % (ref 40–53)
HGB BLD-MCNC: 12.8 G/DL (ref 13.3–17.7)
IMM GRANULOCYTES # BLD: 0.2 10E3/UL
IMM GRANULOCYTES NFR BLD: 1 %
LYMPHOCYTES # BLD AUTO: 1.5 10E3/UL (ref 0.8–5.3)
LYMPHOCYTES NFR BLD AUTO: 12 %
MCH RBC QN AUTO: 26.9 PG (ref 26.5–33)
MCHC RBC AUTO-ENTMCNC: 32 G/DL (ref 31.5–36.5)
MCV RBC AUTO: 84 FL (ref 78–100)
MONOCYTES # BLD AUTO: 1 10E3/UL (ref 0–1.3)
MONOCYTES NFR BLD AUTO: 8 %
NEUTROPHILS # BLD AUTO: 9.5 10E3/UL (ref 1.6–8.3)
NEUTROPHILS NFR BLD AUTO: 76 %
NRBC # BLD AUTO: 0 10E3/UL
NRBC BLD AUTO-RTO: 0 /100
PLATELET # BLD AUTO: 384 10E3/UL (ref 150–450)
POTASSIUM BLD-SCNC: 3.9 MMOL/L (ref 3.4–5.3)
RBC # BLD AUTO: 4.76 10E6/UL (ref 4.4–5.9)
WBC # BLD AUTO: 12.4 10E3/UL (ref 4–11)

## 2021-09-19 PROCEDURE — 85025 COMPLETE CBC W/AUTO DIFF WBC: CPT | Performed by: INTERNAL MEDICINE

## 2021-09-19 PROCEDURE — 87077 CULTURE AEROBIC IDENTIFY: CPT | Performed by: ORTHOPAEDIC SURGERY

## 2021-09-19 PROCEDURE — 250N000013 HC RX MED GY IP 250 OP 250 PS 637: Performed by: PHYSICIAN ASSISTANT

## 2021-09-19 PROCEDURE — 87205 SMEAR GRAM STAIN: CPT | Performed by: ORTHOPAEDIC SURGERY

## 2021-09-19 PROCEDURE — 250N000009 HC RX 250: Performed by: NURSE ANESTHETIST, CERTIFIED REGISTERED

## 2021-09-19 PROCEDURE — 360N000075 HC SURGERY LEVEL 2, PER MIN: Performed by: ORTHOPAEDIC SURGERY

## 2021-09-19 PROCEDURE — 250N000013 HC RX MED GY IP 250 OP 250 PS 637: Performed by: NURSE ANESTHETIST, CERTIFIED REGISTERED

## 2021-09-19 PROCEDURE — 370N000017 HC ANESTHESIA TECHNICAL FEE, PER MIN: Performed by: ORTHOPAEDIC SURGERY

## 2021-09-19 PROCEDURE — 272N000001 HC OR GENERAL SUPPLY STERILE: Performed by: ORTHOPAEDIC SURGERY

## 2021-09-19 PROCEDURE — 250N000013 HC RX MED GY IP 250 OP 250 PS 637: Performed by: INTERNAL MEDICINE

## 2021-09-19 PROCEDURE — 258N000003 HC RX IP 258 OP 636: Performed by: ORTHOPAEDIC SURGERY

## 2021-09-19 PROCEDURE — 87075 CULTR BACTERIA EXCEPT BLOOD: CPT | Performed by: ORTHOPAEDIC SURGERY

## 2021-09-19 PROCEDURE — 250N000013 HC RX MED GY IP 250 OP 250 PS 637: Performed by: ORTHOPAEDIC SURGERY

## 2021-09-19 PROCEDURE — 84132 ASSAY OF SERUM POTASSIUM: CPT | Performed by: INTERNAL MEDICINE

## 2021-09-19 PROCEDURE — 120N000001 HC R&B MED SURG/OB

## 2021-09-19 PROCEDURE — 250N000011 HC RX IP 250 OP 636: Performed by: NURSE ANESTHETIST, CERTIFIED REGISTERED

## 2021-09-19 PROCEDURE — 36415 COLL VENOUS BLD VENIPUNCTURE: CPT | Performed by: INTERNAL MEDICINE

## 2021-09-19 PROCEDURE — 710N000010 HC RECOVERY PHASE 1, LEVEL 2, PER MIN: Performed by: ORTHOPAEDIC SURGERY

## 2021-09-19 PROCEDURE — 250N000011 HC RX IP 250 OP 636: Performed by: INTERNAL MEDICINE

## 2021-09-19 PROCEDURE — 0M930ZZ DRAINAGE OF RIGHT ELBOW BURSA AND LIGAMENT, OPEN APPROACH: ICD-10-PCS | Performed by: ORTHOPAEDIC SURGERY

## 2021-09-19 PROCEDURE — 250N000013 HC RX MED GY IP 250 OP 250 PS 637: Performed by: FAMILY MEDICINE

## 2021-09-19 PROCEDURE — 250N000011 HC RX IP 250 OP 636

## 2021-09-19 PROCEDURE — 258N000003 HC RX IP 258 OP 636: Performed by: NURSE ANESTHETIST, CERTIFIED REGISTERED

## 2021-09-19 PROCEDURE — 99233 SBSQ HOSP IP/OBS HIGH 50: CPT | Performed by: INTERNAL MEDICINE

## 2021-09-19 PROCEDURE — 258N000001 HC RX 258: Performed by: ORTHOPAEDIC SURGERY

## 2021-09-19 RX ORDER — ACETAMINOPHEN 325 MG/1
650 TABLET ORAL EVERY 4 HOURS PRN
Status: DISCONTINUED | OUTPATIENT
Start: 2021-09-22 | End: 2021-09-19 | Stop reason: DRUGHIGH

## 2021-09-19 RX ORDER — PROCHLORPERAZINE MALEATE 5 MG
10 TABLET ORAL EVERY 6 HOURS PRN
Status: DISCONTINUED | OUTPATIENT
Start: 2021-09-19 | End: 2021-09-20 | Stop reason: HOSPADM

## 2021-09-19 RX ORDER — KETOROLAC TROMETHAMINE 30 MG/ML
INJECTION, SOLUTION INTRAMUSCULAR; INTRAVENOUS PRN
Status: DISCONTINUED | OUTPATIENT
Start: 2021-09-19 | End: 2021-09-19

## 2021-09-19 RX ORDER — LIDOCAINE HYDROCHLORIDE 10 MG/ML
INJECTION, SOLUTION INFILTRATION; PERINEURAL PRN
Status: DISCONTINUED | OUTPATIENT
Start: 2021-09-19 | End: 2021-09-19

## 2021-09-19 RX ORDER — HYDROMORPHONE HCL IN WATER/PF 6 MG/30 ML
0.2 PATIENT CONTROLLED ANALGESIA SYRINGE INTRAVENOUS
Status: DISCONTINUED | OUTPATIENT
Start: 2021-09-19 | End: 2021-09-20 | Stop reason: HOSPADM

## 2021-09-19 RX ORDER — FENTANYL CITRATE 50 UG/ML
INJECTION, SOLUTION INTRAMUSCULAR; INTRAVENOUS PRN
Status: DISCONTINUED | OUTPATIENT
Start: 2021-09-19 | End: 2021-09-19

## 2021-09-19 RX ORDER — CEFAZOLIN SODIUM 1 G/3ML
INJECTION, POWDER, FOR SOLUTION INTRAMUSCULAR; INTRAVENOUS PRN
Status: DISCONTINUED | OUTPATIENT
Start: 2021-09-19 | End: 2021-09-19

## 2021-09-19 RX ORDER — GLYCOPYRROLATE 0.2 MG/ML
INJECTION, SOLUTION INTRAMUSCULAR; INTRAVENOUS PRN
Status: DISCONTINUED | OUTPATIENT
Start: 2021-09-19 | End: 2021-09-19

## 2021-09-19 RX ORDER — ACETAMINOPHEN 325 MG/1
975 TABLET ORAL ONCE
Status: DISCONTINUED | OUTPATIENT
Start: 2021-09-19 | End: 2021-09-19 | Stop reason: HOSPADM

## 2021-09-19 RX ORDER — KETAMINE HYDROCHLORIDE 10 MG/ML
INJECTION INTRAMUSCULAR; INTRAVENOUS PRN
Status: DISCONTINUED | OUTPATIENT
Start: 2021-09-19 | End: 2021-09-19

## 2021-09-19 RX ORDER — ONDANSETRON 2 MG/ML
4 INJECTION INTRAMUSCULAR; INTRAVENOUS EVERY 30 MIN PRN
Status: DISCONTINUED | OUTPATIENT
Start: 2021-09-19 | End: 2021-09-19 | Stop reason: HOSPADM

## 2021-09-19 RX ORDER — HYDROMORPHONE HCL IN WATER/PF 6 MG/30 ML
0.4 PATIENT CONTROLLED ANALGESIA SYRINGE INTRAVENOUS EVERY 5 MIN PRN
Status: DISCONTINUED | OUTPATIENT
Start: 2021-09-19 | End: 2021-09-19 | Stop reason: HOSPADM

## 2021-09-19 RX ORDER — DIMENHYDRINATE 50 MG/ML
25 INJECTION, SOLUTION INTRAMUSCULAR; INTRAVENOUS
Status: DISCONTINUED | OUTPATIENT
Start: 2021-09-19 | End: 2021-09-19 | Stop reason: HOSPADM

## 2021-09-19 RX ORDER — FENTANYL CITRATE 50 UG/ML
50 INJECTION, SOLUTION INTRAMUSCULAR; INTRAVENOUS EVERY 5 MIN PRN
Status: DISCONTINUED | OUTPATIENT
Start: 2021-09-19 | End: 2021-09-19 | Stop reason: HOSPADM

## 2021-09-19 RX ORDER — ONDANSETRON 4 MG/1
4 TABLET, ORALLY DISINTEGRATING ORAL EVERY 6 HOURS PRN
Status: DISCONTINUED | OUTPATIENT
Start: 2021-09-19 | End: 2021-09-20 | Stop reason: HOSPADM

## 2021-09-19 RX ORDER — AMOXICILLIN 250 MG
1 CAPSULE ORAL 2 TIMES DAILY
Status: DISCONTINUED | OUTPATIENT
Start: 2021-09-19 | End: 2021-09-20 | Stop reason: HOSPADM

## 2021-09-19 RX ORDER — LIDOCAINE 40 MG/G
CREAM TOPICAL
Status: DISCONTINUED | OUTPATIENT
Start: 2021-09-19 | End: 2021-09-20 | Stop reason: HOSPADM

## 2021-09-19 RX ORDER — DEXAMETHASONE SODIUM PHOSPHATE 4 MG/ML
INJECTION, SOLUTION INTRA-ARTICULAR; INTRALESIONAL; INTRAMUSCULAR; INTRAVENOUS; SOFT TISSUE PRN
Status: DISCONTINUED | OUTPATIENT
Start: 2021-09-19 | End: 2021-09-19

## 2021-09-19 RX ORDER — FENTANYL CITRATE 50 UG/ML
INJECTION, SOLUTION INTRAMUSCULAR; INTRAVENOUS
Status: COMPLETED
Start: 2021-09-19 | End: 2021-09-19

## 2021-09-19 RX ORDER — CEFAZOLIN SODIUM 2 G/100ML
2 INJECTION, SOLUTION INTRAVENOUS EVERY 8 HOURS
Status: DISCONTINUED | OUTPATIENT
Start: 2021-09-19 | End: 2021-09-19

## 2021-09-19 RX ORDER — PROPOFOL 10 MG/ML
INJECTION, EMULSION INTRAVENOUS CONTINUOUS PRN
Status: DISCONTINUED | OUTPATIENT
Start: 2021-09-19 | End: 2021-09-19

## 2021-09-19 RX ORDER — ONDANSETRON 2 MG/ML
4 INJECTION INTRAMUSCULAR; INTRAVENOUS EVERY 6 HOURS PRN
Status: DISCONTINUED | OUTPATIENT
Start: 2021-09-19 | End: 2021-09-20 | Stop reason: HOSPADM

## 2021-09-19 RX ORDER — PROPOFOL 10 MG/ML
INJECTION, EMULSION INTRAVENOUS PRN
Status: DISCONTINUED | OUTPATIENT
Start: 2021-09-19 | End: 2021-09-19

## 2021-09-19 RX ORDER — MEPERIDINE HYDROCHLORIDE 25 MG/ML
12.5 INJECTION INTRAMUSCULAR; INTRAVENOUS; SUBCUTANEOUS EVERY 5 MIN PRN
Status: DISCONTINUED | OUTPATIENT
Start: 2021-09-19 | End: 2021-09-19 | Stop reason: HOSPADM

## 2021-09-19 RX ORDER — HYDROXYZINE HYDROCHLORIDE 50 MG/1
50 TABLET, FILM COATED ORAL EVERY 6 HOURS PRN
Status: DISCONTINUED | OUTPATIENT
Start: 2021-09-19 | End: 2021-09-19 | Stop reason: HOSPADM

## 2021-09-19 RX ORDER — TRAMADOL HYDROCHLORIDE 50 MG/1
50 TABLET ORAL EVERY 6 HOURS PRN
Status: DISCONTINUED | OUTPATIENT
Start: 2021-09-19 | End: 2021-09-20 | Stop reason: HOSPADM

## 2021-09-19 RX ORDER — HYDROMORPHONE HCL IN WATER/PF 6 MG/30 ML
0.4 PATIENT CONTROLLED ANALGESIA SYRINGE INTRAVENOUS
Status: DISCONTINUED | OUTPATIENT
Start: 2021-09-19 | End: 2021-09-20 | Stop reason: HOSPADM

## 2021-09-19 RX ORDER — SODIUM CHLORIDE, SODIUM LACTATE, POTASSIUM CHLORIDE, CALCIUM CHLORIDE 600; 310; 30; 20 MG/100ML; MG/100ML; MG/100ML; MG/100ML
INJECTION, SOLUTION INTRAVENOUS CONTINUOUS
Status: DISCONTINUED | OUTPATIENT
Start: 2021-09-19 | End: 2021-09-19 | Stop reason: HOSPADM

## 2021-09-19 RX ORDER — ONDANSETRON 4 MG/1
4 TABLET, ORALLY DISINTEGRATING ORAL EVERY 30 MIN PRN
Status: DISCONTINUED | OUTPATIENT
Start: 2021-09-19 | End: 2021-09-19 | Stop reason: HOSPADM

## 2021-09-19 RX ORDER — BISACODYL 10 MG
10 SUPPOSITORY, RECTAL RECTAL DAILY PRN
Status: DISCONTINUED | OUTPATIENT
Start: 2021-09-19 | End: 2021-09-20 | Stop reason: HOSPADM

## 2021-09-19 RX ORDER — ACETAMINOPHEN 325 MG/1
975 TABLET ORAL EVERY 8 HOURS
Status: DISCONTINUED | OUTPATIENT
Start: 2021-09-19 | End: 2021-09-20 | Stop reason: HOSPADM

## 2021-09-19 RX ORDER — SODIUM CHLORIDE, SODIUM LACTATE, POTASSIUM CHLORIDE, CALCIUM CHLORIDE 600; 310; 30; 20 MG/100ML; MG/100ML; MG/100ML; MG/100ML
INJECTION, SOLUTION INTRAVENOUS CONTINUOUS PRN
Status: DISCONTINUED | OUTPATIENT
Start: 2021-09-19 | End: 2021-09-19

## 2021-09-19 RX ORDER — SODIUM CHLORIDE, SODIUM LACTATE, POTASSIUM CHLORIDE, CALCIUM CHLORIDE 600; 310; 30; 20 MG/100ML; MG/100ML; MG/100ML; MG/100ML
INJECTION, SOLUTION INTRAVENOUS CONTINUOUS
Status: DISCONTINUED | OUTPATIENT
Start: 2021-09-19 | End: 2021-09-20 | Stop reason: HOSPADM

## 2021-09-19 RX ORDER — POLYETHYLENE GLYCOL 3350 17 G/17G
17 POWDER, FOR SOLUTION ORAL DAILY
Status: DISCONTINUED | OUTPATIENT
Start: 2021-09-20 | End: 2021-09-20 | Stop reason: HOSPADM

## 2021-09-19 RX ORDER — ONDANSETRON 2 MG/ML
INJECTION INTRAMUSCULAR; INTRAVENOUS PRN
Status: DISCONTINUED | OUTPATIENT
Start: 2021-09-19 | End: 2021-09-19

## 2021-09-19 RX ADMIN — FENTANYL CITRATE 100 MCG: 50 INJECTION, SOLUTION INTRAMUSCULAR; INTRAVENOUS at 13:00

## 2021-09-19 RX ADMIN — PROPOFOL 200 MG: 10 INJECTION, EMULSION INTRAVENOUS at 13:00

## 2021-09-19 RX ADMIN — CEFAZOLIN SODIUM 2 G: 2 INJECTION, SOLUTION INTRAVENOUS at 21:18

## 2021-09-19 RX ADMIN — FAMOTIDINE 20 MG: 20 TABLET ORAL at 08:02

## 2021-09-19 RX ADMIN — FENTANYL CITRATE 100 MCG: 50 INJECTION, SOLUTION INTRAMUSCULAR; INTRAVENOUS at 13:10

## 2021-09-19 RX ADMIN — FENTANYL CITRATE 50 MCG: 50 INJECTION, SOLUTION INTRAMUSCULAR; INTRAVENOUS at 13:16

## 2021-09-19 RX ADMIN — HYDROXYZINE HYDROCHLORIDE 50 MG: 50 TABLET, FILM COATED ORAL at 14:28

## 2021-09-19 RX ADMIN — PANTOPRAZOLE SODIUM 40 MG: 40 TABLET, DELAYED RELEASE ORAL at 06:13

## 2021-09-19 RX ADMIN — DEXAMETHASONE SODIUM PHOSPHATE 8 MG: 4 INJECTION, SOLUTION INTRA-ARTICULAR; INTRALESIONAL; INTRAMUSCULAR; INTRAVENOUS; SOFT TISSUE at 13:00

## 2021-09-19 RX ADMIN — SODIUM CHLORIDE, POTASSIUM CHLORIDE, SODIUM LACTATE AND CALCIUM CHLORIDE: 600; 310; 30; 20 INJECTION, SOLUTION INTRAVENOUS at 12:49

## 2021-09-19 RX ADMIN — KETAMINE HYDROCHLORIDE 20 MG: 10 INJECTION, SOLUTION INTRAMUSCULAR; INTRAVENOUS at 13:13

## 2021-09-19 RX ADMIN — FENTANYL CITRATE 50 MCG: 50 INJECTION, SOLUTION INTRAMUSCULAR; INTRAVENOUS at 14:10

## 2021-09-19 RX ADMIN — OXYCODONE HYDROCHLORIDE 5 MG: 5 TABLET ORAL at 02:25

## 2021-09-19 RX ADMIN — GLYCOPYRROLATE 0.2 MG: 0.2 INJECTION, SOLUTION INTRAMUSCULAR; INTRAVENOUS at 13:00

## 2021-09-19 RX ADMIN — ACETAMINOPHEN 975 MG: 325 TABLET, FILM COATED ORAL at 17:49

## 2021-09-19 RX ADMIN — FENTANYL CITRATE 50 MCG: 50 INJECTION, SOLUTION INTRAMUSCULAR; INTRAVENOUS at 14:29

## 2021-09-19 RX ADMIN — OXYCODONE HYDROCHLORIDE 5 MG: 5 TABLET ORAL at 08:04

## 2021-09-19 RX ADMIN — ONDANSETRON 4 MG: 2 INJECTION INTRAMUSCULAR; INTRAVENOUS at 13:00

## 2021-09-19 RX ADMIN — ACETAMINOPHEN 650 MG: 325 TABLET, FILM COATED ORAL at 06:13

## 2021-09-19 RX ADMIN — CEFAZOLIN 2 G: 1 INJECTION, POWDER, FOR SOLUTION INTRAMUSCULAR; INTRAVENOUS at 13:10

## 2021-09-19 RX ADMIN — MIDAZOLAM 2 MG: 1 INJECTION INTRAMUSCULAR; INTRAVENOUS at 12:49

## 2021-09-19 RX ADMIN — CEFAZOLIN SODIUM 2 G: 2 INJECTION, SOLUTION INTRAVENOUS at 06:13

## 2021-09-19 RX ADMIN — KETOROLAC TROMETHAMINE 30 MG: 30 INJECTION, SOLUTION INTRAMUSCULAR at 13:23

## 2021-09-19 RX ADMIN — LIDOCAINE HYDROCHLORIDE 50 MG: 10 INJECTION, SOLUTION INFILTRATION; PERINEURAL at 13:00

## 2021-09-19 RX ADMIN — KETAMINE HYDROCHLORIDE 10 MG: 10 INJECTION, SOLUTION INTRAMUSCULAR; INTRAVENOUS at 13:31

## 2021-09-19 RX ADMIN — CALCIUM CARBONATE (ANTACID) CHEW TAB 500 MG 500 MG: 500 CHEW TAB at 21:18

## 2021-09-19 RX ADMIN — SODIUM CHLORIDE, POTASSIUM CHLORIDE, SODIUM LACTATE AND CALCIUM CHLORIDE: 600; 310; 30; 20 INJECTION, SOLUTION INTRAVENOUS at 15:16

## 2021-09-19 RX ADMIN — FAMOTIDINE 20 MG: 20 TABLET ORAL at 21:18

## 2021-09-19 RX ADMIN — HYDROMORPHONE HYDROCHLORIDE 0.4 MG: 0.2 INJECTION, SOLUTION INTRAMUSCULAR; INTRAVENOUS; SUBCUTANEOUS at 14:51

## 2021-09-19 RX ADMIN — PROPOFOL 150 MCG/KG/MIN: 10 INJECTION, EMULSION INTRAVENOUS at 13:02

## 2021-09-19 ASSESSMENT — ACTIVITIES OF DAILY LIVING (ADL)
ADLS_ACUITY_SCORE: 3

## 2021-09-19 NOTE — ANESTHESIA POSTPROCEDURE EVALUATION
Patient: Daniele Carter    Procedure(s):  RIGHT ELBOW IRRIGATION & DEBRIDEMENT OLECRENON BURSITIS    Diagnosis:Septic olecranon bursitis of right elbow [M71.121]  Cellulitis of right elbow [L03.113]  Diagnosis Additional Information: No value filed.    Anesthesia Type:  General    Note:  Disposition: Inpatient   Postop Pain Control: Uneventful            Sign Out: Well controlled pain   PONV: No   Neuro/Psych: Uneventful            Sign Out: Acceptable/Baseline neuro status   Airway/Respiratory: Uneventful            Sign Out: Acceptable/Baseline resp. status   CV/Hemodynamics: Uneventful            Sign Out: Acceptable CV status; No obvious hypovolemia; No obvious fluid overload   Other NRE: NONE   DID A NON-ROUTINE EVENT OCCUR? No           Last vitals:  Vitals Value Taken Time   /99 09/19/21 1430   Temp 36.9  C (98.4  F) 09/19/21 1430   Pulse 91 09/19/21 1430   Resp 14 09/19/21 1430   SpO2 95 % 09/19/21 1458       Electronically Signed By: ISSA Mccarty CRNA  September 19, 2021  3:44 PM

## 2021-09-19 NOTE — PROGRESS NOTES
[facility] Internal Medicine Progress Note     Date of Service (when I saw the patient): 09/19/2021    REASON FOR ADMISSION / INTERVAL HISTORY:  Daniele Carter is a 46 year old male admitted on 9/15/2021. He presented to the emergency department for evaluation of worsening right elbow swelling despite oral antibiotic use  Continues to have severe pain/ tenderness in the elbow. Unchanged  since yesterday. See details below.    MRI R elbow- Impression:     1. Encapsulated fluid collection at the posterior lateral aspect of  the elbow is consistent with olecranon bursitis the sterility of which  can be confirmed with aspiration.     2. Extensive subcutaneous edema and enhancement extending along the  forearm consistent with cellulitis. Findings include prominent,  partially loculated fluid along the lateral aspect of the midforearm.  Additional abscess difficult to exclude.    ASSESSMENT/PLAN:     Septic olecranon bursitis of right elbow  Right elbow cellulitis  Acute onset of right elbow pain on 9/12, progressively worsening. Evaluated in urgent care on 9/13 and prescribed Keflex 500 mg qid x 10 days. Evaluated by Sports Med on 9/14, s/p right olecranon bursa arthrocentesis (6 mL yellow, cloudy and blood tinged aspirate). Erythema worsening despite Keflex, so advised to go to the emergency department on 9/15. Presented with low grade fever and leukocytosis. Aspirate from 9/14 growing gram positive cocci, culture shows MSSA  Erythema and swelling worsening . CRP was better  and WBC still elevated. MRI as above.   -continue vanco for now. To OR today.      SIRs criteria  Leukocytosis with left shift  2ndry to above. Admit WBC 16.1, ANC 13.4. Tmax 100.9. HR 90s-100s. Not clearly septic though noted infection as above due to bursitis and cellulitis.  Continue rx and plan as above.     Chest discomfort  History of acid reflux  Resolved. Likely due top GERD. Continue home PPI.     Hypokalemia   Admit K = 3.2.  "Improved with repletion.  - K replacement     Mild persistent asthma  Stable respiratory status on admission, no evidence of exacerbation. Managed prior to admission with Arnuity daily (when he remembers) and prn albuterol.  - Hold Arnuity in the hospital  - Prn albuterol neb available      COVID status  COVID PCR negative 9/15/2021  Patient had known COVID infection with positive PCR on 10/29/20  Patient has not been vaccinated, is not interested in vaccine at this time due to history of side effects with many medications, vitamins, and vaccines.        Disposition Plan   Expected discharge: 2-3 days recommended to prior living arrangement once surgery done and infection is improving-.    INES VALDEZ MD   Pg [phone]    DVT Prhylaxis: Low Risk/Ambulatory with no VTE prophylaxis indicated  Code Status: Full Code    ROS:  As described in A/P and Exam.  Otherwise ALL are  negative.    PHYSICAL EXAM:  All vitals have been reviewed    Blood pressure 101/74, pulse 91, temperature 98  F (36.7  C), temperature source Oral, resp. rate 19, height 1.702 m (5' 7\"), weight 85.2 kg (187 lb 13.3 oz), SpO2 96 %.    No intake/output data recorded.    GENERAL APPEARANCE: healthy, alert and no distress  EYES: conjunctiva clear, eyes grossly normal  HENT: external ears and nose normal   RESP: lungs clear to auscultation - no rales, rhonchi or wheezes  CV: regular rate and rhythm, normal S1 S2, no S3 or S4 and no murmur, click or rub   ABDOMEN: soft, nontender, no HSM or masses and bowel sounds normal  MS: no clubbing, cyanosis; R elbow edema/ erythema from lower arm to upper forearm in circumferential distribution.   SKIN: clear without significant rashes or lesions  NEURO: -non-focal moves all 4 extr    ROUTINE  LABS (Last four results)  CMP  Recent Labs   Lab 09/19/21  0436 09/18/21  0442 09/17/21  0439 09/16/21  0424 09/15/21  1805 09/15/21  0951   NA  --   --  138 137  --  137   POTASSIUM 3.9 3.8 3.7 3.6   < > 3.2*   CHLORIDE  " --   --  106 104  --  104   CO2  --   --  26 25  --  28   ANIONGAP  --   --  6 8  --  5   GLC  --   --  99 97  --  130*   BUN  --   --  13 11  --  10   CR  --   --  0.72 0.81  --  0.78   GFRESTIMATED  --   --  >90 >90  --  >90   PAULIE  --   --  8.3* 8.3*  --  8.4*   PROTTOTAL  --   --   --  7.1  --   --    ALBUMIN  --   --   --  2.5*  --   --    BILITOTAL  --   --   --  1.0  --   --    ALKPHOS  --   --   --  66  --   --    AST  --   --   --  14  --   --    ALT  --   --   --  25  --   --     < > = values in this interval not displayed.     CBC  Recent Labs   Lab 09/19/21  0436 09/18/21  0442 09/17/21  0439 09/16/21  0424   WBC 12.4* 13.0* 12.9* 12.8*   RBC 4.76 4.92 4.83 4.83   HGB 12.8* 13.5 13.3 13.3   HCT 40.0 42.1 41.4 41.0   MCV 84 86 86 85   MCH 26.9 27.4 27.5 27.5   MCHC 32.0 32.1 32.1 32.4   RDW 13.0 13.2 13.0 13.1    327 331 303     INRNo lab results found in last 7 days.  Arterial Blood GasNo lab results found in last 7 days.    No results found for this or any previous visit (from the past 24 hour(s)).

## 2021-09-19 NOTE — OP NOTE
Operative note 9/19/2021    Pre op dx: right elbow acute septic bursitis  Post op dx same  Procedure: open I/D right elbow septic bursa    Surgeon: CHUY Chi MD  Assist: none    Anesth; GENERAL    Ebl: 10cc    Findings: pus, cultures sent    This patient was brought to the OR, given a general anesthetic.  A time out was taken.  A proximal arm tournequet was elevated, 250mmhg.  The fluctuant bursa on the tip of the olecranon was incised with a #15 blade.  Pus poured out, approx 40cc.  This was cultured.  I then irrigated with the pulsitile lavage and then used a rongeur to debride any necrotic bursa.  The tournequet was reduced, and the wound was closed in layers, 0, 2-0 and monocryl stratifix.   A compressive ace wrap dressing was applied and he was extubated in stable condition.    Chidi Chi MD

## 2021-09-19 NOTE — ANESTHESIA PROCEDURE NOTES
Airway       Patient location during procedure: OR  Staff -        CRNA: Bela Damian APRN CRNA       Performed By: CRNA  Consent for Airway        Urgency: elective  Indications and Patient Condition       Indications for airway management: scooter-procedural       Induction type:intravenous       Mask difficulty assessment: 1 - vent by mask    Final Airway Details       Final airway type: supraglottic airway    Supraglottic Airway Details        Type: LMA       Brand: Air-Q       LMA size: 4.5    Post intubation assessment        Placement verified by: capnometry, equal breath sounds and chest rise        Number of attempts at approach: 1       Number of other approaches attempted: 0       Ease of procedure: easy       Dentition: Intact and Unchanged

## 2021-09-19 NOTE — BRIEF OP NOTE
[facility]  Brief Operative Note      Pre-operative diagnosis: Septic olecranon bursitis of right elbow [M71.121]  Cellulitis of right elbow [L03.113]   Post-operative diagnosis: Same   Procedure: Open I/D right elbow septic bursa   Surgeon: Chidi Houston MD     Assistant(s): none   Anesthesia: General endotracheal anesthesia   Estimated blood loss: Less than 10 ml               Drains: None   Specimens: Cultures, bursa       Findings: See full dictated operative note for details   Complications: None                   Comments: See dictated operative report for full details       Follow up plan                         Ancef q 8 hr IV x 2, then oral abx per hospitalist recommendaiton for 7-10 days at discharge.  Expect discharge to home Monday 9/20.  Leave ace wrap dressing on until post op visit with Dr houston care team, Maricarmen Quiñones pa-c.  No work until return to clinic visit.

## 2021-09-19 NOTE — PLAN OF CARE
Patient requested Tylenol for pain with stated adequate results.  Right arm ACE wrapped following shower.  Right hand elevated.  Patient resting in recliner.

## 2021-09-19 NOTE — ANESTHESIA CARE TRANSFER NOTE
Patient: Daniele Carter    Procedure(s):  RIGHT ELBOW IRRIGATION & DEBRIDEMENT OLECRENON BURSITIS    Diagnosis: Septic olecranon bursitis of right elbow [M71.121]  Cellulitis of right elbow [L03.113]  Diagnosis Additional Information: No value filed.    Anesthesia Type:   General     Note:    Oropharynx: oropharynx clear of all foreign objects and spontaneously breathing  Level of Consciousness: awake  Oxygen Supplementation: nasal cannula  Level of Supplemental Oxygen (L/min / FiO2): 3  Independent Airway: airway patency satisfactory and stable  Dentition: dentition unchanged  Vital Signs Stable: post-procedure vital signs reviewed and stable  Report to RN Given: handoff report given  Patient transferred to: PACU    Handoff Report: Identifed the Patient, Identified the Reponsible Provider, Reviewed the pertinent medical history, Discussed the surgical course, Reviewed Intra-OP anesthesia mangement and issues during anesthesia, Set expectations for post-procedure period and Allowed opportunity for questions and acknowledgement of understanding      Vitals:  Vitals Value Taken Time   /82 09/19/21 1356   Temp     Pulse 92 09/19/21 1356   Resp 16 09/19/21 1356   SpO2 96 % 09/19/21 1356       Electronically Signed By: ISSA Mccarty CRNA  September 19, 2021  2:03 PM

## 2021-09-19 NOTE — ANESTHESIA PREPROCEDURE EVALUATION
Anesthesia Pre-Procedure Evaluation    Patient: Daniele Carter   MRN: [id number] : 1975        Preoperative Diagnosis: Septic olecranon bursitis of right elbow [M71.121]  Cellulitis of right elbow [L03.113]   Procedure : Procedure(s):  INCISION AND DRAINAGE, UPPER EXTREMITY     Past Medical History:   Diagnosis Date     Esophageal reflux      Hyperlipidemia      Pain in joint, lower leg       Past Surgical History:   Procedure Laterality Date     COLONOSCOPY  2011    Procedure:COLONOSCOPY; Surgeon:JONAS JIAN; Location:[facility]     ESOPHAGOSCOPY, GASTROSCOPY, DUODENOSCOPY (EGD), COMBINED N/A 3/28/2017    Procedure: COMBINED ESOPHAGOSCOPY, GASTROSCOPY, DUODENOSCOPY (EGD), BIOPSY SINGLE OR MULTIPLE;  Surgeon: Nikolai Valladares MD;  Location: [facility]      Allergies   Allergen Reactions     Bee Anaphylaxis     Asa [Aspirin]      Unknown reaction- Hives???     Chocolate Flavor Hives      Social History     Tobacco Use     Smoking status: Never Smoker     Smokeless tobacco: Never Used   Substance Use Topics     Alcohol use: Yes     Comment: weekends      Wt Readings from Last 1 Encounters:   21 85.2 kg (187 lb 13.3 oz)        Anesthesia Evaluation   Pt has had prior anesthetic. Type: MAC.    No history of anesthetic complications       ROS/MED HX  ENT/Pulmonary:     (+) asthma     Neurologic:  - neg neurologic ROS     Cardiovascular:     (+) Dyslipidemia -----    METS/Exercise Tolerance:     Hematologic:  - neg hematologic  ROS     Musculoskeletal: Comment: Septic olecranon bursitis of right elbow      GI/Hepatic:     (+) GERD,     Renal/Genitourinary:  - neg Renal ROS     Endo:  - neg endo ROS     Psychiatric/Substance Use:  - neg psychiatric ROS     Infectious Disease:  - neg infectious disease ROS     Malignancy:  - neg malignancy ROS     Other:            Physical Exam    Airway             Respiratory Devices and Support         Dental  no notable dental history         Cardiovascular    cardiovascular exam normal          Pulmonary   pulmonary exam normal                OUTSIDE LABS:  CBC:   Lab Results   Component Value Date    WBC 12.4 (H) 09/19/2021    WBC 13.0 (H) 09/18/2021    HGB 12.8 (L) 09/19/2021    HGB 13.5 09/18/2021    HCT 40.0 09/19/2021    HCT 42.1 09/18/2021     09/19/2021     09/18/2021     BMP:   Lab Results   Component Value Date     09/17/2021     09/16/2021    POTASSIUM 3.9 09/19/2021    POTASSIUM 3.8 09/18/2021    CHLORIDE 106 09/17/2021    CHLORIDE 104 09/16/2021    CO2 26 09/17/2021    CO2 25 09/16/2021    BUN 13 09/17/2021    BUN 11 09/16/2021    CR 0.72 09/17/2021    CR 0.81 09/16/2021    GLC 99 09/17/2021    GLC 97 09/16/2021     COAGS: No results found for: PTT, INR, FIBR  POC: No results found for: BGM, HCG, HCGS  HEPATIC:   Lab Results   Component Value Date    ALBUMIN 2.5 (L) 09/16/2021    PROTTOTAL 7.1 09/16/2021    ALT 25 09/16/2021    AST 14 09/16/2021    ALKPHOS 66 09/16/2021    BILITOTAL 1.0 09/16/2021     OTHER:   Lab Results   Component Value Date    LACT 0.9 09/18/2021    PAULIE 8.3 (L) 09/17/2021    LIPASE 184 03/25/2020    TSH 0.44 12/20/2017    T4 1.16 02/13/2012    CRP 98.2 (H) 09/18/2021    SED 7 05/09/2012       Anesthesia Plan    ASA Status:  2   NPO Status:  NPO Appropriate    Anesthesia Type: General.     - Airway: LMA   Induction: Intravenous, Propofol.   Maintenance: TIVA.        Consents    Anesthesia Plan(s) and associated risks, benefits, and realistic alternatives discussed. Questions answered and patient/representative(s) expressed understanding.     - Discussed with:  Patient      - Extended Intubation/Ventilatory Support Discussed: No.      - Patient is DNR/DNI Status: No    Use of blood products discussed: No .     Postoperative Care    Pain management: IV analgesics, Oral pain medications, Multi-modal analgesia.   PONV prophylaxis: Ondansetron (or other 5HT-3), Dexamethasone or Solumedrol     Comments:                 ISSA Mccarty CRNA

## 2021-09-19 NOTE — CONSULTS
Care management note:     During IDT rounds, MD indicated patient will most likely need IV antibiotics at discharge. I & D today, PICC line to be placed 9/20, with expected discharge 9/20-21.      Placed referral with [facility] ([phone]) for home infusion benefit check.  Spoke with TIFFANY Boone.  Will await return call with benefit information, likely 9/20.      PLAN:  I for home IV antibiotics?      Connie Clifton MSN, RN  Inpatient Care Coordinator  [facility] [phone]  [facility] [phone]

## 2021-09-19 NOTE — PROGRESS NOTES
[facility] TRANSPORT NOTE  Data:   Reason for Transport:  Med/surg    Daniele Carter was transported to room 2300 via cart at 1510.  Patient was accompanied by Registered Nurse. Equipment used for transport: Oxygen  Nasal Cannula. Family was aware of reason for transport: yes    Action:  Report: received from [facility]    Response:  Patient's condition upon return was stable.    Sharon Avelar RN

## 2021-09-19 NOTE — PLAN OF CARE
Minimal pain, tolerating regular diet.  Capno within normal limits.  Up with stand by assist.  Ace wrap/splint on rt arm and it is elevated on pillows with ice.

## 2021-09-19 NOTE — PLAN OF CARE
Oxycodone given for complaints of pain with stated adequate results.  Patient requested ACE bandages be removed due to discomfort that kept him awake.  Right arm elevated with pillows.  Patient expresses nervousness regarding upcoming procedure today.

## 2021-09-20 VITALS
BODY MASS INDEX: 30.52 KG/M2 | RESPIRATION RATE: 16 BRPM | HEIGHT: 67 IN | SYSTOLIC BLOOD PRESSURE: 144 MMHG | HEART RATE: 100 BPM | OXYGEN SATURATION: 96 % | WEIGHT: 194.45 LBS | TEMPERATURE: 97.6 F | DIASTOLIC BLOOD PRESSURE: 87 MMHG

## 2021-09-20 LAB
HGB BLD-MCNC: 13.3 G/DL (ref 13.3–17.7)
HOLD SPECIMEN: NORMAL

## 2021-09-20 PROCEDURE — 85018 HEMOGLOBIN: CPT | Performed by: ORTHOPAEDIC SURGERY

## 2021-09-20 PROCEDURE — 250N000013 HC RX MED GY IP 250 OP 250 PS 637: Performed by: INTERNAL MEDICINE

## 2021-09-20 PROCEDURE — 250N000013 HC RX MED GY IP 250 OP 250 PS 637: Performed by: ORTHOPAEDIC SURGERY

## 2021-09-20 PROCEDURE — 250N000011 HC RX IP 250 OP 636: Performed by: INTERNAL MEDICINE

## 2021-09-20 PROCEDURE — 250N000013 HC RX MED GY IP 250 OP 250 PS 637: Performed by: FAMILY MEDICINE

## 2021-09-20 PROCEDURE — 99239 HOSP IP/OBS DSCHRG MGMT >30: CPT | Performed by: INTERNAL MEDICINE

## 2021-09-20 PROCEDURE — 36415 COLL VENOUS BLD VENIPUNCTURE: CPT | Performed by: ORTHOPAEDIC SURGERY

## 2021-09-20 RX ORDER — CLINDAMYCIN HCL 150 MG
450 CAPSULE ORAL 3 TIMES DAILY
Qty: 90 CAPSULE | Refills: 0 | Status: SHIPPED | OUTPATIENT
Start: 2021-09-20 | End: 2021-09-20

## 2021-09-20 RX ORDER — CLINDAMYCIN HCL 150 MG
450 CAPSULE ORAL 3 TIMES DAILY
Qty: 90 CAPSULE | Refills: 0 | Status: SHIPPED | OUTPATIENT
Start: 2021-09-20 | End: 2021-11-17

## 2021-09-20 RX ORDER — OXYCODONE HYDROCHLORIDE 5 MG/1
2.5-5 TABLET ORAL EVERY 4 HOURS PRN
Qty: 10 TABLET | Refills: 0 | Status: SHIPPED | OUTPATIENT
Start: 2021-09-20 | End: 2021-11-17

## 2021-09-20 RX ADMIN — ACETAMINOPHEN 975 MG: 325 TABLET, FILM COATED ORAL at 10:40

## 2021-09-20 RX ADMIN — FAMOTIDINE 20 MG: 20 TABLET ORAL at 07:33

## 2021-09-20 RX ADMIN — PANTOPRAZOLE SODIUM 40 MG: 40 TABLET, DELAYED RELEASE ORAL at 05:57

## 2021-09-20 RX ADMIN — CEFAZOLIN SODIUM 2 G: 2 INJECTION, SOLUTION INTRAVENOUS at 05:57

## 2021-09-20 ASSESSMENT — ACTIVITIES OF DAILY LIVING (ADL)
ADLS_ACUITY_SCORE: 3

## 2021-09-20 ASSESSMENT — MIFFLIN-ST. JEOR: SCORE: 1730.63

## 2021-09-20 NOTE — PROGRESS NOTES
"[facility] Orthopaedics Progress Note      Post-operative Day: 1 Day Post-Op    Procedure(s):  RIGHT ELBOW IRRIGATION & DEBRIDEMENT OLECRENON BURSITIS  Subjective:    Pt reports improved pain in the right elbow; it does intermittently get more sore with activity. His fingers get a little more swollen with the Ace wrap in place but he can move them. He is happy to hear that he won't need IV medications and understands the plan for follow up with .     Chest pain, SOB:  No  Nausea, vomiting:  No  Lightheadedness, dizziness:  No  Neuro:  Patient denies new onset numbness or paresthesias      Objective:  Blood pressure (!) 144/87, pulse 100, temperature 97.6  F (36.4  C), temperature source Oral, resp. rate 16, height 1.702 m (5' 7\"), weight 88.2 kg (194 lb 7.1 oz), SpO2 96 %.    Patient Vitals for the past 24 hrs:   BP Temp Temp src Pulse Resp SpO2 Weight   09/20/21 0655 (!) 144/87 97.6  F (36.4  C) Oral 100 16 96 % --   09/20/21 0249 113/77 97.9  F (36.6  C) Oral 88 16 94 % 88.2 kg (194 lb 7.1 oz)   09/19/21 2331 113/73 97.3  F (36.3  C) Oral 81 16 92 % --   09/19/21 1836 117/68 98.7  F (37.1  C) Oral 112 17 91 % --   09/19/21 1800 132/88 -- -- 110 -- -- --   09/19/21 1730 118/76 -- -- 97 -- -- --   09/19/21 1700 126/78 -- -- 92 -- -- --   09/19/21 1630 121/80 -- -- 91 -- -- --   09/19/21 1600 122/80 -- -- 92 -- -- --   09/19/21 1530 125/80 -- -- 95 -- -- --   09/19/21 1519 134/88 98  F (36.7  C) -- 102 14 96 % --   09/19/21 1458 -- -- -- -- -- 95 % --   09/19/21 1430 (!) 130/99 98.4  F (36.9  C) Oral 91 14 94 % --   09/19/21 1415 (!) 143/96 -- -- 101 12 96 % --   09/19/21 1400 123/84 -- -- 93 12 98 % --   09/19/21 1356 112/82 -- -- 92 16 96 % --   09/19/21 1050 101/74 98  F (36.7  C) Oral 91 19 96 % --       Wt Readings from Last 4 Encounters:   09/20/21 88.2 kg (194 lb 7.1 oz)   09/14/21 83.9 kg (185 lb)   09/13/21 83.9 kg (185 lb)   08/31/21 86.7 kg (191 lb 2.2 oz)       Gen: A&O x 3. NAD. Appears " comfortable.   Wound status: Covered. Post op dressings are c/d/i. Mild erythema noted at the right axilla.   Circulation, motion and sensation: Able to flex and extend all digits on the right; distal upper extremity sensation is intact and equal bilaterally. Fingers are warm and well perfused.    Swelling: Mild      Pertinent Labs   Lab Results: personally reviewed.     Recent Labs   Lab Test 09/20/21  0601 09/19/21  0436 09/18/21  0442 09/17/21  0439 09/17/21  0439 09/16/21  0424 09/16/21  0424 09/15/21  1805 09/15/21  0951   HGB 13.3 12.8* 13.5   < > 13.3   < > 13.3  --  14.2   HCT  --  40.0 42.1  --  41.4   < > 41.0  --  43.8   MCV  --  84 86  --  86   < > 85  --  85   PLT  --  384 327  --  331   < > 303  --  293   NA  --   --   --   --  138  --  137  --  137   CRP  --   --  98.2*  --  135.0*  --  105.0*   < >  --     < > = values in this interval not displayed.       Plan:   Continue current cares and rehabilitation.  Leave post op dressings in place.   Sling for support as needed per pt request; discussed with nursing.   Recommend transitioning to oral antibiotics per medicine for 7-10 days.   No work until follow up.   Orthopedically stable. Discharge per medicine, to home later today.               Report completed by:  Andrzej Ryan PA-C  Date: 9/20/2021  Time: 9:34 AM

## 2021-09-20 NOTE — PLAN OF CARE
[facility] DISCHARGE NOTE    Patient discharged to home at 11:17 AM via wheel chair. Accompanied by spouse and staff. Discharge instructions reviewed with patient and spouse, opportunity offered to ask questions. Prescriptions sent to patients preferred pharmacy. All belongings sent with patient.    Anne Bang RN

## 2021-09-20 NOTE — PROGRESS NOTES
Doing well overnight.  IVF's discharge'd as tolerating PO intake and voiding well.  Good appetite and no n&v.  Denies pain and receiving scheduled tylenol and ice.  Up indep in room after walked a couple times with pt and very steady on feet.  Receiving scheduled IV abx.

## 2021-09-20 NOTE — DISCHARGE SUMMARY
"[facility] Hospitalist Discharge Summary    Daniele Carter MRN# [id number]   Age: 46 year old YOB: 1975     Date of Admission:  9/15/2021  Date of Discharge::  9/20/2021  Admitting Physician:  Chidi Chi MD  Discharge Physician:  Ori Lyn MD  Primary Physician: Elmer Horn  Transferring Facility: N/A     Home clinic: [facility]          Admission Diagnoses:   Septic olecranon bursitis of right elbow [M71.121]  Encounter for screening laboratory testing for severe acute respiratory syndrome coronavirus 2 (SARS-CoV-2) [Z20.822]  Septic bursitis of elbow, right [M71.121]          Discharge Diagnosis:     Principle diagnosis: Septic olecranon bursitis of right elbow  Secondary diagnoses:  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     Pain started acutely four days ago (9/12) while patient was helping to gut an old trailer \"with mouse poop and urine all over.\"  He did not have a known injury to the area, no known open lesions, but had been doing physical labor involving a lot of lifting and elbow movement.  The elbow pain was sudden and acute to the point he needed to stop the project early.  That evening he tried an NSAID at home.  He felt worse the following morning and went to urgent care where they prescribed Keflex 500 mg qid x 10 days.     Yesterday 9/14 he was not improving so he went to [facility] and underwent a right olecranon bursa arthrocentesis, which was sent for culture.   They prescribed him some Norco and advised continuation of the Keflex.  He felt significantly better after the arthrocentesis; the pain and edema were greatly reduced.     This morning he woke up and the erythema and edema were worse again.  They sent a picture through [employer] [employer] and were advised to go to the emergency department for evaluation, where it was determined that patient warrants hospital admission for IV antibiotics.       MRI R elbow- Impression:     1. Encapsulated fluid collection at the posterior lateral aspect of  the elbow is consistent with olecranon bursitis the sterility of which  can be confirmed with aspiration.     2. Extensive subcutaneous edema and enhancement extending along the  forearm consistent with cellulitis. Findings include prominent,  partially loculated fluid along the lateral aspect of the midforearm.  Additional abscess difficult to exclude.            Hospital Course:   Septic olecranon bursitis of right elbow  Right elbow cellulitis  Acute onset of right elbow pain on 9/12, progressively worsening. Evaluated in urgent care on 9/13 and prescribed Keflex 500 mg qid x 10 days. Evaluated by [facility] on 9/14, s/p right olecranon bursa arthrocentesis (6 mL yellow, cloudy and blood tinged aspirate). Erythema worsening despite Keflex, so advised to go to the emergency department on 9/15. Presented with low grade fever and leukocytosis. Aspirate from 9/14 growing gram positive cocci, culture shows MSSA  Was initially on vanco-changed to ancef couple days later. S/p surgery I/D right elbow septic bursa on 9/19. Afebrile  Will discharge home on clinda x 10 days.     SIRs criteria  Leukocytosis with left shift  2ndry to above. Admit WBC 16.1, ANC 13.4. Tmax 100.9. HR 90s-100s.   Improving. Plan as above.      Chest discomfort  History of acid reflux  Resolved. Likely due top GERD. Continue home  PPI.     Hypokalemia   Admit K = 3.2. Improved with repletion.     Mild persistent asthma  Stable respiratory status on admission, no evidence of exacerbation. Managed prior to admission with Arnuity daily (when he remembers) and prn albuterol.  Continue meds     COVID status  COVID PCR negative 9/15/2021  Patient had known COVID infection with positive PCR on 10/29/20  Patient has not been vaccinated, is not interested in vaccine at this time due to history of side effects with many medications, vitamins, and vaccines.               Procedures:   No procedures performed during this admission-surgery as above         Allergies:      Allergies   Allergen Reactions     Bee Anaphylaxis     Asa [Aspirin]      Unknown reaction- Hives???     Chocolate Flavor Hives             Medications Prior to Admission:     Medications Prior to Admission   Medication Sig Dispense Refill Last Dose     albuterol (PROAIR HFA/PROVENTIL HFA/VENTOLIN HFA) 108 (90 Base) MCG/ACT inhaler Inhale 2 puffs into the lungs every 6 hours This is your rescue inhaler. 1 Inhaler 11 Past Month at Unknown time     EPINEPHrine (ANY BX GENERIC EQUIV) 0.3 MG/0.3ML injection 2-pack Inject 0.3 mLs (0.3 mg) into the muscle once as needed for anaphylaxis 0.6 mL 3      fluticasone (ARNUITY ELLIPTA) 200 MCG/ACT inhaler Inhale 1 puff into the lungs daily 30 each 3 Past Week at Unknown time     HYDROcodone-acetaminophen (NORCO) 5-325 MG tablet Take 1 tablet by mouth every 6 hours as needed for severe pain 10 tablet 0 9/15/2021 at 0300     naproxen (NAPROSYN) 500 MG tablet Take 1 tablet (500 mg) by mouth 2 times daily (with meals) 60 tablet 1 9/14/2021     omeprazole (PRILOSEC) 40 MG DR capsule Take 1 capsule by mouth once daily 90 capsule 3 9/14/2021 at Unknown time     [DISCONTINUED] cephALEXin (KEFLEX) 500 MG capsule Take 1 capsule (500 mg) by mouth 4 times daily for 10 days 40 capsule 0 9/15/2021 at 0330             Discharge Medications:     Current Discharge  Medication List      START taking these medications    Details   clindamycin (CLEOCIN) 150 MG capsule Take 3 capsules (450 mg) by mouth 3 times daily  Qty: 90 capsule, Refills: 0    Associated Diagnoses: Septic bursitis of elbow, right      oxyCODONE (ROXICODONE) 5 MG tablet Take 0.5-1 tablets (2.5-5 mg) by mouth every 4 hours as needed for moderate to severe pain  Qty: 10 tablet, Refills: 0    Associated Diagnoses: Septic olecranon bursitis of right elbow         CONTINUE these medications which have NOT CHANGED    Details   albuterol (PROAIR HFA/PROVENTIL HFA/VENTOLIN HFA) 108 (90 Base) MCG/ACT inhaler Inhale 2 puffs into the lungs every 6 hours This is your rescue inhaler.  Qty: 1 Inhaler, Refills: 11    Comments: Pharmacy may dispense brand covered by insurance (Proair, or proventil or ventolin or generic albuterol inhaler). The patient requests that this prescription be held on file for filling in the near future.  Associated Diagnoses: Mild persistent asthma without complication      EPINEPHrine (ANY BX GENERIC EQUIV) 0.3 MG/0.3ML injection 2-pack Inject 0.3 mLs (0.3 mg) into the muscle once as needed for anaphylaxis  Qty: 0.6 mL, Refills: 3    Associated Diagnoses: Toxic reaction to hornets, wasps and bees, accidental or unintentional, initial encounter      fluticasone (ARNUITY ELLIPTA) 200 MCG/ACT inhaler Inhale 1 puff into the lungs daily  Qty: 30 each, Refills: 3    Associated Diagnoses: Mild persistent asthma without complication      HYDROcodone-acetaminophen (NORCO) 5-325 MG tablet Take 1 tablet by mouth every 6 hours as needed for severe pain  Qty: 10 tablet, Refills: 0    Associated Diagnoses: Septic olecranon bursitis of right elbow      naproxen (NAPROSYN) 500 MG tablet Take 1 tablet (500 mg) by mouth 2 times daily (with meals)  Qty: 60 tablet, Refills: 1    Associated Diagnoses: Olecranon bursitis of right elbow      omeprazole (PRILOSEC) 40 MG DR capsule Take 1 capsule by mouth once daily  Qty:  "90 capsule, Refills: 3    Associated Diagnoses: Gastroesophageal reflux disease, unspecified whether esophagitis present         STOP taking these medications       cephALEXin (KEFLEX) 500 MG capsule Comments:   Reason for Stopping:                     Consultations:   Consultation during this admission received from orthopedics            Discharge Exam:   Blood pressure (!) 144/87, pulse 100, temperature 97.6  F (36.4  C), temperature source Oral, resp. rate 16, height 1.702 m (5' 7\"), weight 88.2 kg (194 lb 7.1 oz), SpO2 96 %.  GENERAL APPEARANCE: healthy, alert and no distress  EYES: conjunctiva clear, eyes grossly normal  HENT: external ears and nose normal   NECK: supple, no masses or adenopathy  RESP: lungs clear to auscultation - no rales, rhonchi or wheezes  CV: regular rate and rhythm, normal S1 S2, no S3 or S4 and no murmur, click or rub   ABDOMEN: soft, nontender, no HSM or masses and bowel sounds normal  MS: no clubbing, cyanosis; no edema-R arm in dressing  SKIN: clear without significant rashes or lesions  NEURO: Normal strength and tone, sensory exam grossly normal, mentation intact and speech normal    Unresulted Labs Ordered in the Past 30 Days of this Admission     Date and Time Order Name Status Description    9/19/2021  1:14 PM Swab Aerobic Bacterial Culture Routine In process     9/19/2021  1:14 PM Anaerobic Bacterial Culture Routine In process     9/16/2021  3:02 PM Blood Culture Hand, Right Preliminary     9/16/2021  3:02 PM Blood Culture Hand, Left Preliminary           No results found for this or any previous visit (from the past 24 hour(s)).         Pending Tests at Discharge:   None         Discharge Instructions and Follow-Up:     Discharge diet: Regular   Discharge activity: Activity as tolerated   Discharge follow-up: Follow up with primary care provider in 1-2 weeks  F/u ortho in 2 weeks           Discharge Disposition:     Discharged to home      Attestation:  I have reviewed " today's vital signs, notes, medications, labs and imaging.    Time Spent on this Encounter   I, Ori Lyn MD, personally saw the patient today and spent greater than 30 minutes discharging this patient.    Ori Lyn MD

## 2021-09-21 LAB
BACTERIA BLD CULT: NO GROWTH
BACTERIA BLD CULT: NO GROWTH

## 2021-09-22 LAB — BACTERIA SPEC CULT: ABNORMAL

## 2021-09-30 ENCOUNTER — TRANSFERRED RECORDS (OUTPATIENT)
Dept: HEALTH INFORMATION MANAGEMENT | Facility: CLINIC | Age: 46
End: 2021-09-30

## 2021-10-03 LAB — BACTERIA SPEC CULT: NORMAL

## 2021-11-09 DIAGNOSIS — K21.9 GASTROESOPHAGEAL REFLUX DISEASE, UNSPECIFIED WHETHER ESOPHAGITIS PRESENT: ICD-10-CM

## 2021-11-10 RX ORDER — OMEPRAZOLE 40 MG/1
CAPSULE, DELAYED RELEASE ORAL
Qty: 90 CAPSULE | Refills: 0 | Status: SHIPPED | OUTPATIENT
Start: 2021-11-10 | End: 2022-02-02

## 2021-11-17 ENCOUNTER — OFFICE VISIT (OUTPATIENT)
Dept: FAMILY MEDICINE | Facility: CLINIC | Age: 46
End: 2021-11-17
Payer: COMMERCIAL

## 2021-11-17 VITALS
RESPIRATION RATE: 20 BRPM | SYSTOLIC BLOOD PRESSURE: 124 MMHG | HEART RATE: 80 BPM | DIASTOLIC BLOOD PRESSURE: 88 MMHG | WEIGHT: 189 LBS | TEMPERATURE: 98.2 F | BODY MASS INDEX: 29.6 KG/M2

## 2021-11-17 DIAGNOSIS — M54.50 CHRONIC BILATERAL LOW BACK PAIN WITHOUT SCIATICA: ICD-10-CM

## 2021-11-17 DIAGNOSIS — S01.01XD LACERATION OF SCALP, SUBSEQUENT ENCOUNTER: Primary | ICD-10-CM

## 2021-11-17 DIAGNOSIS — G89.29 CHRONIC BILATERAL LOW BACK PAIN WITHOUT SCIATICA: ICD-10-CM

## 2021-11-17 PROBLEM — Z11.52 ENCOUNTER FOR SCREENING LABORATORY TESTING FOR SEVERE ACUTE RESPIRATORY SYNDROME CORONAVIRUS 2 (SARS-COV-2): Status: RESOLVED | Noted: 2021-09-16 | Resolved: 2021-11-17

## 2021-11-17 PROCEDURE — 99213 OFFICE O/P EST LOW 20 MIN: CPT | Performed by: PHYSICIAN ASSISTANT

## 2021-11-17 ASSESSMENT — ASTHMA QUESTIONNAIRES
QUESTION_1 LAST FOUR WEEKS HOW MUCH OF THE TIME DID YOUR ASTHMA KEEP YOU FROM GETTING AS MUCH DONE AT WORK, SCHOOL OR AT HOME: NONE OF THE TIME
ACT_TOTALSCORE: 24
QUESTION_2 LAST FOUR WEEKS HOW OFTEN HAVE YOU HAD SHORTNESS OF BREATH: NOT AT ALL
QUESTION_3 LAST FOUR WEEKS HOW OFTEN DID YOUR ASTHMA SYMPTOMS (WHEEZING, COUGHING, SHORTNESS OF BREATH, CHEST TIGHTNESS OR PAIN) WAKE YOU UP AT NIGHT OR EARLIER THAN USUAL IN THE MORNING: NOT AT ALL
QUESTION_4 LAST FOUR WEEKS HOW OFTEN HAVE YOU USED YOUR RESCUE INHALER OR NEBULIZER MEDICATION (SUCH AS ALBUTEROL): NOT AT ALL
QUESTION_5 LAST FOUR WEEKS HOW WOULD YOU RATE YOUR ASTHMA CONTROL: WELL CONTROLLED

## 2021-11-17 NOTE — NURSING NOTE
"Chief Complaint   Patient presents with     ER F/U       Initial /88 (BP Location: Right arm)   Pulse 80   Temp 98.2  F (36.8  C) (Tympanic)   Resp 20   Wt 85.7 kg (189 lb)   BMI 29.60 kg/m   Estimated body mass index is 29.6 kg/m  as calculated from the following:    Height as of 9/15/21: 1.702 m (5' 7\").    Weight as of this encounter: 85.7 kg (189 lb).    Patient presents to the clinic using No DME    Health Maintenance that is potentially due pending provider review:  NONE    n/a    Is there anyone who you would like to be able to receive your results? No  If yes have patient fill out TRAVON    "

## 2021-11-17 NOTE — PROGRESS NOTES
Assessment & Plan     Laceration of scalp, subsequent encounter  Staples removed today    Chronic bilateral low back pain without sciatica  Refill stable  - cyclobenzaprine (FLEXERIL) 10 MG tablet; Take 1 tablet (10 mg) by mouth 3 times daily as needed for muscle spasms    There are no Patient Instructions on file for this visit.    No follow-ups on file.    Lauren Valdez PA-C  [facility]    Mendy Sanabria is a 46 year old who presents for the following health issues     HPI     ED/UC Followup:    Facility:  [place] [facility]  Date of visit: 11/05/2021  Reason for visit: laceration of scalp  Current Status: staple removal   Hit on head by a metal ladder  Doing well    Needs refill of flexeril, chronic med.        Objective    /88 (BP Location: Right arm)   Pulse 80   Temp 98.2  F (36.8  C) (Tympanic)   Resp 20   Wt 85.7 kg (189 lb)   BMI 29.60 kg/m    Body mass index is 29.6 kg/m .  Physical Exam   Well healed, 4 staples     Implemented All Universal Safety Interventions:  [place] to call system. Call bell, personal items and telephone within reach. Instruct patient to call for assistance. Room bathroom lighting operational. Non-slip footwear when patient is off stretcher. Physically safe environment: no spills, clutter or unnecessary equipment. Stretcher in lowest position, wheels locked, appropriate side rails in place.

## 2021-11-18 RX ORDER — CYCLOBENZAPRINE HCL 10 MG
10 TABLET ORAL 3 TIMES DAILY PRN
Qty: 40 TABLET | Refills: 3 | Status: SHIPPED | OUTPATIENT
Start: 2021-11-18

## 2021-11-18 ASSESSMENT — ASTHMA QUESTIONNAIRES: ACT_TOTALSCORE: 24

## 2021-11-29 ENCOUNTER — MYC MEDICAL ADVICE (OUTPATIENT)
Dept: FAMILY MEDICINE | Facility: CLINIC | Age: 46
End: 2021-11-29
Payer: COMMERCIAL

## 2021-11-30 ENCOUNTER — E-VISIT (OUTPATIENT)
Dept: FAMILY MEDICINE | Facility: CLINIC | Age: 46
End: 2021-11-30
Payer: COMMERCIAL

## 2021-11-30 DIAGNOSIS — Z20.822 SUSPECTED COVID-19 VIRUS INFECTION: Primary | ICD-10-CM

## 2021-11-30 PROCEDURE — 99421 OL DIG E/M SVC 5-10 MIN: CPT | Performed by: INTERNAL MEDICINE

## 2021-11-30 NOTE — PATIENT INSTRUCTIONS
Dear Daniele Carter,    Your symptoms show that you may have coronavirus (COVID-19). This illness can cause fever, cough and trouble breathing. Many people get a mild case and get better on their own. Some people can get very sick.    Will I be tested for COVID-19?  We would like to test you for Covid-19 virus. I have placed orders for this test.     To schedule: go to your [employer] home page and scroll down to the section that says  You have an appointment that needs to be scheduled  and click the large green button that says  Schedule Now  and follow the steps to find the next available openings.    If you are unable to complete these [employer] scheduling steps, please call [phone] to schedule your testing.     Return to work/school/ guidance:  Please let your workplace manager and staffing office know when your quarantine ends     We can t give you an exact date as it depends on the above. You can calculate this on your own or work with your manager/staffing office to calculate this. (For example if you were exposed on 10/4, you would have to quarantine for 14 full days. That would be through 10/18. You could return on 10/19.)      If you receive a positive COVID-19 test result, follow the guidance of the those who are giving you the results. Usually the return to work is 10 (or in some cases 20 days from symptom onset.) If you work at [facility], you must also be cleared by Employee Occupational Health and Safety to return to work.        If you receive a negative COVID-19 test result and did not have a high risk exposure to someone with a known positive COVID-19 test, you can return to work once you're free of fever for 24 hours without fever-reducing medication and your symptoms are improving or resolved.      If you receive a negative COVID-19 test and If you had a high risk exposure to someone who has tested positive for COVID-19 then you can return to work 14 days after your last  contact with the positive individual    Note: If you have ongoing exposure to the covid positive person, this quarantine period may be more than 14 days. (For example, if you are continued to be exposed to your child who tested positive and cannot isolate from them, then the quarantine of 7-14 days can't start until your child is no longer contagious. This is typically 10 days from onset of the child's symptoms. So the total duration may be 17-24 days in this case.)    Sign up for [employer].   We know it's scary to hear that you might have COVID-19. We want to track your symptoms to make sure you're okay over the next 2 weeks. Please look for an email from [employer]--this is a free, online program that we'll use to keep in touch. To sign up, follow the link in the email you will receive. Learn more at [email or web address]    How can I take care of myself?    Get lots of rest. Drink extra fluids (unless a doctor has told you not to)    Take Tylenol (acetaminophen) or ibuprofen for fever or pain. If you have liver or kidney problems, ask your family doctor if it's okay to take Tylenol o ibuprofen    If you have other health problems (like cancer, heart failure, an organ transplant or severe kidney disease): Call your specialty clinic if you don't feel better in the next 2 days.    Know when to call 911. Emergency warning signs include:  o Trouble breathing or shortness of breath  o Pain or pressure in the chest that doesn't go away  o Feeling confused like you haven't felt before, or not being able to wake up  o Bluish-colored lips or face    Where can I get more information?  [facility] - About COVID-19:   [email or web address]    CDC - What to Do If You're Sick:   www.cdc.gov/coronavirus/2019-ncov/about/steps-when-sick.html    November 30, 2021  RE:  Daniele Carter                                                                                                                   [address] [place] [id number]      To whom it may concern:    I evaluated Daniele Carter on November 30, 2021. Daniele Carter should be excused from work/school.     They should let their workplace manager and staffing office know when their quarantine ends.    We can not give an exact date as it depends on the information below. They can calculate this on their own or work with their manager/staffing office to calculate this. (For example if they were exposed on 10/04, they would have to quarantine for 14 full days. That would be through 10/18. They could return on 10/19.)    Quarantine Guidelines:      If patient receives a positive COVID-19 test result, they should follow the guidance of those who are giving the results. Usually the return to work is 10 (or in some cases 20 days from symptom onset.) If they work at [employer], they must be cleared by Employee Occupational Health and Safety to return to work.        If patient receives a negative COVID-19 test result and did not have a high risk exposure to someone with a known positive COVID-19 test, they can return to work once they're free of fever for 24 hours without fever-reducing medication and their symptoms are improving or resolved.      If patient receives a negative COVID-19 test and if they had a high risk exposure to someone who has tested positive for COVID-19 then they can return to work 14 days after their last contact with the positive individual    Note: If there is ongoing exposure to the covid positive person, this quarantine period may be longer than 14 days. (For example, if they are continually exposed to their child, who tested positive and cannot isolate from them, then the quarantine of 7-14 days can't start until their child is no longer contagious. This is typically 10 days from onset to the child's symptoms. So the total duration may be 17-24 days in this case.)     Sincerely,  Elmer Horn MD

## 2022-01-02 ENCOUNTER — HEALTH MAINTENANCE LETTER (OUTPATIENT)
Age: 47
End: 2022-01-02

## 2022-02-07 ENCOUNTER — VIRTUAL VISIT (OUTPATIENT)
Dept: FAMILY MEDICINE | Facility: CLINIC | Age: 47
End: 2022-02-07
Payer: COMMERCIAL

## 2022-02-07 DIAGNOSIS — Z51.81 ENCOUNTER FOR THERAPEUTIC DRUG MONITORING: ICD-10-CM

## 2022-02-07 DIAGNOSIS — Z13.220 LIPID SCREENING: ICD-10-CM

## 2022-02-07 DIAGNOSIS — K21.9 GASTROESOPHAGEAL REFLUX DISEASE, UNSPECIFIED WHETHER ESOPHAGITIS PRESENT: Primary | ICD-10-CM

## 2022-02-07 PROCEDURE — 99213 OFFICE O/P EST LOW 20 MIN: CPT | Mod: TEL | Performed by: INTERNAL MEDICINE

## 2022-02-07 RX ORDER — OMEPRAZOLE 40 MG/1
CAPSULE, DELAYED RELEASE ORAL
Qty: 90 CAPSULE | Refills: 1 | Status: SHIPPED | OUTPATIENT
Start: 2022-02-07 | End: 2022-10-10

## 2022-02-07 NOTE — PROGRESS NOTES
Frank is a 46 year old who is being evaluated via a billable telephone visit.      What phone number would you like to be contacted at? [phone]  How would you like to obtain your AVS? MyChart    Assessment & Plan     Gastroesophageal reflux disease, unspecified whether esophagitis present    Frank is trying to work on dietary improvement (specifically reducing coffee intake) to improve reflux and try to reduce the frequency of his PPI usage, but is still needing to take it most days of the week.  He is going to continue to work on diet and would like to continue current dose, med refilled.  Recommended monitoring yearly creatinine, lab due in September.    He gets some occasional left abdominal pain that is associated with reflux symptoms, so is most likely related.  Advised him to follow-up if he notes pain that occurs without associated reflux.  He notes blood on the toilet paper with wiping on occasion but no blood mixed in with the stool- advised him to watch for this and follow-up if that develops.     - omeprazole (PRILOSEC) 40 MG DR capsule; Take 1 capsule by mouth once daily.    Encounter for therapeutic drug monitoring    - Creatinine; Future    Lipid screening    - Lipid panel reflex to direct LDL Fasting; Future      18 minutes spent on the date of the encounter doing chart review, history and exam, documentation and further activities per the note         Return in about 7 months (around 9/7/2022) for Lab appointment.    Elmer Horn MD  [facility]    Subjective   Frank is a 46 year old who presents for the following health issues     HPI     Medication Followup of Omeprazole 40 mg    Taking Medication as prescribed: yes, though over the past week, he has tried to not take it every day, skips it one or two times per week. He is trying to avoid drinking coffee, which triggers his acid reflux and is the #1 problem with causing reflux.  Gets occasional left abdominal pain when the  reflux is flaring up. Occasionally has some blood with wiping, no blood mixed in with stool.      Side Effects:  None    Medication Helping Symptoms:  yes             Review of Systems   Constitutional, GI systems are negative, except as otherwise noted.      Objective           Vitals:  No vitals were obtained today due to virtual visit.    Physical Exam   healthy, alert and no distress  PSYCH: Alert and oriented times 3; coherent speech, normal   rate and volume, able to articulate logical thoughts, able   to abstract reason, no tangential thoughts, no hallucinations   or delusions  His affect is normal  RESP: No cough, no audible wheezing, able to talk in full sentences  Remainder of exam unable to be completed due to telephone visits          Phone call duration: 10 minutes

## 2022-02-16 ENCOUNTER — MYC MEDICAL ADVICE (OUTPATIENT)
Dept: ADMISSION | Facility: CLINIC | Age: 47
End: 2022-02-16
Payer: COMMERCIAL

## 2022-03-09 NOTE — TELEPHONE ENCOUNTER
Patient called back and will need intermittent leave, starting 3/9/22 and ending 9/9/22. He will need 1-2 days of leave per month. Ailyn Santos on 3/9/2022 at 9:06 AM

## 2022-03-11 NOTE — TELEPHONE ENCOUNTER
Patient called and message left on VM that form is completed and waiting for patient at  of [facility]. Ailyn Santos on 3/11/2022 at 8:17 AM

## 2022-03-11 NOTE — TELEPHONE ENCOUNTER
Form completed, signed, and copy of form sent to scan and copy placed in cabinet. Please notify patient that form is ready for . [phone]. Form placed at  for .   Thank you.

## 2022-05-25 NOTE — LETTER
[facility]  [address]  [facility] [address]  Phone: [phone]    March 20, 2015    Daniele SCOTT Emma  [address] [address]              Dear Wilton Emma,    Your [facility] Team works hard to make sure that you and your family receive exceptional care. Enclosed you will find a copy of the Asthma Control Test (ACT) that our clinic uses to monitor and manage your asthma. This test is an assessment tool that we use to determine how well your asthma is controlled.  Please keep a copy of the ACT for your reference when we call you to complete this assessment.   We will call you within the next 2 weeks to review the questionnaire with you.      Sincerely,      Your [facility] Team     Patient's belongings returned

## 2022-06-09 NOTE — TELEPHONE ENCOUNTER
Reason for Call:  Other call back    Detailed comments: Patient is calling stating that his job is requiring all employees to get the COVID vaccine and he said the last visit with PCP Justina told him that he should not get it be cause of his allergies. He wants to know what she still thinks and what he should do about his work. He said its not due until 9/8/2021     Phone Number Patient can be reached at: Cell number on file:    Telephone Information:   Mobile [phone]       Best Time: any    Can we leave a detailed message on this number? YES    Call taken on 8/13/2021 at 9:10 AM by Margy Malloy       yes

## 2022-09-01 ASSESSMENT — ASTHMA QUESTIONNAIRES
QUESTION_5 LAST FOUR WEEKS HOW WOULD YOU RATE YOUR ASTHMA CONTROL: WELL CONTROLLED
ACT_TOTALSCORE: 20
ACT_TOTALSCORE: 20
QUESTION_1 LAST FOUR WEEKS HOW MUCH OF THE TIME DID YOUR ASTHMA KEEP YOU FROM GETTING AS MUCH DONE AT WORK, SCHOOL OR AT HOME: A LITTLE OF THE TIME
QUESTION_2 LAST FOUR WEEKS HOW OFTEN HAVE YOU HAD SHORTNESS OF BREATH: ONCE OR TWICE A WEEK
QUESTION_4 LAST FOUR WEEKS HOW OFTEN HAVE YOU USED YOUR RESCUE INHALER OR NEBULIZER MEDICATION (SUCH AS ALBUTEROL): ONCE A WEEK OR LESS
QUESTION_3 LAST FOUR WEEKS HOW OFTEN DID YOUR ASTHMA SYMPTOMS (WHEEZING, COUGHING, SHORTNESS OF BREATH, CHEST TIGHTNESS OR PAIN) WAKE YOU UP AT NIGHT OR EARLIER THAN USUAL IN THE MORNING: ONCE OR TWICE

## 2022-09-07 ENCOUNTER — MYC MEDICAL ADVICE (OUTPATIENT)
Dept: ADMISSION | Facility: CLINIC | Age: 47
End: 2022-09-07

## 2022-09-07 ENCOUNTER — OFFICE VISIT (OUTPATIENT)
Dept: FAMILY MEDICINE | Facility: CLINIC | Age: 47
End: 2022-09-07
Payer: COMMERCIAL

## 2022-09-07 VITALS
HEIGHT: 68 IN | HEART RATE: 96 BPM | SYSTOLIC BLOOD PRESSURE: 120 MMHG | DIASTOLIC BLOOD PRESSURE: 84 MMHG | BODY MASS INDEX: 28.16 KG/M2 | TEMPERATURE: 98 F | RESPIRATION RATE: 20 BRPM | OXYGEN SATURATION: 98 % | WEIGHT: 185.8 LBS

## 2022-09-07 DIAGNOSIS — G89.29 CHRONIC ELBOW PAIN, RIGHT: ICD-10-CM

## 2022-09-07 DIAGNOSIS — Z98.890 HISTORY OF ELBOW SURGERY: ICD-10-CM

## 2022-09-07 DIAGNOSIS — K21.9 GASTROESOPHAGEAL REFLUX DISEASE, UNSPECIFIED WHETHER ESOPHAGITIS PRESENT: ICD-10-CM

## 2022-09-07 DIAGNOSIS — Z51.81 ENCOUNTER FOR THERAPEUTIC DRUG MONITORING: ICD-10-CM

## 2022-09-07 DIAGNOSIS — M79.672 CHRONIC HEEL PAIN, LEFT: ICD-10-CM

## 2022-09-07 DIAGNOSIS — G89.29 CHRONIC HEEL PAIN, LEFT: ICD-10-CM

## 2022-09-07 DIAGNOSIS — M25.521 CHRONIC ELBOW PAIN, RIGHT: ICD-10-CM

## 2022-09-07 DIAGNOSIS — Z11.59 NEED FOR HEPATITIS C SCREENING TEST: ICD-10-CM

## 2022-09-07 DIAGNOSIS — R10.9 LEFT SIDED ABDOMINAL PAIN: Primary | ICD-10-CM

## 2022-09-07 DIAGNOSIS — Z12.11 SCREEN FOR COLON CANCER: ICD-10-CM

## 2022-09-07 LAB
ALBUMIN SERPL BCG-MCNC: 4.3 G/DL (ref 3.5–5.2)
ALBUMIN UR-MCNC: NEGATIVE MG/DL
ALP SERPL-CCNC: 89 U/L (ref 40–129)
ALT SERPL W P-5'-P-CCNC: 60 U/L (ref 10–50)
ANION GAP SERPL CALCULATED.3IONS-SCNC: 11 MMOL/L (ref 7–15)
APPEARANCE UR: CLEAR
AST SERPL W P-5'-P-CCNC: 31 U/L (ref 10–50)
BASOPHILS # BLD AUTO: 0 10E3/UL (ref 0–0.2)
BASOPHILS NFR BLD AUTO: 0 %
BILIRUB SERPL-MCNC: 0.7 MG/DL
BILIRUB UR QL STRIP: NEGATIVE
BUN SERPL-MCNC: 17.3 MG/DL (ref 6–20)
CALCIUM SERPL-MCNC: 9 MG/DL (ref 8.6–10)
CHLORIDE SERPL-SCNC: 104 MMOL/L (ref 98–107)
COLOR UR AUTO: YELLOW
CREAT SERPL-MCNC: 0.76 MG/DL (ref 0.67–1.17)
DEPRECATED HCO3 PLAS-SCNC: 23 MMOL/L (ref 22–29)
EOSINOPHIL # BLD AUTO: 0.3 10E3/UL (ref 0–0.7)
EOSINOPHIL NFR BLD AUTO: 3 %
ERYTHROCYTE [DISTWIDTH] IN BLOOD BY AUTOMATED COUNT: 13.5 % (ref 10–15)
GFR SERPL CREATININE-BSD FRML MDRD: >90 ML/MIN/1.73M2
GLUCOSE SERPL-MCNC: 113 MG/DL (ref 70–99)
GLUCOSE UR STRIP-MCNC: NEGATIVE MG/DL
HCT VFR BLD AUTO: 49.5 % (ref 40–53)
HCV AB SERPL QL IA: NONREACTIVE
HGB BLD-MCNC: 15.5 G/DL (ref 13.3–17.7)
HGB UR QL STRIP: NEGATIVE
KETONES UR STRIP-MCNC: NEGATIVE MG/DL
LEUKOCYTE ESTERASE UR QL STRIP: NEGATIVE
LIPASE SERPL-CCNC: 47 U/L (ref 13–60)
LYMPHOCYTES # BLD AUTO: 2.3 10E3/UL (ref 0.8–5.3)
LYMPHOCYTES NFR BLD AUTO: 25 %
MCH RBC QN AUTO: 27 PG (ref 26.5–33)
MCHC RBC AUTO-ENTMCNC: 31.3 G/DL (ref 31.5–36.5)
MCV RBC AUTO: 86 FL (ref 78–100)
MONOCYTES # BLD AUTO: 0.9 10E3/UL (ref 0–1.3)
MONOCYTES NFR BLD AUTO: 9 %
NEUTROPHILS # BLD AUTO: 5.7 10E3/UL (ref 1.6–8.3)
NEUTROPHILS NFR BLD AUTO: 62 %
NITRATE UR QL: NEGATIVE
PH UR STRIP: 5.5 [PH] (ref 5–7)
PLATELET # BLD AUTO: 366 10E3/UL (ref 150–450)
POTASSIUM SERPL-SCNC: 4.4 MMOL/L (ref 3.4–5.3)
PROT SERPL-MCNC: 7.5 G/DL (ref 6.4–8.3)
RBC # BLD AUTO: 5.74 10E6/UL (ref 4.4–5.9)
SODIUM SERPL-SCNC: 138 MMOL/L (ref 136–145)
SP GR UR STRIP: 1.02 (ref 1–1.03)
UROBILINOGEN UR STRIP-ACNC: 0.2 E.U./DL
WBC # BLD AUTO: 9.1 10E3/UL (ref 4–11)

## 2022-09-07 PROCEDURE — 99214 OFFICE O/P EST MOD 30 MIN: CPT | Performed by: FAMILY MEDICINE

## 2022-09-07 PROCEDURE — 85025 COMPLETE CBC W/AUTO DIFF WBC: CPT | Performed by: FAMILY MEDICINE

## 2022-09-07 PROCEDURE — 86803 HEPATITIS C AB TEST: CPT | Performed by: FAMILY MEDICINE

## 2022-09-07 PROCEDURE — 81003 URINALYSIS AUTO W/O SCOPE: CPT | Performed by: FAMILY MEDICINE

## 2022-09-07 PROCEDURE — 80053 COMPREHEN METABOLIC PANEL: CPT | Performed by: FAMILY MEDICINE

## 2022-09-07 PROCEDURE — 83690 ASSAY OF LIPASE: CPT | Performed by: FAMILY MEDICINE

## 2022-09-07 PROCEDURE — 36415 COLL VENOUS BLD VENIPUNCTURE: CPT | Performed by: FAMILY MEDICINE

## 2022-09-07 ASSESSMENT — PAIN SCALES - GENERAL: PAINLEVEL: NO PAIN (1)

## 2022-09-07 NOTE — PROGRESS NOTES
Assessment & Plan     Left sided abdominal pain  Patient verified it feels like worsened acid reflux. Reviewed his PPI use - he is to take it daily.  Possible occult intraabdominal or GI condition to be ruled out - tests ordered.  No clear indication for abdominal imaging at this time.  Advised lifestyle changes in detail. Recommended symptom diary.  Depending on test results, may pursue further testing.  Return precautions discussed and given to patient.   - CBC with Platelets & Differential  - Comprehensive metabolic panel  - Lipase  - UA reflex to Microscopic  - CBC with Platelets & Differential  - Comprehensive metabolic panel  - Lipase  - UA reflex to Microscopic    Gastroesophageal reflux disease, unspecified whether esophagitis present  Continue omepraole 40 mg daily.  Advised lifestyle changes to prevent worsening or symptoms.  Return precautions discussed and given to patient.   Consider repeat EGD if no improvement after a few weeks.    Chronic heel pain, left  Suspect bone spur.  xR obtained to visualize area of concern.  Likely referral to podiatry.  Advised home treatment for heel pain.  - XR Calcaneus Left G/E 2 Views    Chronic elbow pain, right  History of elbow surgery  No acute joint today.  Possible pain from scarring vs chronic tendinosis.  Follow up with ortho - was last seen 10 months ago. Patient will call [facility].    Need for hepatitis C screening test  Offered screening based on current recommendations. Patient concurred to screen.  - Hepatitis C Screen Reflex to HCV RNA Quant and Genotype  - Hepatitis C Screen Reflex to HCV RNA Quant and Genotype    Screen for colon cancer  Offered screening based on current recommendations. Patient concurred to screen.  - Colonoscopy Screening  Referral    56}     Patient Instructions   Continue omeprazole.  Be aware of food triggers that increases acid reflux symptoms and avoid them.    You will be contacted in 1-2 days for results of your lab  tests.     Reduce alcohol consumption to less than 2 drinks per day.    Xray of heel today - result in 24 hour. Likely referral to podiatry depending on findings.    Acetaminophen 500 mg orally 1-2 tablets every 4-6 hrs as needed for pain .  Avoid naproxen or ibuprofen as these can increase acid reflux.    Schedule follow up with the orthopedics provider who you saw at [facility] in [place] for the recurrent elbow pain.      Return in about 1 month (around 10/7/2022), or if symptoms worsen or fail to improve.    Corby Thornton MD  [facility] OVIDIO Marie is a 47 year old presenting for the following health issues:  Elbow Pain, Musculoskeletal Problem (Left heel pain/), and Abdominal Pain      History of Present Illness       Reason for visit:  Right elbow has been hurting and been having issues with my left foot heal.    He eats 2-3 servings of fruits and vegetables daily.He consumes 0 sweetened beverage(s) daily.He exercises with enough effort to increase his heart rate 20 to 29 minutes per day.  He exercises with enough effort to increase his heart rate 7 days per week.   He is taking medications regularly.       Pain History:  When did you first notice your pain? - Acute Pain   Have you seen anyone else for your pain?   Where in your body do you have pain? Abdominal/Flank Pain  Onset/Duration: over 1 year  Description:   Character: Sharp  Location: left upper quadrant left lower quadrant  Radiation: None  Intensity: 1/10  today  Progression of Symptoms:  same and intermittent  Accompanying Signs & Symptoms:  Fever/Chills: No  Gas/Bloating: No  Nausea: No  Vomitting: No  Diarrhea: YES  Constipation: No  Dysuria or Hematuria: One time 2 months ago  History:   Trauma: No  Previous similar pain: YES- many years ago  Previous tests done: ultrasound, endoscopy - upper and colonoscopy - gastric polyps found in 2017 - benign biopsy  Normal CT scan abdomen in  "2011.  Precipitating factors:   Does the pain change with:     Food: YES- better    Bowel Movement: YES- better    Urination: No   Other factors:  Hurts when he lays on his left side  Therapies tried and outcome: None     Musculoskeletal problem/pain  Onset/Duration: Since December  Description  Location: elbow - right  Joint Swelling: No  Redness: No  Pain: YES  Warmth: No  Intensity:  1/10 today, worse is 6  Progression of Symptoms:  same  Accompanying signs and symptoms:   Fevers: No  Numbness/tingling/weakness: YES- numbness in fingers  History  Trauma to the area: No  Recent illness:  No  Previous similar problem: No  Previous evaluation:  No  Precipitating or alleviating factors:  Aggravating factors include: overuse and hitting the elbow  Therapies tried and outcome: nothing    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      Onset/Duration: Since December 2021  Description  Location: heel - left  Joint Swelling: No  Redness: maybe  Pain: YES  Warmth: No  Intensity:  2/10  Progression of Symptoms:  same  Accompanying signs and symptoms:   Fevers: No  Numbness/tingling/weakness: No  History  Trauma to the area: No  Recent illness:  No  Previous similar problem: No  Previous evaluation:  No  Precipitating or alleviating factors:  Aggravating factors include: standing, walking and too long in 1 spot  Therapies tried and outcome: nothing      Review of Systems   Constitutional, HEENT, cardiovascular, pulmonary, GI, , musculoskeletal, neuro, skin, endocrine and psych systems are negative, except as otherwise noted.      Objective    /84 (BP Location: Left arm, Patient Position: Chair, Cuff Size: Adult Regular)   Pulse 96   Temp 98  F (36.7  C) (Tympanic)   Resp 20   Ht 1.727 m (5' 8\")   Wt 84.3 kg (185 lb 12.8 oz)   SpO2 98%   BMI 28.25 kg/m    Body mass index is 28.25 kg/m .  Physical Exam   GEN: alert, oriented x 3, NAD, ambulatory w/o assist  SKIN: no erythema or rash  RIGHT " ELBOW: no induration or swelling; old healed scar over olecranon process; no TTP; full range of motion with mild olecranon pain on full flexion; no crepitation on range of motion; negative Tinel's on medial or lateral epicondyles  LEFT FOOT: no gross deformity or swelling; moderate TTP of the plantar aspect of the heel  ABD: rounded; soft; non-tender today; no palpable mass or organomegapy    Results for orders placed or performed in visit on 09/07/22   XR Calcaneus Left G/E 2 Views     Status: None    Narrative    XR CALCANEUS LEFT G/E 2 VIEWS 9/7/2022 10:46 AM     HISTORY: Chronic heel pain, left; Chronic heel pain, left    COMPARISON: None.       Impression    IMPRESSION: Normal joint spaces and alignment. No fracture, with  attention to the calcaneus.    STEFANIE JACKSON MD         SYSTEM ID:  [id number]   Results for orders placed or performed in visit on 09/07/22   Comprehensive metabolic panel     Status: Abnormal   Result Value Ref Range    Sodium 138 136 - 145 mmol/L    Potassium 4.4 3.4 - 5.3 mmol/L    Creatinine 0.76 0.67 - 1.17 mg/dL    Urea Nitrogen 17.3 6.0 - 20.0 mg/dL    Chloride 104 98 - 107 mmol/L    Carbon Dioxide (CO2) 23 22 - 29 mmol/L    Anion Gap 11 7 - 15 mmol/L    Glucose 113 (H) 70 - 99 mg/dL    Calcium 9.0 8.6 - 10.0 mg/dL    Protein Total 7.5 6.4 - 8.3 g/dL    Albumin 4.3 3.5 - 5.2 g/dL    Bilirubin Total 0.7 <=1.2 mg/dL    Alkaline Phosphatase 89 40 - 129 U/L    AST 31 10 - 50 U/L    ALT 60 (H) 10 - 50 U/L    GFR Estimate >90 >60 mL/min/1.73m2   Lipase     Status: Normal   Result Value Ref Range    Lipase 47 13 - 60 U/L   UA reflex to Microscopic     Status: Normal   Result Value Ref Range    Color Urine Yellow Colorless, Straw, Light Yellow, Yellow    Appearance Urine Clear Clear    Glucose Urine Negative Negative mg/dL    Bilirubin Urine Negative Negative    Ketones Urine Negative Negative mg/dL    Specific Gravity Urine 1.025 1.003 - 1.035    Blood Urine Negative Negative    pH  Urine 5.5 5.0 - 7.0    Protein Albumin Urine Negative Negative mg/dL    Urobilinogen Urine 0.2 0.2, 1.0 E.U./dL    Nitrite Urine Negative Negative    Leukocyte Esterase Urine Negative Negative    Narrative    Microscopic not indicated   CBC with platelets and differential     Status: Abnormal   Result Value Ref Range    WBC Count 9.1 4.0 - 11.0 10e3/uL    RBC Count 5.74 4.40 - 5.90 10e6/uL    Hemoglobin 15.5 13.3 - 17.7 g/dL    Hematocrit 49.5 40.0 - 53.0 %    MCV 86 78 - 100 fL    MCH 27.0 26.5 - 33.0 pg    MCHC 31.3 (L) 31.5 - 36.5 g/dL    RDW 13.5 10.0 - 15.0 %    Platelet Count 366 150 - 450 10e3/uL    % Neutrophils 62 %    % Lymphocytes 25 %    % Monocytes 9 %    % Eosinophils 3 %    % Basophils 0 %    Absolute Neutrophils 5.7 1.6 - 8.3 10e3/uL    Absolute Lymphocytes 2.3 0.8 - 5.3 10e3/uL    Absolute Monocytes 0.9 0.0 - 1.3 10e3/uL    Absolute Eosinophils 0.3 0.0 - 0.7 10e3/uL    Absolute Basophils 0.0 0.0 - 0.2 10e3/uL   CBC with Platelets & Differential     Status: Abnormal    Narrative    The following orders were created for panel order CBC with Platelets & Differential.  Procedure                               Abnormality         Status                     ---------                               -----------         ------                     CBC with platelets and d...[[id number]]  Abnormal            Final result                 Please view results for these tests on the individual orders.

## 2022-09-07 NOTE — PATIENT INSTRUCTIONS
Continue omeprazole.  Be aware of food triggers that increases acid reflux symptoms and avoid them.    You will be contacted in 1-2 days for results of your lab tests.     Reduce alcohol consumption to less than 2 drinks per day.    Xray of heel today - result in 24 hour. Likely referral to podiatry depending on findings.    Acetaminophen 500 mg orally 1-2 tablets every 4-6 hrs as needed for pain .  Avoid naproxen or ibuprofen as these can increase acid reflux.    Schedule follow up with the orthopedics provider who you saw at [facility] in [place] for the recurrent elbow pain.

## 2022-09-19 ENCOUNTER — TRANSFERRED RECORDS (OUTPATIENT)
Dept: HEALTH INFORMATION MANAGEMENT | Facility: CLINIC | Age: 47
End: 2022-09-19

## 2022-10-07 DIAGNOSIS — K21.9 GASTROESOPHAGEAL REFLUX DISEASE, UNSPECIFIED WHETHER ESOPHAGITIS PRESENT: ICD-10-CM

## 2022-10-07 NOTE — TELEPHONE ENCOUNTER
"Requested Prescriptions   Pending Prescriptions Disp Refills    omeprazole (PRILOSEC) 40 MG DR capsule [Pharmacy Med Name: Omeprazole 40 MG Oral Capsule Delayed Release] 90 capsule 0     Sig: Take 1 capsule by mouth once daily.        PPI Protocol Passed - 10/7/2022  8:09 AM        Passed - Not on Clopidogrel (unless Pantoprazole ordered)        Passed - No diagnosis of osteoporosis on record        Passed - Recent (12 mo) or future (30 days) visit within the authorizing provider's specialty     Patient has had an office visit with the authorizing provider or a provider within the authorizing providers department within the previous 12 mos or has a future within next 30 days. See \"Patient Info\" tab in inbasket, or \"Choose Columns\" in Meds & Orders section of the refill encounter.              Passed - Medication is active on med list        Passed - Patient is age 18 or older              "

## 2022-10-10 RX ORDER — OMEPRAZOLE 40 MG/1
CAPSULE, DELAYED RELEASE ORAL
Qty: 90 CAPSULE | Refills: 0 | Status: SHIPPED | OUTPATIENT
Start: 2022-10-10 | End: 2022-11-29

## 2022-11-02 NOTE — TELEPHONE ENCOUNTER
Form completed, signed, and mailed to patient's home address. Copy of form sent to scan and copy placed in cabinet.

## 2022-11-19 ENCOUNTER — HEALTH MAINTENANCE LETTER (OUTPATIENT)
Age: 47
End: 2022-11-19

## 2022-11-29 ENCOUNTER — VIRTUAL VISIT (OUTPATIENT)
Dept: FAMILY MEDICINE | Facility: CLINIC | Age: 47
End: 2022-11-29
Payer: COMMERCIAL

## 2022-11-29 ENCOUNTER — TELEPHONE (OUTPATIENT)
Dept: GASTROENTEROLOGY | Facility: CLINIC | Age: 47
End: 2022-11-29

## 2022-11-29 DIAGNOSIS — R10.9 LEFT SIDED ABDOMINAL PAIN: ICD-10-CM

## 2022-11-29 DIAGNOSIS — Z12.11 SCREEN FOR COLON CANCER: ICD-10-CM

## 2022-11-29 DIAGNOSIS — E78.2 MIXED HYPERLIPIDEMIA: ICD-10-CM

## 2022-11-29 DIAGNOSIS — K21.9 GASTROESOPHAGEAL REFLUX DISEASE, UNSPECIFIED WHETHER ESOPHAGITIS PRESENT: Primary | ICD-10-CM

## 2022-11-29 PROCEDURE — 99214 OFFICE O/P EST MOD 30 MIN: CPT | Mod: TEL | Performed by: FAMILY MEDICINE

## 2022-11-29 RX ORDER — OMEPRAZOLE 40 MG/1
CAPSULE, DELAYED RELEASE ORAL
Qty: 90 CAPSULE | Refills: 0 | Status: SHIPPED | OUTPATIENT
Start: 2022-11-29 | End: 2023-03-14

## 2022-11-29 NOTE — PROGRESS NOTES
Frank is a 47 year old who is being evaluated via a billable telephone visit.      What phone number would you like to be contacted at? [phone]  How would you like to obtain your AVS? MyChart    Assessment & Plan     Gastroesophageal reflux disease, unspecified whether esophagitis present  Still recurrent symptoms in spite of PPI use.  Reviewed with patient his previous EGD several yrs ago. Offered repeat endoscopy due to finding of gastric polyp then. He concurred.  Continue PPI for now.  Advised to avoid triggers to symptoms.  - omeprazole (PRILOSEC) 40 MG DR capsule  Dispense: 90 capsule; Refill: 0  - Adult GI  Referral - Procedure Only    Left sided abdominal pain  See above.  Patient denies severe pain though.  - Adult GI  Referral - Procedure Only    Screen for colon cancer  Patient has order for colonoscopy plced in September. Patient was given scheduling phone number.  He said he will schedule together with EGD.    Mixed hyperlipidemia  Reinforced heart healthy lifestyle.  - Lipid panel reflex to direct LDL Fasting      Patient Instructions   Call [phone] to schedule your upper endoscopy and colonoscopy procedures.    Continue omeprazole 40 mg daily.    Avoid known triggers to the abdominal pain.  Do not skip meals.  Minimize alcohol if you do consume it.    Schedule fasting lab only appointment to measure your cholesterol levels again.    Be consistent with low trans fat and saturated fat diet.  Eat food rich in omega-3-fatty acids as you tolerate. (salmon, olive oil)  Eat 5 cups of vegetables, fruits and whole grains per day.  Limit starchy food (white rice, white bread, white pasta, white potatoes) to less than a cup per meal.  Minimize sweets, junk food and fastfood. Limit soda beverages to one serving per day; best to avoid it altogether though.  Exercise: moderate intensity sustained for at least 30 mins per episode, goal of 150 mins per week at least  Combine cardiovascular  and resistance exercises.  These exercise recommendations are in addition to your daily activity at work or home.  Work on losing weight if you are above your goal body mass index.     Review your health maintenance list in [facility] and schedule your wellness exam at your soonest convenience to complete outstanding preventive health measures.        Return in about 1 month (around 12/29/2022) for In-clinic visit for wellness exam.    Corby Thornton MD  [facility] Marie is a 47 year old, presenting for the following health issues:  Refill Request (Pt being seen for a refill on omeprazole.)      History of Present Illness       Reason for visit:  Refill on omeprazole    He eats 2-3 servings of fruits and vegetables daily.He consumes 0 sweetened beverage(s) daily.He exercises with enough effort to increase his heart rate 9 or less minutes per day.  He exercises with enough effort to increase his heart rate 3 or less days per week.   He is taking medications regularly.       Medication Followup of omeprazole - given for GERD and Left abdominal pain.    Taking Medication as prescribed: yes    Side Effects:  None    Medication Helping Symptoms:  Yes, but still recurrent left upper and side abdominal pain.    Triggers may include caffeine.    Denies heavy alcohol use.    Has not scheduled colonoscopy - ordered in September 2022.    Patient also has had high cholesterol in previous labs. He is due for repeat lab now.  He agrees to order lab test.  Patient is not on statin.      Review of Systems   Constitutional, HEENT, cardiovascular, pulmonary, GI, , musculoskeletal, neuro, skin, endocrine and psych systems are negative, except as otherwise noted.      Objective    Vitals - Patient Reported  Pain Score: Moderate Pain (4)  Pain Loc: Abdomen        Physical Exam   alert and no distress  PSYCH: Alert and oriented times 3; coherent speech, normal   rate and volume, able to articulate  logical thoughts, able   to abstract reason, no tangential thoughts, no hallucinations   or delusions  His affect is normal  RESP: No cough, no audible wheezing, able to talk in full sentences  Remainder of exam unable to be completed due to telephone visits    No results found for any visits on 11/29/22.        Phone call duration: 9 minutes

## 2022-11-29 NOTE — PATIENT INSTRUCTIONS
Call [phone] to schedule your upper endoscopy and colonoscopy procedures.    Continue omeprazole 40 mg daily.    Avoid known triggers to the abdominal pain.  Do not skip meals.  Minimize alcohol if you do consume it.    Schedule fasting lab only appointment to measure your cholesterol levels again.    Be consistent with low trans fat and saturated fat diet.  Eat food rich in omega-3-fatty acids as you tolerate. (salmon, olive oil)  Eat 5 cups of vegetables, fruits and whole grains per day.  Limit starchy food (white rice, white bread, white pasta, white potatoes) to less than a cup per meal.  Minimize sweets, junk food and fastfood. Limit soda beverages to one serving per day; best to avoid it altogether though.  Exercise: moderate intensity sustained for at least 30 mins per episode, goal of 150 mins per week at least  Combine cardiovascular and resistance exercises.  These exercise recommendations are in addition to your daily activity at work or home.  Work on losing weight if you are above your goal body mass index.     Review [facility] and schedule your wellness exam at your soonest convenience to complete outstanding preventive health measures.

## 2022-11-29 NOTE — TELEPHONE ENCOUNTER
Screening Questions  BLUE  KIND OF PREP RED  LOCATION [review exclusion criteria] GREEN  SEDATION TYPE        Yes Are you active on mychart?       [place] Ordering/Referring Provider?        [facility] What type of coverage do you have?      No Have you had a positive covid test in the last 14 days?     No 1. BMI  [BMI 40+ - review exclusion criteria]    Yes  2. Are you able to give consent for your medical care? [IF NO,RN REVIEW]        No  3. Are you taking any prescription pain medications on a routine schedule?      NA  3a. EXTENDED PREP What kind of prescription?     No 4. Do you have any chemical dependencies such as alcohol, street drugs, or methadone?    No 5. Do you have any history of post-traumatic stress syndrome, severe anxiety or history of psychosis?      **If yes 3- 5 , please schedule with MAC sedation.**          IF YES TO ANY 6 - 10 - HOSPITAL SETTING ONLY.     No 6.   Do you need assistance transferring?     No 7.   Have you had a heart or lung transplant?    No 8.   Are you currently on dialysis?   No 9.   Do you use daily home oxygen?   No 10. Do you take nitroglycerin?   10a. NA If yes, how often?     11. [FEMALES]  NA Are you currently pregnant?    11a. NA If yes, how many weeks? [ Greater than 12 weeks, OR NEEDED]    No 12. Do you have Pulmonary Hypertension? *NEED PAC APPT AT [facility]*     No 13. [review exclusion criteria]  Do you have any implantable devices in your body (pacemaker, defib, LVAD)?    No 14. In the past 6 months, have you had any heart related issues including cardiomyopathy or heart attack?     14a. NA If yes, did it require cardiac stenting if so when?     No 15. Have you had a stroke or Transient ischemic attack (TIA - aka  mini stroke ) within 6 months?      No 16. Do you have mod to severe Obstructive Sleep Apnea?  [Hospital only - Ok at [facility]]    No 17. Do you have SEVERE AND UNCONTROLLED asthma? *NEED PAC APPT AT [facility]*     No 18. Are you currently taking any  "blood thinners?     18a. If yes, inform patient to \"follow up w/ ordering provider for bridging instructions.\"    No 19. Do you take the medication Phentermine?    19a. If yes, \"Hold for 7 days before procedure.  Please consult your prescribing provider if you have questions about holding this medication.\"     No  20. Do you have chronic kidney disease?      No  21. Do you have a diagnosis of diabetes?     No  22. On a regular basis do you go 3-5 days between bowel movements?     See below 23. Preferred LOCAL Pharmacy for Pre Prescription    [ LIST ONLY ONE PHARMACY]     [facility] [address]        - CLOSING REMINDERS -    Informed patient they will need an adult    Cannot take any type of public or medical transportation alone    Conscious Sedation- Needs  for 6 hours after the procedure       MAC/General-Needs  for 24 hours after procedure    Pre-Procedure Covid test to be completed [[facility] PCR Testing Required]    Confirmed Nurse will call to complete assessment       - SCHEDULING DETAILS -     Clancy  Surgeon    1-16-23  Date of Procedure  Upper and Lower Endoscopy [EGD and Colonoscopy]  Type of Procedure Scheduled  [facility]-If you answer yes to questions #8, #20, #21Which Colonoscopy Prep was Sent?     GEN Sedation Type     NA PAC / Pre-op Required         Additional comments:  NA          "

## 2022-12-30 ENCOUNTER — TELEPHONE (OUTPATIENT)
Dept: FAMILY MEDICINE | Facility: CLINIC | Age: 47
End: 2022-12-30

## 2022-12-30 NOTE — TELEPHONE ENCOUNTER
Reason for Call:  Medication or medication refill:    Do you use a [facility]?  Name of the pharmacy and phone number for the current request:  [facility]    Name of the medication requested: omeprazole (PRILOSEC) 40 MG DR capsule    Other request: currently out needing right away    Can we leave a detailed message on this number? YES    Phone number patient can be reached at: Cell number on file:    Telephone Information:   Mobile [phone]       Best Time: anytime    Call taken on 12/30/2022 at 9:27 AM by Nisa Morejon

## 2023-01-11 RX ORDER — BISACODYL 5 MG
TABLET, DELAYED RELEASE (ENTERIC COATED) ORAL
Qty: 4 TABLET | Refills: 0 | Status: SHIPPED | OUTPATIENT
Start: 2023-01-11

## 2023-01-13 ENCOUNTER — ANESTHESIA EVENT (OUTPATIENT)
Dept: GASTROENTEROLOGY | Facility: CLINIC | Age: 48
End: 2023-01-13
Payer: COMMERCIAL

## 2023-01-13 RX ORDER — ONDANSETRON 4 MG/1
4 TABLET, ORALLY DISINTEGRATING ORAL EVERY 30 MIN PRN
Status: CANCELLED | OUTPATIENT
Start: 2023-01-13

## 2023-01-13 RX ORDER — ONDANSETRON 2 MG/ML
4 INJECTION INTRAMUSCULAR; INTRAVENOUS EVERY 30 MIN PRN
Status: CANCELLED | OUTPATIENT
Start: 2023-01-13

## 2023-01-13 RX ORDER — SODIUM CHLORIDE, SODIUM LACTATE, POTASSIUM CHLORIDE, CALCIUM CHLORIDE 600; 310; 30; 20 MG/100ML; MG/100ML; MG/100ML; MG/100ML
INJECTION, SOLUTION INTRAVENOUS CONTINUOUS
Status: CANCELLED | OUTPATIENT
Start: 2023-01-13

## 2023-01-13 NOTE — ANESTHESIA PREPROCEDURE EVALUATION
Anesthesia Pre-Procedure Evaluation    Patient: Daniele Carter   MRN: [id number] : 1975        Procedure : Procedure(s):  COLONOSCOPY  ESOPHAGOGASTRODUODENOSCOPY (EGD)          Past Medical History:   Diagnosis Date     Esophageal reflux      Hyperlipidemia      Pain in joint, lower leg       Past Surgical History:   Procedure Laterality Date     COLONOSCOPY  2011    Procedure:COLONOSCOPY; Surgeon:JONAS JAIN; Location:[facility]     ESOPHAGOSCOPY, GASTROSCOPY, DUODENOSCOPY (EGD), COMBINED N/A 3/28/2017    Procedure: COMBINED ESOPHAGOSCOPY, GASTROSCOPY, DUODENOSCOPY (EGD), BIOPSY SINGLE OR MULTIPLE;  Surgeon: Nikolai Valladares MD;  Location: [facility]     INCISION AND DRAINAGE UPPER EXTREMITY, COMBINED Right 2021    Procedure: RIGHT ELBOW IRRIGATION & DEBRIDEMENT OLECRENON BURSITIS;  Surgeon: Chidi Chi MD;  Location: [facility]      Allergies   Allergen Reactions     Bee Anaphylaxis     Asa [Aspirin]      Unknown reaction- Hives???     Chocolate Flavor Hives      Social History     Tobacco Use     Smoking status: Never     Smokeless tobacco: Never   Substance Use Topics     Alcohol use: Yes     Comment: weekends      Wt Readings from Last 1 Encounters:   22 84.3 kg (185 lb 12.8 oz)        Anesthesia Evaluation            ROS/MED HX  ENT/Pulmonary:     (+) Mild Persistent, asthma     Neurologic:       Cardiovascular:     (+) Dyslipidemia -----    METS/Exercise Tolerance:     Hematologic:       Musculoskeletal:       GI/Hepatic:     (+) GERD,     Renal/Genitourinary:       Endo:       Psychiatric/Substance Use:       Infectious Disease:       Malignancy:       Other:            Physical Exam    Airway  airway exam normal      Mallampati: II   TM distance: > 3 FB   Neck ROM: full   Mouth opening: > 3 cm    Respiratory Devices and Support         Dental       (+) Minor Abnormalities - some fillings, tiny chips      Cardiovascular   cardiovascular exam normal          Pulmonary    pulmonary exam normal                OUTSIDE LABS:  CBC:   Lab Results   Component Value Date    WBC 9.1 09/07/2022    WBC 12.4 (H) 09/19/2021    HGB 15.5 09/07/2022    HGB 13.3 09/20/2021    HCT 49.5 09/07/2022    HCT 40.0 09/19/2021     09/07/2022     09/19/2021     BMP:   Lab Results   Component Value Date     09/07/2022     09/17/2021    POTASSIUM 4.4 09/07/2022    POTASSIUM 3.9 09/19/2021    CHLORIDE 104 09/07/2022    CHLORIDE 106 09/17/2021    CO2 23 09/07/2022    CO2 26 09/17/2021    BUN 17.3 09/07/2022    BUN 13 09/17/2021    CR 0.76 09/07/2022    CR 0.72 09/17/2021     (H) 09/07/2022    GLC 99 09/17/2021     COAGS: No results found for: PTT, INR, FIBR  POC: No results found for: BGM, HCG, HCGS  HEPATIC:   Lab Results   Component Value Date    ALBUMIN 4.3 09/07/2022    PROTTOTAL 7.5 09/07/2022    ALT 60 (H) 09/07/2022    AST 31 09/07/2022    ALKPHOS 89 09/07/2022    BILITOTAL 0.7 09/07/2022     OTHER:   Lab Results   Component Value Date    LACT 0.9 09/18/2021    PAULIE 9.0 09/07/2022    LIPASE 47 09/07/2022    TSH 0.44 12/20/2017    T4 1.16 02/13/2012    CRP 98.2 (H) 09/18/2021    SED 7 05/09/2012       Anesthesia Plan    ASA Status:  2   NPO Status:  NPO Appropriate    Anesthesia Type: General.   Induction: Propofol, Intravenous.   Maintenance: TIVA.        Consents    Anesthesia Plan(s) and associated risks, benefits, and realistic alternatives discussed. Questions answered and patient/representative(s) expressed understanding.     - Discussed: Risks, Benefits and Alternatives for BOTH SEDATION and the PROCEDURE were discussed     - Discussed with:  Patient         Postoperative Care    Pain management: Multi-modal analgesia, IV analgesics, Oral pain medications.   PONV prophylaxis: Ondansetron (or other 5HT-3), Dexamethasone or Solumedrol, Background Propofol Infusion     Comments:                Bennie Zaldivar CRNA, APRN CRNA

## 2023-01-16 ENCOUNTER — ANESTHESIA (OUTPATIENT)
Dept: GASTROENTEROLOGY | Facility: CLINIC | Age: 48
End: 2023-01-16
Payer: COMMERCIAL

## 2023-02-15 RX ORDER — SODIUM CHLORIDE, SODIUM LACTATE, POTASSIUM CHLORIDE, CALCIUM CHLORIDE 600; 310; 30; 20 MG/100ML; MG/100ML; MG/100ML; MG/100ML
INJECTION, SOLUTION INTRAVENOUS CONTINUOUS
Status: CANCELLED | OUTPATIENT
Start: 2023-02-15

## 2023-02-16 ENCOUNTER — HOSPITAL ENCOUNTER (OUTPATIENT)
Facility: CLINIC | Age: 48
Discharge: HOME | End: 2023-02-16
Attending: SURGERY | Admitting: SURGERY
Payer: COMMERCIAL

## 2023-02-16 VITALS
WEIGHT: 185.8 LBS | DIASTOLIC BLOOD PRESSURE: 88 MMHG | OXYGEN SATURATION: 98 % | RESPIRATION RATE: 16 BRPM | TEMPERATURE: 98.6 F | SYSTOLIC BLOOD PRESSURE: 123 MMHG | BODY MASS INDEX: 28.16 KG/M2 | HEART RATE: 77 BPM | HEIGHT: 68 IN

## 2023-02-16 DIAGNOSIS — Z12.11 SPECIAL SCREENING FOR MALIGNANT NEOPLASMS, COLON: Primary | ICD-10-CM

## 2023-02-16 LAB
COLONOSCOPY: NORMAL
UPPER GI ENDOSCOPY: NORMAL

## 2023-02-16 PROCEDURE — 250N000009 HC RX 250: Performed by: NURSE ANESTHETIST, CERTIFIED REGISTERED

## 2023-02-16 PROCEDURE — 258N000003 HC RX IP 258 OP 636: Performed by: SURGERY

## 2023-02-16 PROCEDURE — 43235 EGD DIAGNOSTIC BRUSH WASH: CPT | Mod: 51 | Performed by: SURGERY

## 2023-02-16 PROCEDURE — 250N000009 HC RX 250: Performed by: SURGERY

## 2023-02-16 PROCEDURE — G0121 COLON CA SCRN NOT HI RSK IND: HCPCS | Performed by: SURGERY

## 2023-02-16 PROCEDURE — 45378 DIAGNOSTIC COLONOSCOPY: CPT | Performed by: SURGERY

## 2023-02-16 PROCEDURE — 43235 EGD DIAGNOSTIC BRUSH WASH: CPT | Performed by: SURGERY

## 2023-02-16 PROCEDURE — 370N000017 HC ANESTHESIA TECHNICAL FEE, PER MIN: Performed by: SURGERY

## 2023-02-16 PROCEDURE — 250N000011 HC RX IP 250 OP 636: Performed by: NURSE ANESTHETIST, CERTIFIED REGISTERED

## 2023-02-16 RX ORDER — LIDOCAINE 40 MG/G
CREAM TOPICAL
Status: DISCONTINUED | OUTPATIENT
Start: 2023-02-16 | End: 2023-02-16 | Stop reason: HOSPADM

## 2023-02-16 RX ORDER — PROPOFOL 10 MG/ML
INJECTION, EMULSION INTRAVENOUS PRN
Status: DISCONTINUED | OUTPATIENT
Start: 2023-02-16 | End: 2023-02-16

## 2023-02-16 RX ORDER — SODIUM CHLORIDE, SODIUM LACTATE, POTASSIUM CHLORIDE, CALCIUM CHLORIDE 600; 310; 30; 20 MG/100ML; MG/100ML; MG/100ML; MG/100ML
INJECTION, SOLUTION INTRAVENOUS CONTINUOUS
Status: DISCONTINUED | OUTPATIENT
Start: 2023-02-16 | End: 2023-02-16 | Stop reason: HOSPADM

## 2023-02-16 RX ORDER — PROPOFOL 10 MG/ML
INJECTION, EMULSION INTRAVENOUS CONTINUOUS PRN
Status: DISCONTINUED | OUTPATIENT
Start: 2023-02-16 | End: 2023-02-16

## 2023-02-16 RX ORDER — GLYCOPYRROLATE 0.2 MG/ML
INJECTION, SOLUTION INTRAMUSCULAR; INTRAVENOUS PRN
Status: DISCONTINUED | OUTPATIENT
Start: 2023-02-16 | End: 2023-02-16

## 2023-02-16 RX ORDER — LIDOCAINE HYDROCHLORIDE 10 MG/ML
INJECTION, SOLUTION EPIDURAL; INFILTRATION; INTRACAUDAL; PERINEURAL PRN
Status: DISCONTINUED | OUTPATIENT
Start: 2023-02-16 | End: 2023-02-16

## 2023-02-16 RX ADMIN — LIDOCAINE HYDROCHLORIDE 0.1 ML: 10 INJECTION, SOLUTION EPIDURAL; INFILTRATION; INTRACAUDAL; PERINEURAL at 15:07

## 2023-02-16 RX ADMIN — PROPOFOL 200 MCG/KG/MIN: 10 INJECTION, EMULSION INTRAVENOUS at 17:37

## 2023-02-16 RX ADMIN — SODIUM CHLORIDE, POTASSIUM CHLORIDE, SODIUM LACTATE AND CALCIUM CHLORIDE: 600; 310; 30; 20 INJECTION, SOLUTION INTRAVENOUS at 15:06

## 2023-02-16 RX ADMIN — PROPOFOL 100 MG: 10 INJECTION, EMULSION INTRAVENOUS at 17:37

## 2023-02-16 RX ADMIN — LIDOCAINE HYDROCHLORIDE 100 MG: 10 INJECTION, SOLUTION EPIDURAL; INFILTRATION; INTRACAUDAL; PERINEURAL at 17:37

## 2023-02-16 RX ADMIN — GLYCOPYRROLATE 0.1 MG: 0.2 INJECTION, SOLUTION INTRAMUSCULAR; INTRAVENOUS at 17:35

## 2023-02-16 RX ADMIN — TOPICAL ANESTHETIC 2 SPRAY: 200 SPRAY DENTAL; PERIODONTAL at 17:35

## 2023-02-16 ASSESSMENT — ACTIVITIES OF DAILY LIVING (ADL)
ADLS_ACUITY_SCORE: 35
ADLS_ACUITY_SCORE: 35

## 2023-02-16 NOTE — ANESTHESIA CARE TRANSFER NOTE
Patient: Daniele Carter    Procedure: Procedure(s):  COLONOSCOPY  ESOPHAGOGASTRODUODENOSCOPY (EGD)       Diagnosis: Screen for colon cancer [Z12.11]  Gastroesophageal reflux disease, unspecified whether esophagitis present [K21.9]  Diagnosis Additional Information: No value filed.    Anesthesia Type:   General     Note:    Oropharynx: oropharynx clear of all foreign objects and spontaneously breathing  Level of Consciousness: awake  Oxygen Supplementation: room air    Independent Airway: airway patency satisfactory and stable  Dentition: dentition unchanged  Vital Signs Stable: post-procedure vital signs reviewed and stable  Report to RN Given: handoff report given  Patient transferred to: Phase II    Handoff Report: Identifed the Patient, Identified the Reponsible Provider, Reviewed the pertinent medical history, Discussed the surgical course, Reviewed Intra-OP anesthesia mangement and issues during anesthesia, Set expectations for post-procedure period and Allowed opportunity for questions and acknowledgement of understanding      Vitals:  Vitals Value Taken Time   BP     Temp     Pulse     Resp     SpO2         Electronically Signed By: ISSA Morrison CRNA  February 16, 2023  5:58 PM

## 2023-02-16 NOTE — H&P
[facility]    Pre-Endoscopy History and Physical     Daniele Carter MRN# [id number]   YOB: 1975 Age: 47 year old     Date of Procedure: 2/16/2023  Primary care provider: Corby Thornton  Type of Endoscopy: Colonoscopy with possible biopsy, possible polypectomy and Esophagogastroduodenoscopy with possible biopsy, possible dilation, possible foreign body removal  Reason for Procedure: screening and reflux  Type of Anesthesia Anticipated: MAC    HPI:    Daniele is a 47 year old male who will be undergoing the above procedure.  1st colon 2011; on omeprazole 40mg; last EGD 2017 - nl; no blood thinner; no famhx of colon cancer    A history and physical has been performed. The patient's medications and allergies have been reviewed. The risks and benefits of the procedure and the sedation options and risks were discussed with the patient.  All questions were answered and informed consent was obtained.      He denies a personal or family history of anesthesia complications or bleeding disorders.     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     CARDIOVASCULAR SCREENING; LDL GOAL LESS THAN 160     Mild persistent asthma     Septic olecranon bursitis of right elbow     Septic bursitis of elbow, right        Past Medical History:   Diagnosis Date     Esophageal reflux      Hyperlipidemia      Pain in joint, lower leg         Past Surgical History:   Procedure Laterality Date     COLONOSCOPY  5/6/2011    Procedure:COLONOSCOPY; Surgeon:JONAS JAIN; Location:[facility]     ESOPHAGOSCOPY, GASTROSCOPY, DUODENOSCOPY (EGD), COMBINED N/A 3/28/2017    Procedure: COMBINED ESOPHAGOSCOPY, GASTROSCOPY, DUODENOSCOPY (EGD), BIOPSY SINGLE OR MULTIPLE;  Surgeon: Nikolai Valladares MD;  Location: [facility]     INCISION AND DRAINAGE UPPER EXTREMITY, COMBINED Right 9/19/2021    Procedure: RIGHT ELBOW IRRIGATION &  DEBRIDEMENT OLECRENON BURSITIS;  Surgeon: Chidi Chi MD;  Location: [facility]       Social History     Tobacco Use     Smoking status: Never     Smokeless tobacco: Never   Substance Use Topics     Alcohol use: Yes     Comment: weekends       Family History   Problem Relation Age of Onset     Lipids Mother      Alzheimer Disease Mother      Cancer Father         lymphoma; Sprayed with [employer] in [place]     Diabetes Father         type 2     Heart Disease Father      No Known Problems Brother 34        history of  heart problems     Diabetes Maternal Grandfather        Prior to Admission medications    Medication Sig Start Date End Date Taking? Authorizing Provider   albuterol (PROAIR HFA/PROVENTIL HFA/VENTOLIN HFA) 108 (90 Base) MCG/ACT inhaler Inhale 2 puffs into the lungs every 6 hours This is your rescue inhaler. 12/14/20  Yes Elmer Horn MD   bisacodyl (DULCOLAX) 5 MG EC tablet Take 2 tablets at 3 pm the day before your procedure. If your procedure is before 11 am, take 2 additional tablets at 11 pm. If your procedure is after 11 am, take 2 additional tablets at 6 am. For additional instructions refer to your colonoscopy prep instructions. 1/11/23  Yes Marshall Clancy MD   cyclobenzaprine (FLEXERIL) 10 MG tablet Take 1 tablet (10 mg) by mouth 3 times daily as needed for muscle spasms 11/18/21  Yes Lauren Valdez PA-C   fluticasone (ARNUITY ELLIPTA) 200 MCG/ACT inhaler Inhale 1 puff into the lungs daily 8/31/21  Yes Nishant Singh MD   omeprazole (PRILOSEC) 40 MG DR capsule Take 1 capsule by mouth once daily. Reestablish care with new primary care provider. 11/29/22  Yes Corby Thornton MD   polyethylene glycol (GOLYTELY) 236 g suspension The night before the exam at 6 pm drink an 8-ounce glass every 15 minutes until the jug is half empty. If you arrive before 11 AM: Drink the other half of the Golytely jug at 11 PM night before procedure. If you arrive after 11 AM: Drink the  "other half of the GolCtraxly jug at 6 AM day of procedure. For additional instructions refer to your colonoscopy prep instructions. 1/11/23  Yes Marshall Clancy MD   EPINEPHrine (ANY BX GENERIC EQUIV) 0.3 MG/0.3ML injection 2-pack Inject 0.3 mLs (0.3 mg) into the muscle once as needed for anaphylaxis 8/31/21   Nishant Singh MD       Allergies   Allergen Reactions     Bee Anaphylaxis     Asa [Aspirin]      Unknown reaction- Hives???     Chocolate Flavor Hives        REVIEW OF SYSTEMS:   5 point ROS negative except as noted above in HPI, including Gen., Resp., CV, GI &  system review.    PHYSICAL EXAM:   BP (!) 138/91 (BP Location: Right arm)   Pulse 89   Temp 98.6  F (37  C) (Oral)   Resp 16   Ht 1.727 m (5' 8\")   Wt 84.3 kg (185 lb 12.8 oz)   SpO2 96%   BMI 28.25 kg/m   Estimated body mass index is 28.25 kg/m  as calculated from the following:    Height as of this encounter: 1.727 m (5' 8\").    Weight as of this encounter: 84.3 kg (185 lb 12.8 oz).   Constitutional: Awake, alert, no acute distress.  Eyes: No scleral icterus.  Conjunctiva are without injection.  ENMT: Mucous membranes moist, dentition and gums are intact.   Neck: Soft, supple, trachea midline.    Endocrine: n/a   Lymphatic: There is no cervical, submandibularadenopathy.  Respiratory: normal effortgs   Cardiovascular: S1, S2  Abdomen: Non-distended, non-tender,  No masses,  Musculoskeletal: No spinal or CVA tenderness. Full range of motion in the upper and lower extremities.    Skin: No skin rashes or lesions to inspection.  No petechia.    Neurologic: alerted and oriented 3x  Psychiatric: The patient's affect is not blunted and mood is appropriate.  DIAGNOSTICS:    Not indicated    IMPRESSION   ASA Class 2 - Mild systemic disease    PLAN:   Plan for Colonoscopy with possible biopsy, possible polypectomy and Esophagogastroduodenoscopy with possible biopsy, possible dilation, possible foreign body removal. We discussed the risks, benefits and " alternatives and the patient wished to proceed.  Patient is cleared for the above procedure.    The above has been forwarded to the consulting provider.    [facility], [facility]

## 2023-02-16 NOTE — ANESTHESIA POSTPROCEDURE EVALUATION
Patient: Daniele Carter    Procedure: Procedure(s):  COLONOSCOPY  ESOPHAGOGASTRODUODENOSCOPY (EGD)       Anesthesia Type:  General    Note:  Disposition: Outpatient   Postop Pain Control: Uneventful            Sign Out: Well controlled pain   PONV: No   Neuro/Psych: Uneventful            Sign Out: Acceptable/Baseline neuro status   Airway/Respiratory: Uneventful            Sign Out: Acceptable/Baseline resp. status   CV/Hemodynamics: Uneventful            Sign Out: Acceptable CV status; No obvious hypovolemia; No obvious fluid overload   Other NRE: NONE   DID A NON-ROUTINE EVENT OCCUR? No           Last vitals:  Vitals:    02/16/23 1448   BP: (!) 138/91   Pulse: 89   Resp: 16   Temp: 37  C (98.6  F)   SpO2: 96%       Electronically Signed By: ISSA Morrison CRNA  February 16, 2023  5:58 PM

## 2023-03-13 DIAGNOSIS — K21.9 GASTROESOPHAGEAL REFLUX DISEASE, UNSPECIFIED WHETHER ESOPHAGITIS PRESENT: ICD-10-CM

## 2023-03-13 NOTE — TELEPHONE ENCOUNTER
Pending Prescriptions:                       Disp   Refills    omeprazole (PRILOSEC) 40 MG DR capsule     90 cap*0        Sig: Take 1 capsule by mouth once daily. Reestablish care           with new primary care provider.        Routing refill request to provider for review/approval because:  Passes protocol, but virtual visit note from November 2022 indicated patient needed to schedule an in-person wellness visit to get established, and only a 3 month supply Rx sent at that time.    RN sent [employer] message advising patient to schedule appt.       Last Written Prescription Date:  11/29/22  Last Fill Quantity: 90,  # refills: 0   Last in-person office visit: 9/7/2022 with prescribing provider.      Brooklyn Grimm RN

## 2023-03-14 RX ORDER — OMEPRAZOLE 40 MG/1
CAPSULE, DELAYED RELEASE ORAL
Qty: 90 CAPSULE | Refills: 3 | Status: SHIPPED | OUTPATIENT
Start: 2023-03-14 | End: 2023-12-28

## 2023-04-09 ENCOUNTER — HEALTH MAINTENANCE LETTER (OUTPATIENT)
Age: 48
End: 2023-04-09

## 2023-09-15 ENCOUNTER — OFFICE VISIT (OUTPATIENT)
Dept: FAMILY MEDICINE | Facility: CLINIC | Age: 48
End: 2023-09-15
Payer: COMMERCIAL

## 2023-09-15 VITALS
OXYGEN SATURATION: 98 % | DIASTOLIC BLOOD PRESSURE: 82 MMHG | WEIGHT: 194.4 LBS | BODY MASS INDEX: 29.56 KG/M2 | HEART RATE: 99 BPM | RESPIRATION RATE: 16 BRPM | TEMPERATURE: 97.6 F | SYSTOLIC BLOOD PRESSURE: 128 MMHG

## 2023-09-15 DIAGNOSIS — M70.22 OLECRANON BURSITIS OF LEFT ELBOW: Primary | ICD-10-CM

## 2023-09-15 LAB
B BURGDOR IGG+IGM SER QL: 0.24
BASOPHILS # BLD AUTO: 0.1 10E3/UL (ref 0–0.2)
BASOPHILS NFR BLD AUTO: 1 %
CRP SERPL-MCNC: 4.84 MG/L
EOSINOPHIL # BLD AUTO: 0.5 10E3/UL (ref 0–0.7)
EOSINOPHIL NFR BLD AUTO: 6 %
ERYTHROCYTE [DISTWIDTH] IN BLOOD BY AUTOMATED COUNT: 13 % (ref 10–15)
ERYTHROCYTE [SEDIMENTATION RATE] IN BLOOD BY WESTERGREN METHOD: 9 MM/HR (ref 0–15)
HCT VFR BLD AUTO: 46.8 % (ref 40–53)
HGB BLD-MCNC: 15.1 G/DL (ref 13.3–17.7)
IMM GRANULOCYTES # BLD: 0 10E3/UL
IMM GRANULOCYTES NFR BLD: 0 %
LYMPHOCYTES # BLD AUTO: 1.4 10E3/UL (ref 0.8–5.3)
LYMPHOCYTES NFR BLD AUTO: 19 %
MCH RBC QN AUTO: 27.3 PG (ref 26.5–33)
MCHC RBC AUTO-ENTMCNC: 32.3 G/DL (ref 31.5–36.5)
MCV RBC AUTO: 85 FL (ref 78–100)
MONOCYTES # BLD AUTO: 1 10E3/UL (ref 0–1.3)
MONOCYTES NFR BLD AUTO: 14 %
NEUTROPHILS # BLD AUTO: 4.4 10E3/UL (ref 1.6–8.3)
NEUTROPHILS NFR BLD AUTO: 60 %
PLATELET # BLD AUTO: 344 10E3/UL (ref 150–450)
RBC # BLD AUTO: 5.54 10E6/UL (ref 4.4–5.9)
URATE SERPL-MCNC: 6.5 MG/DL (ref 3.4–7)
WBC # BLD AUTO: 7.5 10E3/UL (ref 4–11)

## 2023-09-15 PROCEDURE — 85652 RBC SED RATE AUTOMATED: CPT | Performed by: NURSE PRACTITIONER

## 2023-09-15 PROCEDURE — 86140 C-REACTIVE PROTEIN: CPT | Performed by: NURSE PRACTITIONER

## 2023-09-15 PROCEDURE — 84550 ASSAY OF BLOOD/URIC ACID: CPT | Performed by: NURSE PRACTITIONER

## 2023-09-15 PROCEDURE — 36415 COLL VENOUS BLD VENIPUNCTURE: CPT | Performed by: NURSE PRACTITIONER

## 2023-09-15 PROCEDURE — 99214 OFFICE O/P EST MOD 30 MIN: CPT | Performed by: NURSE PRACTITIONER

## 2023-09-15 PROCEDURE — 86618 LYME DISEASE ANTIBODY: CPT | Performed by: NURSE PRACTITIONER

## 2023-09-15 PROCEDURE — 85025 COMPLETE CBC W/AUTO DIFF WBC: CPT | Performed by: NURSE PRACTITIONER

## 2023-09-15 RX ORDER — INDOMETHACIN 50 MG/1
50 CAPSULE ORAL 2 TIMES DAILY WITH MEALS
Qty: 30 CAPSULE | Refills: 0 | Status: SHIPPED | OUTPATIENT
Start: 2023-09-15 | End: 2023-10-05

## 2023-09-15 ASSESSMENT — ASTHMA QUESTIONNAIRES: ACT_TOTALSCORE: 19

## 2023-09-15 ASSESSMENT — PAIN SCALES - GENERAL: PAINLEVEL: NO PAIN (0)

## 2023-09-15 NOTE — PROGRESS NOTES
Assessment & Plan     Olecranon bursitis of left elbow  -patient has history of septic right elbow bursitis in the past, his labs are normal which is reassuring, uric acid level borderline normal, advised patient that gout can be possible differential but concidering that he does not have pain this would be less likely. Will start Indomethacin. Referred patient to ortho for possible aspiration and further treatment   - CRP, inflammation; Future  - ESR: Erythrocyte sedimentation rate; Future  - CBC with platelets and differential; Future  - Uric acid; Future  - Lyme Disease Total Abs Bld with Reflex to Confirm CLIA; Future  - Orthopedic  Referral; Future  - indomethacin (INDOCIN) 50 MG capsule; Take 1 capsule (50 mg) by mouth 2 times daily (with meals)  - CRP, inflammation  - ESR: Erythrocyte sedimentation rate  - CBC with platelets and differential  - Uric acid  - Lyme Disease Total Abs Bld with Reflex to Confirm CLIA      ISSA Tran CNP  [facility] is a 48 year old, presenting for the following health issues:  Elbow Pain        9/15/2023     9:11 AM   Additional Questions   Roomed by toshia       History of Present Illness       Reason for visit:  Left elbow    He eats 2-3 servings of fruits and vegetables daily.He consumes 0 sweetened beverage(s) daily.He exercises with enough effort to increase his heart rate 10 to 19 minutes per day.  He exercises with enough effort to increase his heart rate 3 or less days per week.   He is taking medications regularly.       Concern - EDEMA   Onset: yesterday   Description: left elbow is swollen   Intensity: mild  Progression of Symptoms:  same  Accompanying Signs & Symptoms: none   Previous history of similar problem: none   Precipitating factors:        Worsened by: none   Alleviating factors:        Improved by: none   Therapies tried and outcome:  none         Review of Systems   Constitutional, HEENT,  cardiovascular, pulmonary, gi and gu systems are negative, except as otherwise noted.      Objective    /82   Pulse 99   Temp 97.6  F (36.4  C) (Tympanic)   Resp 16   Wt 88.2 kg (194 lb 6.4 oz)   SpO2 98%   BMI 29.56 kg/m    Body mass index is 29.56 kg/m .  Physical Exam   GENERAL: healthy, alert and no distress  MS: left elbow effusion, full ROM, elbow joint non-tender  SKIN: no suspicious lesions or rashes  NEURO: Normal strength and tone, mentation intact and speech normal  PSYCH: mentation appears normal, affect normal/bright    Results for orders placed or performed in visit on 09/15/23 (from the past 24 hour(s))   CRP, inflammation   Result Value Ref Range    CRP Inflammation 4.84 <5.00 mg/L   ESR: Erythrocyte sedimentation rate   Result Value Ref Range    Erythrocyte Sedimentation Rate 9 0 - 15 mm/hr   CBC with platelets and differential    Narrative    The following orders were created for panel order CBC with platelets and differential.  Procedure                               Abnormality         Status                     ---------                               -----------         ------                     CBC with platelets and d...[[id number]]                      Final result                 Please view results for these tests on the individual orders.   Uric acid   Result Value Ref Range    Uric Acid 6.5 3.4 - 7.0 mg/dL   CBC with platelets and differential   Result Value Ref Range    WBC Count 7.5 4.0 - 11.0 10e3/uL    RBC Count 5.54 4.40 - 5.90 10e6/uL    Hemoglobin 15.1 13.3 - 17.7 g/dL    Hematocrit 46.8 40.0 - 53.0 %    MCV 85 78 - 100 fL    MCH 27.3 26.5 - 33.0 pg    MCHC 32.3 31.5 - 36.5 g/dL    RDW 13.0 10.0 - 15.0 %    Platelet Count 344 150 - 450 10e3/uL    % Neutrophils 60 %    % Lymphocytes 19 %    % Monocytes 14 %    % Eosinophils 6 %    % Basophils 1 %    % Immature Granulocytes 0 %    Absolute Neutrophils 4.4 1.6 - 8.3 10e3/uL    Absolute Lymphocytes 1.4 0.8 - 5.3 10e3/uL     Absolute Monocytes 1.0 0.0 - 1.3 10e3/uL    Absolute Eosinophils 0.5 0.0 - 0.7 10e3/uL    Absolute Basophils 0.1 0.0 - 0.2 10e3/uL    Absolute Immature Granulocytes 0.0 <=0.4 10e3/uL

## 2023-09-18 ENCOUNTER — TELEPHONE (OUTPATIENT)
Dept: FAMILY MEDICINE | Facility: CLINIC | Age: 48
End: 2023-09-18
Payer: COMMERCIAL

## 2023-09-21 ENCOUNTER — TRANSFERRED RECORDS (OUTPATIENT)
Dept: HEALTH INFORMATION MANAGEMENT | Facility: CLINIC | Age: 48
End: 2023-09-21
Payer: COMMERCIAL

## 2023-09-29 DIAGNOSIS — J45.30 MILD PERSISTENT ASTHMA WITHOUT COMPLICATION: ICD-10-CM

## 2023-09-29 DIAGNOSIS — M70.22 OLECRANON BURSITIS OF LEFT ELBOW: ICD-10-CM

## 2023-09-29 RX ORDER — INDOMETHACIN 50 MG/1
50 CAPSULE ORAL 2 TIMES DAILY WITH MEALS
Qty: 30 CAPSULE | Refills: 0 | Status: CANCELLED | OUTPATIENT
Start: 2023-09-29

## 2023-09-29 NOTE — TELEPHONE ENCOUNTER
"Routing refill request to provider for review/approval because:  Labs not current:  ALT, uric acid, creat    Pending Prescriptions:                       Disp   Refills    indomethacin (INDOCIN) 50 MG capsule      30 cap*0            Sig: Take 1 capsule (50 mg) by mouth 2 times daily           (with meals)      Requested Prescriptions   Pending Prescriptions Disp Refills    indomethacin (INDOCIN) 50 MG capsule 30 capsule 0     Sig: Take 1 capsule (50 mg) by mouth 2 times daily (with meals)       Gout Agents Protocol Failed - 9/29/2023  2:52 PM        Failed - ALT on file in past 12 months     Recent Labs   Lab Test 09/07/22  1046   ALT 60*             Failed - Has Uric Acid on file in past 12 months and value is less than 6     Recent Labs   Lab Test 09/15/23  0941   URIC 6.5     If level is 6mg/dL or greater, ok to refill one time and refer to provider.           Failed - Normal serum creatinine on file in the past 12 months     Recent Labs   Lab Test 09/07/22  1046   CR 0.76       Ok to refill medication if creatinine is low          Passed - CBC on file in past 12 months     Recent Labs   Lab Test 09/15/23  0941   WBC 7.5   RBC 5.54   HGB 15.1   HCT 46.8                    Passed - Recent (12 mo) or future (30 days) visit within the authorizing provider's specialty     Patient has had an office visit with the authorizing provider or a provider within the authorizing providers department within the previous 12 mos or has a future within next 30 days. See \"Patient Info\" tab in inbasket, or \"Choose Columns\" in Meds & Orders section of the refill encounter.              Passed - Medication is active on med list        Passed - Patient is age 18 or older       NSAID Medications Failed - 9/29/2023  2:52 PM        Failed - Normal ALT on file in past 12 months     Recent Labs   Lab Test 09/07/22  1046   ALT 60*             Failed - Normal AST on file in past 12 months     Recent Labs   Lab Test 09/07/22  1046 " "  AST 31             Failed - Normal serum creatinine on file in past 12 months     Recent Labs   Lab Test 09/07/22  1046   CR 0.76       Ok to refill medication if creatinine is low          Passed - Blood pressure under 140/90 in past 12 months     BP Readings from Last 3 Encounters:   09/15/23 128/82   02/16/23 123/88   09/07/22 120/84                 Passed - Recent (12 mo) or future (30 days) visit within the authorizing provider's specialty     Patient has had an office visit with the authorizing provider or a provider within the authorizing providers department within the previous 12 mos or has a future within next 30 days. See \"Patient Info\" tab in inbasket, or \"Choose Columns\" in Meds & Orders section of the refill encounter.              Passed - Patient is age 6-64 years        Passed - Normal CBC on file in past 12 months     Recent Labs   Lab Test 09/15/23  0941   WBC 7.5   RBC 5.54   HGB 15.1   HCT 46.8                    Passed - Medication is active on med list           Caroline Duque RN on 9/29/2023 at 2:56 PM    "

## 2023-09-29 NOTE — TELEPHONE ENCOUNTER
"Routing refill request to provider for review/approval because:  ACT=19  9/15/23    Pending Prescriptions:                       Disp   Refills    fluticasone (ARNUITY ELLIPTA) 200 MCG/ACT*30 each3            Sig: Inhale 1 puff into the lungs daily      Requested Prescriptions   Pending Prescriptions Disp Refills    fluticasone (ARNUITY ELLIPTA) 200 MCG/ACT inhaler 30 each 3     Sig: Inhale 1 puff into the lungs daily       Inhaled Steroids Protocol Failed - 9/29/2023  2:55 PM        Failed - Asthma control assessment score within normal limits in last 6 months     Please review ACT score.           Failed - Recent (6 mo) or future (30 days) visit within the authorizing provider's specialty     Patient had office visit in the last 6 months or has a visit in the next 30 days with authorizing provider or within the authorizing provider's specialty.  See \"Patient Info\" tab in inbasket, or \"Choose Columns\" in Meds & Orders section of the refill encounter.            Passed - Patient is age 12 or older        Passed - Medication is active on med list             Caroline Duque RN on 9/29/2023 at 2:54 PM    "

## 2023-09-29 NOTE — TELEPHONE ENCOUNTER
"  Medication Question or Refill        What medication are you calling about (include dose and sig)?: indomethacin (INDOCIN) 50 MG capsule, fluticasone (ARNUITY ELLIPTA) 200 MCG/ACT inhaler      Preferred Pharmacy:   [facility] [address] [place] [address] [address] [facility]  [address] [facility] [address]  Phone: [phone] Fax: [phone]      Controlled Substance Agreement on file:   CSA -- Patient Level:    CSA: None found at the patient level.       Who prescribed the medication?: Francisco Roy    Do you need a refill? Yes    When did you use the medication last? 9/29    Patient offered an appointment? No    Do you have any questions or concerns?  Yes: Pt stated he had his ortho appt and Dr stated he was not going to [facility] and told pt to keep taking the medication pt is on. Pt is unsure for how long but is requesting a refill for indomethacin. Pt is also using his fluticasone \"a lot lately because it's been tough with allergies\" requesting a refill.      Could we send this information to you in [facility] or would you prefer to receive a phone call?:   Patient would prefer a phone call   Okay to leave a detailed message?: Yes at Home number on file [phone] (home)    "

## 2023-10-01 NOTE — TELEPHONE ENCOUNTER
Declined. I have not seen the patient in more than 1 year. He either needs a follow-up appointment or can request further refills from PCP.    Nishant Singh MD

## 2023-10-04 NOTE — TELEPHONE ENCOUNTER
Pt has viewed denial.     Last read by Frank Carter at  8:18 AM on 10/4/2023.       Indy HIGHTOWER RN  Specialty/Allergy Clinics

## 2023-10-05 ENCOUNTER — TELEPHONE (OUTPATIENT)
Dept: FAMILY MEDICINE | Facility: CLINIC | Age: 48
End: 2023-10-05

## 2023-10-05 ENCOUNTER — OFFICE VISIT (OUTPATIENT)
Dept: FAMILY MEDICINE | Facility: CLINIC | Age: 48
End: 2023-10-05
Payer: COMMERCIAL

## 2023-10-05 VITALS
DIASTOLIC BLOOD PRESSURE: 78 MMHG | OXYGEN SATURATION: 95 % | HEIGHT: 68 IN | TEMPERATURE: 97.7 F | RESPIRATION RATE: 20 BRPM | HEART RATE: 77 BPM | WEIGHT: 196.6 LBS | SYSTOLIC BLOOD PRESSURE: 126 MMHG | BODY MASS INDEX: 29.8 KG/M2

## 2023-10-05 DIAGNOSIS — T63.451A TOXIC REACTION TO HORNETS, WASPS AND BEES, ACCIDENTAL OR UNINTENTIONAL, INITIAL ENCOUNTER: ICD-10-CM

## 2023-10-05 DIAGNOSIS — T63.461A TOXIC REACTION TO HORNETS, WASPS AND BEES, ACCIDENTAL OR UNINTENTIONAL, INITIAL ENCOUNTER: ICD-10-CM

## 2023-10-05 DIAGNOSIS — J45.30 MILD PERSISTENT ASTHMA WITHOUT COMPLICATION: ICD-10-CM

## 2023-10-05 DIAGNOSIS — J45.30 MILD PERSISTENT ASTHMA WITHOUT COMPLICATION: Primary | ICD-10-CM

## 2023-10-05 DIAGNOSIS — T63.441A TOXIC REACTION TO HORNETS, WASPS AND BEES, ACCIDENTAL OR UNINTENTIONAL, INITIAL ENCOUNTER: ICD-10-CM

## 2023-10-05 DIAGNOSIS — M70.22 OLECRANON BURSITIS OF LEFT ELBOW: ICD-10-CM

## 2023-10-05 PROCEDURE — 99214 OFFICE O/P EST MOD 30 MIN: CPT | Performed by: NURSE PRACTITIONER

## 2023-10-05 RX ORDER — INDOMETHACIN 50 MG/1
50 CAPSULE ORAL 2 TIMES DAILY WITH MEALS
Qty: 30 CAPSULE | Refills: 0 | Status: SHIPPED | OUTPATIENT
Start: 2023-10-05

## 2023-10-05 RX ORDER — ALBUTEROL SULFATE 90 UG/1
2 AEROSOL, METERED RESPIRATORY (INHALATION) EVERY 6 HOURS
Qty: 18 G | Refills: 11 | Status: SHIPPED | OUTPATIENT
Start: 2023-10-05

## 2023-10-05 RX ORDER — EPINEPHRINE 0.3 MG/.3ML
0.3 INJECTION SUBCUTANEOUS
Qty: 0.6 ML | Refills: 3 | Status: SHIPPED | OUTPATIENT
Start: 2023-10-05

## 2023-10-05 ASSESSMENT — PAIN SCALES - GENERAL: PAINLEVEL: MILD PAIN (2)

## 2023-10-05 ASSESSMENT — ASTHMA QUESTIONNAIRES: ACT_TOTALSCORE: 19

## 2023-10-05 NOTE — PROGRESS NOTES
"  Assessment & Plan     Olecranon bursitis of left elbow  No evidence of infection, seen by ortho, advised NSAIDs and compression. Thinks it is beginning to feel somewhat better, but not consistently using compression sleeve. Advised to use this as much as possible. Follow up with ortho if not improving as expected.  - indomethacin (INDOCIN) 50 MG capsule; Take 1 capsule (50 mg) by mouth 2 times daily (with meals)    Mild persistent asthma without complication  ACT 19 today, reports multiple days that he feels like his asthma is uncontrolled per month. Restart arnuity. Albuterol as needed.   - fluticasone (ARNUITY ELLIPTA) 200 MCG/ACT inhaler; Inhale 1 puff into the lungs daily  - albuterol (PROAIR HFA/PROVENTIL HFA/VENTOLIN HFA) 108 (90 Base) MCG/ACT inhaler; Inhale 2 puffs into the lungs every 6 hours This is your rescue inhaler.    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  - EPINEPHrine (ANY BX GENERIC EQUIV) 0.3 MG/0.3ML injection 2-pack; Inject 0.3 mLs (0.3 mg) into the muscle once as needed for anaphylaxis       BMI:   Estimated body mass index is 29.89 kg/m  as calculated from the following:    Height as of this encounter: 1.727 m (5' 8\").    Weight as of this encounter: 89.2 kg (196 lb 9.6 oz).       Patient Instructions   Medications refilled.  Use compression sleeve.  Rinse mouth after inhaler use.    ISSA English [facility]    Mendy Marie is a 48 year old, presenting for the following health issues:  Elbow Pain and Recheck Medication        10/5/2023     7:40 AM   Additional Questions   Roomed by Carlie HOLLAND MA   Accompanied by Self         10/5/2023     7:40 AM   Patient Reported Additional Medications   Patient reports taking the following new medications None       History of Present Illness       Reason for visit:  Elbow    He eats 2-3 servings of fruits and vegetables daily.He consumes 0 " "sweetened beverage(s) daily.He exercises with enough effort to increase his heart rate 10 to 19 minutes per day.  He exercises with enough effort to increase his heart rate 4 days per week.   He is taking medications regularly.     *Pt states that left elbow pain is improving and is painful intermittently. Right elbow is starting to hurt with no swelling or redness.         Medication Followup of Indomethacin 50mg capsule  Taking Medication as prescribed: yes, pt was taking as prescribed but has not been on it for 5 days due to running out of medication.   Side Effects:  None  Medication Helping Symptoms:  yes      Above HPI reviewed. Additionally, recently seen for left olecranon bursitis.  Has a history of septic bursitis of the same elbow.  Was seen by orthopedics, they advised compression, NSAIDs for now.  He has been taking indomethacin which is helpful when he has pain.  He was given a sleeve by orthopedics, however he has not been consistently using this.  He does note that his elbow feels somewhat better.    He notes that he needs a refill of Arnuity today.  This was initially prescribed by his asthma provider, however he has not seen him in over a year.  His ACT score today is 19, he notes at least several days a month he feels his asthma is poorly controlled.  He uses his albuterol inhaler a few times a week.  He would like to restart annuity as he has been off of it for several months.  He also needs a refill of EpiPen's for an allergy to bee stings.      Review of Systems   Constitutional, HEENT, cardiovascular, pulmonary, gi and gu systems are negative, except as otherwise noted.      Objective    /78 (BP Location: Right arm, Patient Position: Sitting, Cuff Size: Adult Regular)   Pulse 77   Temp 97.7  F (36.5  C) (Tympanic)   Resp 20   Ht 1.727 m (5' 8\")   Wt 89.2 kg (196 lb 9.6 oz)   SpO2 95%   BMI 29.89 kg/m    Body mass index is 29.89 kg/m .  Physical Exam  Vitals and nursing note " reviewed.   Constitutional:       Appearance: Normal appearance.   HENT:      Head: Normocephalic and atraumatic.      Right Ear: Tympanic membrane and ear canal normal.      Left Ear: Tympanic membrane and ear canal normal.      Nose: Nose normal. No rhinorrhea.      Right Sinus: No maxillary sinus tenderness or frontal sinus tenderness.      Left Sinus: No maxillary sinus tenderness or frontal sinus tenderness.      Mouth/Throat:      Lips: Pink.      Mouth: Mucous membranes are moist.      Pharynx: Oropharynx is clear.   Eyes:      General: Lids are normal.      Conjunctiva/sclera: Conjunctivae normal.      Comments: Non icteric   Cardiovascular:      Rate and Rhythm: Normal rate and regular rhythm.      Pulses: Normal pulses.      Heart sounds: Normal heart sounds, S1 normal and S2 normal.   Pulmonary:      Effort: Pulmonary effort is normal.      Breath sounds: Normal breath sounds and air entry.   Musculoskeletal:      Cervical back: Neck supple.   Lymphadenopathy:      Cervical: No cervical adenopathy.   Skin:     General: Skin is warm and dry.   Neurological:      General: No focal deficit present.      Mental Status: He is alert and oriented to person, place, and time.   Psychiatric:         Mood and Affect: Mood normal.         Behavior: Behavior normal.         Thought Content: Thought content normal.         Judgment: Judgment normal.

## 2023-10-05 NOTE — LETTER
My Asthma Action Plan    Name: Daniele Carter   YOB: 1975  Date: 10/5/2023   My doctor: ISSA English CNP   My clinic: [facility]        My Control Medicine: INHALED CORTICOSTEROIDS  My Rescue Medicine: Albuterol (Proair/Ventolin/Proventil HFA) 2-4 puffs EVERY 4 HOURS as needed. Use a spacer if recommended by your provider.   My Asthma Severity:   Mild Persistent  Know your asthma triggers:   little bit of everything; pets at home            GREEN ZONE   Good Control  I feel good  No cough or wheeze  Can work, sleep and play without asthma symptoms       Take your asthma control medicine every day.     If exercise triggers your asthma, take your rescue medication  15 minutes before exercise or sports, and  During exercise if you have asthma symptoms  Spacer to use with inhaler: If you have a spacer, make sure to use it with your inhaler             YELLOW ZONE Getting Worse  I have ANY of these:  I do not feel good  Cough or wheeze  Chest feels tight  Wake up at night   Keep taking your Green Zone medications  Start taking your rescue medicine:  every 20 minutes for up to 1 hour. Then every 4 hours for 24-48 hours.  If you stay in the Yellow Zone for more than 12-24 hours, contact your doctor.  If you do not return to the Green Zone in 12-24 hours or you get worse, start taking your oral steroid medicine if prescribed by your provider.           RED ZONE Medical Alert - Get Help  I have ANY of these:  I feel awful  Medicine is not helping  Breathing getting harder  Trouble walking or talking  Nose opens wide to breathe       Take your rescue medicine NOW  If your provider has prescribed an oral steroid medicine, start taking it NOW  Call your doctor NOW  If you are still in the Red Zone after 20 minutes and you have not reached your doctor:  Take your rescue medicine again and  Call 911 or go to the emergency room right away    See your regular doctor within 2  weeks of an Emergency Room or Urgent Care visit for follow-up treatment.          Annual Reminders:  Meet with Asthma Educator,  Flu Shot in the Fall, consider Pneumonia Vaccination for patients with asthma (aged 19 and older).    Pharmacy: [facility] [address] [place] [address]    Electronically signed by ISSA English CNP   Date: 10/05/23                      Asthma Triggers  How To Control Things That Make Your Asthma Worse    Triggers are things that make your asthma worse.  Look at the list below to help you find your triggers and what you can do about them.  You can help prevent asthma flare-ups by staying away from your triggers.      Trigger                                                          What you can do   Cigarette Smoke  Tobacco smoke can make asthma worse. Do not allow smoking in your home, car or around you.  Be sure no one smokes at a child s day care or school.  If you smoke, ask your health care provider for ways to help you quit.  Ask family members to quit too.  Ask your health care provider for a referral to Quit Plan to help you quit smoking, or call 0-695-415-PLAN.     Colds, Flu, Bronchitis  These are common triggers of asthma. Wash your hands often.  Don t touch your eyes, nose or mouth.  Get a flu shot every year.     Dust Mites  These are tiny bugs that live in cloth or carpet. They are too small to see. Wash sheets and blankets in hot water every week.   Encase pillows and mattress in dust mite proof covers.  Avoid having carpet if you can. If you have carpet, vacuum weekly.   Use a dust mask and HEPA vacuum.   Pollen and Outdoor Mold  Some people are allergic to trees, grass, or weed pollen, or molds. Try to keep your windows closed.  Limit time out doors when pollen count is high.   Ask you health care provider about taking medicine during allergy season.     Animal Dander  Some people are allergic to skin flakes, urine or saliva from pets with  fur or feathers. Keep pets with fur or feathers out of your home.    If you can t keep the pet outdoors, then keep the pet out of your bedroom.  Keep the bedroom door closed.  Keep pets off cloth furniture and away from stuffed toys.     Mice, Rats, and Cockroaches   Some people are allergic to the waste from these pests.   Cover food and garbage.  Clean up spills and food crumbs.  Store grease in the refrigerator.   Keep food out of the bedroom.   Indoor Mold  This can be a trigger if your home has high moisture. Fix leaking faucets, pipes, or other sources of water.   Clean moldy surfaces.  Dehumidify basement if it is damp and smelly.   Smoke, Strong Odors, and Sprays  These can reduce air quality. Stay away from strong odors and sprays, such as perfume, powder, hair spray, paints, smoke incense, paint, cleaning products, candles and new carpet.   Exercise or Sports  Some people with asthma have this trigger. Be active!  Ask your doctor about taking medicine before sports or exercise to prevent symptoms.    Warm up for 5-10 minutes before and after sports or exercise.     Other Triggers of Asthma  Cold air:  Cover your nose and mouth with a scarf.  Sometimes laughing or crying can be a trigger.  Some medicines and food can trigger asthma.

## 2023-10-06 NOTE — TELEPHONE ENCOUNTER
Prior Authorization Retail Medication Request    Medication/Dose: Arnuity ellipta  ICD code (if different than what is on RX):    Previously Tried and Failed:  albuterol hfa; proventil; flovent;   Rationale:  patient has used since 8/2021    Insurance Name:    Insurance ID:  [id number]       Pharmacy Information (if different than what is on RX)  Name:    Phone:

## 2023-10-10 NOTE — TELEPHONE ENCOUNTER
Central Prior Authorization Team   Phone: [phone]    PA Initiation    Medication: ARNUITY ELLIPTA 200 MCG/ACT IN AEPB  Insurance Company: [employer] - Phone [phone] Fax [phone]  Pharmacy Filling the Rx: [facility] [address] [place] [address]  Filling Pharmacy Phone: [phone]  Filling Pharmacy Fax:    Start Date: 10/10/2023

## 2023-10-11 NOTE — TELEPHONE ENCOUNTER
Dr. Thornton,  Per pharmacy Arnuity Ellipta is not covered but insurance will cover Pulmicort 180 mcg.    (PA is in process for Arnuity Ellipta)    Mirela Muir

## 2023-10-11 NOTE — TELEPHONE ENCOUNTER
PRIOR AUTHORIZATION DENIED    Medication: ARNUITY ELLIPTA 200 MCG/ACT IN AEPB  Insurance Company: [employer] - Phone [phone] Fax [phone]  Denial Date: 10/10/2023  Denial Rational:     Per chart note, Pulmicort script was sent to pharmacy as it is preferred by insurance.          Appeal Information:

## 2023-10-16 ENCOUNTER — TELEPHONE (OUTPATIENT)
Dept: FAMILY MEDICINE | Facility: CLINIC | Age: 48
End: 2023-10-16
Payer: COMMERCIAL

## 2023-10-17 NOTE — TELEPHONE ENCOUNTER
Prior Authorization Retail Medication Request    Medication/Dose: Albuterol sulfate hfa 108  ICD code (if different than what is on RX):    Previously Tried and Failed:  proair hfa/proventil hfa/ventolin hfa; arnuity ellipta; flovent hfa  Rationale:      Insurance Name:  not provided  Insurance ID:  not provided  CMM Key: [id number]      Pharmacy Information (if different than what is on RX)  Name:    Phone:

## 2023-10-19 NOTE — TELEPHONE ENCOUNTER
Central Prior Authorization Team   Phone: [phone]    PA Initiation    Medication: ALBUTEROL SULFATE  (90 BASE) MCG/ACT IN AERS  Insurance Company: [employer] - Phone [phone] Fax [phone]  Pharmacy Filling the Rx: [facility] [id number]   Filling Pharmacy Phone: [phone]  Filling Pharmacy Fax:    Start Date: 10/19/2023    Called pharmacy, they were able to switch to a different NDC and received a paid claim.  **Instructed pharmacy to notify patient when script is ready to /ship.**

## 2023-12-28 DIAGNOSIS — K21.9 GASTROESOPHAGEAL REFLUX DISEASE, UNSPECIFIED WHETHER ESOPHAGITIS PRESENT: ICD-10-CM

## 2023-12-28 RX ORDER — OMEPRAZOLE 40 MG/1
CAPSULE, DELAYED RELEASE ORAL
Qty: 90 CAPSULE | Refills: 0 | Status: SHIPPED | OUTPATIENT
Start: 2023-12-28

## 2024-01-17 ENCOUNTER — TELEPHONE (OUTPATIENT)
Dept: FAMILY MEDICINE | Facility: CLINIC | Age: 49
End: 2024-01-17
Payer: COMMERCIAL

## 2024-01-17 NOTE — LETTER
January 24, 2024      Daniele Carter  [address] [place] [id number]        Dear Daniele,     Your healthcare team cares about your health. To provide you with the best care, we have reviewed your chart and based on our findings, we see that you are due for:   An Asthma Control Test (ACT) that our clinic uses to monitor and manage your asthma. This test is an assessment tool that we use to determine how well your asthma is controlled.  Please complete the enclosed form and return in the provided envelope.  An adult preventive exam.  Schedule by calling [phone] or through [employer].   Thank you for choosing [facility] for your healthcare needs. For any questions, concerns, or to schedule an appointment please contact the clinic.   Healthy Regards,    Your [facility] Care Team

## 2024-01-17 NOTE — TELEPHONE ENCOUNTER
Patient Quality Outreach    Patient is due for the following:   Asthma  -  ACT needed  Physical Preventive Adult Physical    Next Steps:   Schedule a Adult Preventative  Patient was assigned appropriate questionnaire to complete    Type of outreach:    Sent [employer] message.  Will postpone x 1 week, if not viewed or completed please mail ACT.         Questions for provider review:    None           Sybil Sharif, CMA

## 2024-01-24 NOTE — TELEPHONE ENCOUNTER
Patient Quality Outreach    Patient is due for the following:   Asthma  -  ACT needed  Physical Preventive Adult Physical    Next Steps:   Schedule a Adult Preventative  Patient was assigned appropriate questionnaire to complete    Type of outreach:    [employer] message has not been viewed, letter mailed with ACT to complete      Questions for provider review:    None           Sybil Sharif, [facility]

## 2024-06-15 ENCOUNTER — HEALTH MAINTENANCE LETTER (OUTPATIENT)
Age: 49
End: 2024-06-15

## 2024-07-08 ENCOUNTER — TELEPHONE (OUTPATIENT)
Dept: FAMILY MEDICINE | Facility: CLINIC | Age: 49
End: 2024-07-08
Payer: COMMERCIAL

## 2024-07-08 NOTE — TELEPHONE ENCOUNTER
Patient Quality Outreach    Patient is due for the following:   Asthma  -  ACT needed  Physical Preventive Adult Physical    Next Steps:   Schedule a Adult Preventative  Patient was assigned appropriate questionnaire to complete    Type of outreach:    Sent [employer] message.  Will postpone x 1 week, if not viewed or completed please mail ACT.         Questions for provider review:    None           Sybil Sharif, CMA

## 2024-12-26 NOTE — LETTER
[facility]  [address] [facility] [address]  Phone: [phone]    July 25, 2017        Daniele Carter  [address] [facility] [address]          To whom it may concern:    RE: Daniele Carter    Patient was seen and treated today at our clinic and missed work.  He should remain off of work if not able to avoid excessive walking and stair climbing.  He can return to work without restrictions on 7/30/17.      Please contact me for questions or concerns.      Sincerely,        Elmer Horn MD   [Fatigue] : fatigue [Hot Flashes] : hot flashes [Negative] : Allergic/Immunologic [Recent Change In Weight] : ~T no recent weight change [Chest Pain] : no chest pain [Palpitations] : no palpitations [FreeTextEntry8] : reports slow urination [FreeTextEntry9] : chronic shoulder pain

## 2025-07-06 ENCOUNTER — HEALTH MAINTENANCE LETTER (OUTPATIENT)
Age: 50
End: 2025-07-06

## (undated) DEVICE — SU MONOCRYL 3-0 PS-2 18" UND Y497G

## (undated) DEVICE — PREP SKIN SCRUB TRAY 4461A

## (undated) DEVICE — GOWN LG DISP 9515

## (undated) DEVICE — GLOVE PROTEXIS W/NEU-THERA 6.5  2D73TE65

## (undated) DEVICE — CAST PADDING 4" STERILE 9044S

## (undated) DEVICE — BNDG ELASTIC 4"X5YDS UNSTERILE 6611-40

## (undated) DEVICE — SOL NACL 0.9% IRRIG 1000ML BOTTLE 07138-09

## (undated) DEVICE — NDL COUNTER 20CT 31142493

## (undated) DEVICE — SUCTION TIP YANKAUER STR K87

## (undated) DEVICE — PACK HAND

## (undated) DEVICE — SU VICRYL 0 CT-1 36" J946H

## (undated) DEVICE — TUBING SUCTION 12"X1/4" N612

## (undated) DEVICE — SU STRATAFIX MONOCRYL 3-0 SPIRAL PS-2 30CM SXMP1B106

## (undated) DEVICE — SYR 10ML FINGER CONTROL W/O NDL 309695

## (undated) DEVICE — TOURNIQUET SGL BLADDER 18"X4" RED 5921-218-135

## (undated) DEVICE — ESU PENCIL W/COATED BLADE E2450H

## (undated) DEVICE — SU VICRYL 2-0 CT-1 36" UND J945H

## (undated) RX ORDER — LIDOCAINE HYDROCHLORIDE 10 MG/ML
INJECTION, SOLUTION EPIDURAL; INFILTRATION; INTRACAUDAL; PERINEURAL
Status: DISPENSED
Start: 2021-09-19

## (undated) RX ORDER — PROPOFOL 10 MG/ML
INJECTION, EMULSION INTRAVENOUS
Status: DISPENSED
Start: 2021-09-19

## (undated) RX ORDER — LIDOCAINE HYDROCHLORIDE 20 MG/ML
JELLY TOPICAL
Status: DISPENSED
Start: 2021-09-19

## (undated) RX ORDER — GLYCOPYRROLATE 0.2 MG/ML
INJECTION, SOLUTION INTRAMUSCULAR; INTRAVENOUS
Status: DISPENSED
Start: 2017-03-28

## (undated) RX ORDER — KETOROLAC TROMETHAMINE 30 MG/ML
INJECTION, SOLUTION INTRAMUSCULAR; INTRAVENOUS
Status: DISPENSED
Start: 2021-09-19

## (undated) RX ORDER — FENTANYL CITRATE 50 UG/ML
INJECTION, SOLUTION INTRAMUSCULAR; INTRAVENOUS
Status: DISPENSED
Start: 2021-09-19

## (undated) RX ORDER — ONDANSETRON 2 MG/ML
INJECTION INTRAMUSCULAR; INTRAVENOUS
Status: DISPENSED
Start: 2021-09-19

## (undated) RX ORDER — GLYCOPYRROLATE 0.2 MG/ML
INJECTION, SOLUTION INTRAMUSCULAR; INTRAVENOUS
Status: DISPENSED
Start: 2021-09-19

## (undated) RX ORDER — DEXAMETHASONE SODIUM PHOSPHATE 4 MG/ML
INJECTION, SOLUTION INTRA-ARTICULAR; INTRALESIONAL; INTRAMUSCULAR; INTRAVENOUS; SOFT TISSUE
Status: DISPENSED
Start: 2021-09-19

## (undated) RX ORDER — LIDOCAINE HYDROCHLORIDE 10 MG/ML
INJECTION, SOLUTION EPIDURAL; INFILTRATION; INTRACAUDAL; PERINEURAL
Status: DISPENSED
Start: 2017-03-28

## (undated) RX ORDER — CEFAZOLIN SODIUM 1 G/3ML
INJECTION, POWDER, FOR SOLUTION INTRAMUSCULAR; INTRAVENOUS
Status: DISPENSED
Start: 2021-09-19